# Patient Record
Sex: FEMALE | Race: WHITE | NOT HISPANIC OR LATINO | Employment: OTHER | URBAN - METROPOLITAN AREA
[De-identification: names, ages, dates, MRNs, and addresses within clinical notes are randomized per-mention and may not be internally consistent; named-entity substitution may affect disease eponyms.]

---

## 2017-01-06 ENCOUNTER — OFFICE VISIT (OUTPATIENT)
Dept: FAMILY MEDICINE | Facility: CLINIC | Age: 62
End: 2017-01-06
Payer: COMMERCIAL

## 2017-01-06 VITALS
SYSTOLIC BLOOD PRESSURE: 130 MMHG | WEIGHT: 153.56 LBS | HEIGHT: 64 IN | OXYGEN SATURATION: 95 % | DIASTOLIC BLOOD PRESSURE: 86 MMHG | BODY MASS INDEX: 26.21 KG/M2 | TEMPERATURE: 98 F | HEART RATE: 92 BPM

## 2017-01-06 DIAGNOSIS — J06.9 URI WITH COUGH AND CONGESTION: Primary | ICD-10-CM

## 2017-01-06 PROCEDURE — 99999 PR PBB SHADOW E&M-EST. PATIENT-LVL III: CPT | Mod: PBBFAC,,, | Performed by: NURSE PRACTITIONER

## 2017-01-06 PROCEDURE — 99213 OFFICE O/P EST LOW 20 MIN: CPT | Mod: S$GLB,,, | Performed by: NURSE PRACTITIONER

## 2017-01-06 PROCEDURE — 3075F SYST BP GE 130 - 139MM HG: CPT | Mod: S$GLB,,, | Performed by: NURSE PRACTITIONER

## 2017-01-06 PROCEDURE — 1159F MED LIST DOCD IN RCRD: CPT | Mod: S$GLB,,, | Performed by: NURSE PRACTITIONER

## 2017-01-06 PROCEDURE — 3079F DIAST BP 80-89 MM HG: CPT | Mod: S$GLB,,, | Performed by: NURSE PRACTITIONER

## 2017-01-06 RX ORDER — BENZONATATE 100 MG/1
100 CAPSULE ORAL 3 TIMES DAILY PRN
Qty: 30 CAPSULE | Refills: 0 | Status: SHIPPED | OUTPATIENT
Start: 2017-01-06 | End: 2017-01-16

## 2017-01-06 RX ORDER — FLUTICASONE PROPIONATE 50 MCG
2 SPRAY, SUSPENSION (ML) NASAL DAILY
Qty: 16 G | Refills: 11 | Status: SHIPPED | OUTPATIENT
Start: 2017-01-06 | End: 2017-12-29

## 2017-01-06 NOTE — PROGRESS NOTES
Subjective:       Patient ID: Tegan Alarcon is a 61 y.o. female.    Chief Complaint: Cough (x 2-3 days) and Nasal Congestion    HPI     Pt presents to clinic with complaints of a cough, nasal congestion for the past 2-3 days.   Cough is not productive. Denies fever, chills.   She has been taking otc Theraflu with some improvement in sx.   Denies hx of asthma, COPD. She is not a smoker.     Review of Systems   Constitutional: Negative for chills and fever.   HENT: Positive for congestion, postnasal drip, rhinorrhea, sinus pressure, sneezing and sore throat (improved). Negative for ear pain.    Respiratory: Positive for cough, shortness of breath (with persistent coughing. ) and wheezing.    Gastrointestinal: Negative for diarrhea, nausea and vomiting.   Musculoskeletal: Negative for neck pain and neck stiffness.   Skin: Negative for rash and wound.   Neurological: Negative for dizziness, light-headedness and headaches.       Objective:      Physical Exam   Constitutional: She is oriented to person, place, and time. She appears well-developed and well-nourished.   HENT:   Head: Normocephalic and atraumatic.   Right Ear: Tympanic membrane is not erythematous. A middle ear effusion is present.   Left Ear: Tympanic membrane is not erythematous. A middle ear effusion is present.   Nose: Mucosal edema and rhinorrhea present. Right sinus exhibits no maxillary sinus tenderness and no frontal sinus tenderness. Left sinus exhibits no maxillary sinus tenderness and no frontal sinus tenderness.   Mouth/Throat: Uvula is midline and mucous membranes are normal. No oropharyngeal exudate or posterior oropharyngeal erythema.   Eyes: Pupils are equal, round, and reactive to light. Right eye exhibits no discharge. Left eye exhibits no discharge.   Neck: Normal range of motion. Neck supple.   Cardiovascular: Normal rate, regular rhythm and normal heart sounds.    Pulmonary/Chest: Effort normal and breath sounds normal. No respiratory  distress. She has no wheezes. She has no rales.   Musculoskeletal: Normal range of motion. She exhibits no edema.   Neurological: She is alert and oriented to person, place, and time. No cranial nerve deficit. Coordination normal.   Skin: Skin is warm and dry. No rash noted. No erythema.   Psychiatric: She has a normal mood and affect. Her behavior is normal.   Nursing note and vitals reviewed.      Assessment:       1. URI with cough and congestion        Plan:   Tegan was seen today for cough and nasal congestion.    Diagnoses and all orders for this visit:    URI with cough and congestion  -     fluticasone (FLONASE) 50 mcg/actuation nasal spray; 2 sprays by Each Nare route once daily.  -     benzonatate (TESSALON) 100 MG capsule; Take 1 capsule (100 mg total) by mouth 3 (three) times daily as needed for Cough.  Expectant management reviewed: increase fluid intake, otc analgesics prn, mucinex and antihistamines for sx relief. Call if sx persist or worsen.

## 2017-02-20 RX ORDER — LISINOPRIL 10 MG/1
TABLET ORAL
Qty: 90 TABLET | Refills: 0 | Status: SHIPPED | OUTPATIENT
Start: 2017-02-20 | End: 2017-05-24 | Stop reason: SDUPTHER

## 2017-05-22 DIAGNOSIS — Z80.3 FAMILY HISTORY OF MALIGNANT NEOPLASM OF BREAST: Primary | ICD-10-CM

## 2017-05-22 DIAGNOSIS — R92.8 ABNORMAL MAMMOGRAM, UNSPECIFIED: ICD-10-CM

## 2017-05-24 RX ORDER — LISINOPRIL 10 MG/1
TABLET ORAL
Qty: 90 TABLET | Refills: 0 | Status: SHIPPED | OUTPATIENT
Start: 2017-05-24 | End: 2017-08-19 | Stop reason: SDUPTHER

## 2017-08-21 RX ORDER — LISINOPRIL 10 MG/1
TABLET ORAL
Qty: 90 TABLET | Refills: 0 | Status: SHIPPED | OUTPATIENT
Start: 2017-08-21 | End: 2017-11-18 | Stop reason: SDUPTHER

## 2017-11-20 RX ORDER — LISINOPRIL 10 MG/1
TABLET ORAL
Qty: 30 TABLET | Refills: 0 | Status: SHIPPED | OUTPATIENT
Start: 2017-11-20 | End: 2017-12-17 | Stop reason: SDUPTHER

## 2017-11-20 NOTE — TELEPHONE ENCOUNTER
----- Message from Esther Syed sent at 11/20/2017  2:54 PM CST -----  Returning your call.  Please call patient at [phone].

## 2017-12-06 ENCOUNTER — LAB VISIT (OUTPATIENT)
Dept: LAB | Facility: HOSPITAL | Age: 62
End: 2017-12-06
Attending: FAMILY MEDICINE
Payer: COMMERCIAL

## 2017-12-06 DIAGNOSIS — R74.01 TRANSAMINITIS: ICD-10-CM

## 2017-12-06 DIAGNOSIS — E78.5 HYPERLIPIDEMIA, UNSPECIFIED HYPERLIPIDEMIA TYPE: ICD-10-CM

## 2017-12-06 DIAGNOSIS — E87.0 SERUM SODIUM ELEVATED: ICD-10-CM

## 2017-12-06 LAB
ALBUMIN SERPL BCP-MCNC: 3.9 G/DL
ALP SERPL-CCNC: 102 U/L
ALT SERPL W/O P-5'-P-CCNC: 87 U/L
ANION GAP SERPL CALC-SCNC: 9 MMOL/L
AST SERPL-CCNC: 39 U/L
BILIRUB SERPL-MCNC: 0.4 MG/DL
BUN SERPL-MCNC: 20 MG/DL
CALCIUM SERPL-MCNC: 9.6 MG/DL
CHLORIDE SERPL-SCNC: 109 MMOL/L
CHOLEST SERPL-MCNC: 246 MG/DL
CHOLEST/HDLC SERPL: 3.4 {RATIO}
CO2 SERPL-SCNC: 25 MMOL/L
CREAT SERPL-MCNC: 0.8 MG/DL
EST. GFR  (AFRICAN AMERICAN): >60 ML/MIN/1.73 M^2
EST. GFR  (NON AFRICAN AMERICAN): >60 ML/MIN/1.73 M^2
GLUCOSE SERPL-MCNC: 92 MG/DL
HDLC SERPL-MCNC: 73 MG/DL
HDLC SERPL: 29.7 %
LDLC SERPL CALC-MCNC: 140.8 MG/DL
NONHDLC SERPL-MCNC: 173 MG/DL
POTASSIUM SERPL-SCNC: 4.2 MMOL/L
PROT SERPL-MCNC: 7.6 G/DL
SODIUM SERPL-SCNC: 143 MMOL/L
TRIGL SERPL-MCNC: 161 MG/DL

## 2017-12-06 PROCEDURE — 80061 LIPID PANEL: CPT

## 2017-12-06 PROCEDURE — 80053 COMPREHEN METABOLIC PANEL: CPT

## 2017-12-06 PROCEDURE — 36415 COLL VENOUS BLD VENIPUNCTURE: CPT | Mod: PN

## 2017-12-14 ENCOUNTER — PATIENT OUTREACH (OUTPATIENT)
Dept: ADMINISTRATIVE | Facility: HOSPITAL | Age: 62
End: 2017-12-14

## 2017-12-14 NOTE — LETTER
December 20, 2017    Tegan COLE Dorian  [address] [place] [address]             [facility]  [address]  [facility] [address]  Phone: [phone] Dear Mrs. Alarcon:    We have tried to reach you by mychart unsuccessfully.    [facility] is committed to your overall health.  To help you get the most out of each of your visits, we will review your information to make sure you are up to date on all of your recommended tests and/or procedures.       Dr. Kari Miguel has found that your chart shows you may be due for tetanus and shingles immunizations.     If you have had any of the above done at another facility, please bring the records or information with you so that your record at [facility] will be complete.  If you would like to schedule any of these, please contact me.     If you are currently taking medication, please bring it with you to your appointment for review.      If you have any questions or concerns, please don't hesitate to call.    Thank you for letting us care for you,  Francisca Lopez LPN Clinical Care Coordinator  [facility] [place]  [phone]

## 2017-12-18 RX ORDER — LISINOPRIL 10 MG/1
TABLET ORAL
Qty: 30 TABLET | Refills: 0 | Status: SHIPPED | OUTPATIENT
Start: 2017-12-18 | End: 2018-01-13 | Stop reason: SDUPTHER

## 2017-12-29 ENCOUNTER — OFFICE VISIT (OUTPATIENT)
Dept: FAMILY MEDICINE | Facility: CLINIC | Age: 62
End: 2017-12-29
Payer: COMMERCIAL

## 2017-12-29 VITALS
OXYGEN SATURATION: 99 % | TEMPERATURE: 98 F | WEIGHT: 154.88 LBS | HEART RATE: 70 BPM | DIASTOLIC BLOOD PRESSURE: 78 MMHG | HEIGHT: 64 IN | SYSTOLIC BLOOD PRESSURE: 118 MMHG | BODY MASS INDEX: 26.44 KG/M2 | RESPIRATION RATE: 16 BRPM

## 2017-12-29 DIAGNOSIS — M85.80 OSTEOPENIA, UNSPECIFIED LOCATION: ICD-10-CM

## 2017-12-29 DIAGNOSIS — Z78.0 POSTMENOPAUSAL STATE: ICD-10-CM

## 2017-12-29 DIAGNOSIS — I10 ESSENTIAL HYPERTENSION: ICD-10-CM

## 2017-12-29 DIAGNOSIS — Z00.00 ROUTINE GENERAL MEDICAL EXAMINATION AT A HEALTH CARE FACILITY: Primary | ICD-10-CM

## 2017-12-29 DIAGNOSIS — Z23 IMMUNIZATION DUE: ICD-10-CM

## 2017-12-29 DIAGNOSIS — E78.5 HYPERLIPIDEMIA, UNSPECIFIED HYPERLIPIDEMIA TYPE: ICD-10-CM

## 2017-12-29 PROCEDURE — 99999 PR PBB SHADOW E&M-EST. PATIENT-LVL IV: CPT | Mod: PBBFAC,,, | Performed by: FAMILY MEDICINE

## 2017-12-29 PROCEDURE — 99396 PREV VISIT EST AGE 40-64: CPT | Mod: 25,S$GLB,, | Performed by: FAMILY MEDICINE

## 2017-12-29 PROCEDURE — 90715 TDAP VACCINE 7 YRS/> IM: CPT | Mod: S$GLB,,, | Performed by: FAMILY MEDICINE

## 2017-12-29 PROCEDURE — 90471 IMMUNIZATION ADMIN: CPT | Mod: S$GLB,,, | Performed by: FAMILY MEDICINE

## 2017-12-29 NOTE — PROGRESS NOTES
Subjective:       Patient ID: Tegan Alarcon is a 62 y.o. female.    Chief Complaint: Annual Exam    HPI   Patient here today for her annual exam.  No updates to medical hx.  The 10-year ASCVD risk score (Izzy LINDA Jr., et al., 2013) is: 3.7%.  Labs 12/2017 rev.    Review of Systems   Constitutional: Negative for activity change, fatigue, fever and unexpected weight change.   HENT: Negative for congestion, postnasal drip, rhinorrhea, sinus pressure, sneezing and sore throat.    Eyes: Positive for itching. Negative for discharge and redness.   Respiratory: Negative for apnea (+baby snoring, no reported apnea), cough and shortness of breath.    Cardiovascular: Negative for chest pain, palpitations and leg swelling.   Gastrointestinal: Negative for abdominal pain, blood in stool, constipation, diarrhea and nausea.        No gerd c/o.   Genitourinary: Negative for difficulty urinating, dysuria, hematuria, vaginal bleeding and vaginal discharge.   Musculoskeletal: Negative for arthralgias, back pain and joint swelling.   Skin: Negative for color change and rash.   Neurological: Negative for dizziness, light-headedness and headaches.   Psychiatric/Behavioral: Negative for dysphoric mood and sleep disturbance. The patient is not nervous/anxious.        Objective:      Physical Exam   Constitutional: She is oriented to person, place, and time. She appears well-developed and well-nourished. No distress.   HENT:   Head: Normocephalic and atraumatic.   Right Ear: External ear normal.   Left Ear: External ear normal.   Nose: Nose normal.   Mouth/Throat: Oropharynx is clear and moist. No oropharyngeal exudate.   Eyes: Conjunctivae and EOM are normal. Pupils are equal, round, and reactive to light.   Neck: Normal range of motion. Neck supple. No thyromegaly present.   Cardiovascular: Normal rate and regular rhythm.    Pulmonary/Chest: Effort normal and breath sounds normal. No respiratory distress. She has no wheezes.   Abdominal:  Soft. Bowel sounds are normal. She exhibits no distension and no mass. There is no tenderness. There is no rebound and no guarding.   Musculoskeletal: Normal range of motion. She exhibits no edema.   Lymphadenopathy:     She has no cervical adenopathy.   Neurological: She is alert and oriented to person, place, and time. She has normal reflexes. No cranial nerve deficit.   Skin: Skin is warm and dry.   Psychiatric: She has a normal mood and affect. Her behavior is normal.   Nursing note and vitals reviewed.        Routine general medical examination at a health care facility  Anticipatory guidance reviewed.  Essential hypertension  Controlled, cont regimen.  Hyperlipidemia, unspecified hyperlipidemia type  Doing well, ascvd <7%.  Osteopenia, unspecified location  Cont ca+d, weight-bearing exercise.  Immunization due  tdap today.  Postmenopausal state  -     DXA Bone Density Spine And Hip; Future; Expected date: 12/29/2017  Other orders  -     (In Office Administered) Tdap Vaccine

## 2018-01-03 ENCOUNTER — HOSPITAL ENCOUNTER (OUTPATIENT)
Dept: RADIOLOGY | Facility: HOSPITAL | Age: 63
Discharge: HOME | End: 2018-01-03
Attending: FAMILY MEDICINE
Payer: COMMERCIAL

## 2018-01-03 DIAGNOSIS — Z78.0 POSTMENOPAUSAL STATE: ICD-10-CM

## 2018-01-03 PROCEDURE — 77080 DXA BONE DENSITY AXIAL: CPT | Mod: 26,,, | Performed by: RADIOLOGY

## 2018-01-03 PROCEDURE — 77080 DXA BONE DENSITY AXIAL: CPT | Mod: TC,PO

## 2018-01-15 RX ORDER — LISINOPRIL 10 MG/1
TABLET ORAL
Qty: 90 TABLET | Refills: 1 | Status: SHIPPED | OUTPATIENT
Start: 2018-01-15 | End: 2018-07-19 | Stop reason: SDUPTHER

## 2018-07-19 RX ORDER — LISINOPRIL 10 MG/1
TABLET ORAL
Qty: 90 TABLET | Refills: 0 | Status: SHIPPED | OUTPATIENT
Start: 2018-07-19 | End: 2018-10-15 | Stop reason: SDUPTHER

## 2018-10-15 RX ORDER — LISINOPRIL 10 MG/1
TABLET ORAL
Qty: 90 TABLET | Refills: 1 | Status: SHIPPED | OUTPATIENT
Start: 2018-10-15 | End: 2019-04-20 | Stop reason: SDUPTHER

## 2019-01-18 ENCOUNTER — TELEPHONE (OUTPATIENT)
Dept: FAMILY MEDICINE | Facility: CLINIC | Age: 64
End: 2019-01-18

## 2019-01-18 DIAGNOSIS — Z00.00 ROUTINE GENERAL MEDICAL EXAMINATION AT A HEALTH CARE FACILITY: Primary | ICD-10-CM

## 2019-01-18 DIAGNOSIS — I10 ESSENTIAL HYPERTENSION: ICD-10-CM

## 2019-01-18 DIAGNOSIS — E78.5 HYPERLIPIDEMIA, UNSPECIFIED HYPERLIPIDEMIA TYPE: ICD-10-CM

## 2019-01-18 NOTE — TELEPHONE ENCOUNTER
Last labs on 12-6-17.    Placed orders per wog. Please review and advise if more labs are needed.

## 2019-01-18 NOTE — TELEPHONE ENCOUNTER
----- Message from Karina Garcia sent at 1/18/2019 11:46 AM CST -----  Contact: self  Patient need lab order put in for appointment on 02/20 per patient       Please call to advice [phone]

## 2019-02-13 ENCOUNTER — LAB VISIT (OUTPATIENT)
Dept: LAB | Facility: HOSPITAL | Age: 64
End: 2019-02-13
Attending: FAMILY MEDICINE
Payer: COMMERCIAL

## 2019-02-13 DIAGNOSIS — Z00.00 ROUTINE GENERAL MEDICAL EXAMINATION AT A HEALTH CARE FACILITY: ICD-10-CM

## 2019-02-13 DIAGNOSIS — I10 ESSENTIAL HYPERTENSION: ICD-10-CM

## 2019-02-13 DIAGNOSIS — E78.5 HYPERLIPIDEMIA, UNSPECIFIED HYPERLIPIDEMIA TYPE: ICD-10-CM

## 2019-02-13 LAB
ALBUMIN SERPL BCP-MCNC: 3.9 G/DL
ALP SERPL-CCNC: 78 U/L
ALT SERPL W/O P-5'-P-CCNC: 39 U/L
ANION GAP SERPL CALC-SCNC: 10 MMOL/L
AST SERPL-CCNC: 24 U/L
BASOPHILS # BLD AUTO: 0.04 K/UL
BASOPHILS NFR BLD: 0.7 %
BILIRUB SERPL-MCNC: 0.4 MG/DL
BUN SERPL-MCNC: 20 MG/DL
CALCIUM SERPL-MCNC: 9.7 MG/DL
CHLORIDE SERPL-SCNC: 107 MMOL/L
CHOLEST SERPL-MCNC: 218 MG/DL
CHOLEST/HDLC SERPL: 3.7 {RATIO}
CO2 SERPL-SCNC: 26 MMOL/L
CREAT SERPL-MCNC: 0.8 MG/DL
DIFFERENTIAL METHOD: NORMAL
EOSINOPHIL # BLD AUTO: 0.1 K/UL
EOSINOPHIL NFR BLD: 1.3 %
ERYTHROCYTE [DISTWIDTH] IN BLOOD BY AUTOMATED COUNT: 12.7 %
EST. GFR  (AFRICAN AMERICAN): >60 ML/MIN/1.73 M^2
EST. GFR  (NON AFRICAN AMERICAN): >60 ML/MIN/1.73 M^2
GLUCOSE SERPL-MCNC: 81 MG/DL
HCT VFR BLD AUTO: 42.3 %
HDLC SERPL-MCNC: 59 MG/DL
HDLC SERPL: 27.1 %
HGB BLD-MCNC: 14.1 G/DL
IMM GRANULOCYTES # BLD AUTO: 0.01 K/UL
IMM GRANULOCYTES NFR BLD AUTO: 0.2 %
LDLC SERPL CALC-MCNC: 125.8 MG/DL
LYMPHOCYTES # BLD AUTO: 2.3 K/UL
LYMPHOCYTES NFR BLD: 41.6 %
MCH RBC QN AUTO: 30.8 PG
MCHC RBC AUTO-ENTMCNC: 33.3 G/DL
MCV RBC AUTO: 92 FL
MONOCYTES # BLD AUTO: 0.7 K/UL
MONOCYTES NFR BLD: 11.9 %
NEUTROPHILS # BLD AUTO: 2.4 K/UL
NEUTROPHILS NFR BLD: 44.3 %
NONHDLC SERPL-MCNC: 159 MG/DL
NRBC BLD-RTO: 0 /100 WBC
PLATELET # BLD AUTO: 256 K/UL
PMV BLD AUTO: 11 FL
POTASSIUM SERPL-SCNC: 4.2 MMOL/L
PROT SERPL-MCNC: 7.1 G/DL
RBC # BLD AUTO: 4.58 M/UL
SODIUM SERPL-SCNC: 143 MMOL/L
TRIGL SERPL-MCNC: 166 MG/DL
TSH SERPL DL<=0.005 MIU/L-ACNC: 1.75 UIU/ML
WBC # BLD AUTO: 5.48 K/UL

## 2019-02-13 PROCEDURE — 36415 COLL VENOUS BLD VENIPUNCTURE: CPT | Mod: PN

## 2019-02-13 PROCEDURE — 84443 ASSAY THYROID STIM HORMONE: CPT

## 2019-02-13 PROCEDURE — 85025 COMPLETE CBC W/AUTO DIFF WBC: CPT

## 2019-02-13 PROCEDURE — 80053 COMPREHEN METABOLIC PANEL: CPT

## 2019-02-13 PROCEDURE — 80061 LIPID PANEL: CPT

## 2019-02-20 ENCOUNTER — OFFICE VISIT (OUTPATIENT)
Dept: FAMILY MEDICINE | Facility: CLINIC | Age: 64
End: 2019-02-20
Payer: COMMERCIAL

## 2019-02-20 VITALS
TEMPERATURE: 98 F | HEIGHT: 64 IN | DIASTOLIC BLOOD PRESSURE: 72 MMHG | OXYGEN SATURATION: 96 % | RESPIRATION RATE: 18 BRPM | HEART RATE: 72 BPM | SYSTOLIC BLOOD PRESSURE: 118 MMHG | WEIGHT: 153.25 LBS | BODY MASS INDEX: 26.16 KG/M2

## 2019-02-20 DIAGNOSIS — E78.5 HYPERLIPIDEMIA, UNSPECIFIED HYPERLIPIDEMIA TYPE: ICD-10-CM

## 2019-02-20 DIAGNOSIS — Z00.00 ROUTINE GENERAL MEDICAL EXAMINATION AT A HEALTH CARE FACILITY: Primary | ICD-10-CM

## 2019-02-20 PROCEDURE — 3074F SYST BP LT 130 MM HG: CPT | Mod: CPTII,S$GLB,, | Performed by: FAMILY MEDICINE

## 2019-02-20 PROCEDURE — 90471 IMMUNIZATION ADMIN: CPT | Mod: S$GLB,,, | Performed by: FAMILY MEDICINE

## 2019-02-20 PROCEDURE — 99396 PREV VISIT EST AGE 40-64: CPT | Mod: 25,S$GLB,, | Performed by: FAMILY MEDICINE

## 2019-02-20 PROCEDURE — 90471 FLU VACCINE (QUAD) GREATER THAN OR EQUAL TO 3YO PRESERVATIVE FREE IM: ICD-10-PCS | Mod: S$GLB,,, | Performed by: FAMILY MEDICINE

## 2019-02-20 PROCEDURE — 3078F DIAST BP <80 MM HG: CPT | Mod: CPTII,S$GLB,, | Performed by: FAMILY MEDICINE

## 2019-02-20 PROCEDURE — 90686 IIV4 VACC NO PRSV 0.5 ML IM: CPT | Mod: S$GLB,,, | Performed by: FAMILY MEDICINE

## 2019-02-20 PROCEDURE — 3078F PR MOST RECENT DIASTOLIC BLOOD PRESSURE < 80 MM HG: ICD-10-PCS | Mod: CPTII,S$GLB,, | Performed by: FAMILY MEDICINE

## 2019-02-20 PROCEDURE — 99396 PR PREVENTIVE VISIT,EST,40-64: ICD-10-PCS | Mod: 25,S$GLB,, | Performed by: FAMILY MEDICINE

## 2019-02-20 PROCEDURE — 3074F PR MOST RECENT SYSTOLIC BLOOD PRESSURE < 130 MM HG: ICD-10-PCS | Mod: CPTII,S$GLB,, | Performed by: FAMILY MEDICINE

## 2019-02-20 PROCEDURE — 99999 PR PBB SHADOW E&M-EST. PATIENT-LVL III: CPT | Mod: PBBFAC,,, | Performed by: FAMILY MEDICINE

## 2019-02-20 PROCEDURE — 99999 PR PBB SHADOW E&M-EST. PATIENT-LVL III: ICD-10-PCS | Mod: PBBFAC,,, | Performed by: FAMILY MEDICINE

## 2019-02-20 PROCEDURE — 90686 FLU VACCINE (QUAD) GREATER THAN OR EQUAL TO 3YO PRESERVATIVE FREE IM: ICD-10-PCS | Mod: S$GLB,,, | Performed by: FAMILY MEDICINE

## 2019-02-20 RX ORDER — ESTRADIOL 0.1 MG/G
1 CREAM VAGINAL
Refills: 6 | COMMUNITY
Start: 2019-01-24 | End: 2021-03-18

## 2019-02-20 NOTE — PROGRESS NOTES
Subjective:       Patient ID: Tegan Alarcon is a 63 y.o. female.    Chief Complaint: Annual Exam      Tegan Alarcon is in the office for annual exam.    HPI  Medical hx reviewed.  Past Medical History:   Diagnosis Date    Bone disorder     Hypertension     diagnosed 6/2014         Current Outpatient Medications:     BEPREVE 1.5 % Drop, INSTILL 1 DROP IN BOTH EYES BID, Disp: , Rfl: 2    CALCIUM CARBONATE/VITAMIN D3 (CALCIUM 500 + D, D3, ORAL), Take by mouth 2 (two) times daily.  , Disp: , Rfl:     ESTRACE 0.01 % (0.1 mg/gram) vaginal cream, Place 1 g vaginally twice a week. , Disp: , Rfl: 6    lisinopril 10 MG tablet, TAKE 1 TABLET BY MOUTH EVERY DAY, Disp: 90 tablet, Rfl: 1    The 10-year ASCVD risk score (Izzy MCEKON Jr., et al., 2013) is: 4.3%    Values used to calculate the score:      Age: 63 years      Sex: Female      Is Non- : No      Diabetic: No      Tobacco smoker: No      Systolic Blood Pressure: 118 mmHg      Is BP treated: Yes      HDL Cholesterol: 59 mg/dL      Total Cholesterol: 218 mg/dL     Lab Results   Component Value Date    HGBA1C 5.1 10/18/2016     Lab Results   Component Value Date    LDLCALC 125.8 02/13/2019    CREATININE 0.8 02/13/2019   labs 2019 rev.    Review of Systems   Constitutional: Negative for activity change, fatigue and unexpected weight change.   HENT: Negative for congestion, hearing loss, postnasal drip, rhinorrhea, sneezing, sore throat and trouble swallowing.    Eyes: Negative for discharge, redness and itching (using eye drops prn).   Respiratory: Negative for apnea (+baby snoring, no reported apnea), cough and shortness of breath.    Cardiovascular: Negative for chest pain, palpitations and leg swelling (with humidity).   Gastrointestinal: Negative for abdominal pain, blood in stool, constipation, diarrhea and nausea.        No gerd c/o.   Genitourinary: Negative for difficulty urinating, dysuria, frequency (nighttime 0-1) and hematuria.    Musculoskeletal: Positive for myalgias (deltoids sore since restarting exercise, ellipitical/weights). Negative for arthralgias, back pain and joint swelling.        Exercise: ellipitical, weights.  No falls since LOV.   Skin: Negative for color change and rash.   Neurological: Negative for dizziness, light-headedness and headaches.   Psychiatric/Behavioral: Negative for dysphoric mood and sleep disturbance. The patient is not nervous/anxious.        Objective:      Physical Exam   Constitutional: She is oriented to person, place, and time. She appears well-developed and well-nourished. No distress.   HENT:   Head: Normocephalic and atraumatic.   Right Ear: External ear normal.   Left Ear: External ear normal.   Nose: Nose normal.   Mouth/Throat: Oropharynx is clear and moist. No oropharyngeal exudate.   Eyes: Conjunctivae and EOM are normal. Pupils are equal, round, and reactive to light.   Neck: Normal range of motion. Neck supple. No thyromegaly present.   Cardiovascular: Normal rate and regular rhythm.   Pulmonary/Chest: Effort normal and breath sounds normal. No respiratory distress. She has no wheezes.   Abdominal: Soft. Bowel sounds are normal. She exhibits no distension and no mass. There is no tenderness. There is no rebound and no guarding.   Musculoskeletal: Normal range of motion. She exhibits no edema.   Lymphadenopathy:     She has no cervical adenopathy.   Neurological: She is alert and oriented to person, place, and time.   Skin: Skin is warm and dry.   Psychiatric: She has a normal mood and affect. Her behavior is normal.   Nursing note and vitals reviewed.      Screening recommendations appropriate to age and health status were reviewed.    Assessment & Plan:    Routine general medical examination at a health care facility    Anticipatory guidance reviewed.  Continue exercise, incl weight-bearing.   Discussed BMJ study re: ACEi. Will change med if nec.

## 2019-04-22 RX ORDER — LISINOPRIL 10 MG/1
TABLET ORAL
Qty: 90 TABLET | Refills: 0 | Status: SHIPPED | OUTPATIENT
Start: 2019-04-22 | End: 2019-07-22 | Stop reason: SDUPTHER

## 2019-07-22 RX ORDER — LISINOPRIL 10 MG/1
10 TABLET ORAL DAILY
Qty: 90 TABLET | Refills: 1 | Status: SHIPPED | OUTPATIENT
Start: 2019-07-22 | End: 2020-01-27

## 2019-07-22 NOTE — TELEPHONE ENCOUNTER
Pt Of Kari Miguel MD    Last seen on: 02/20/2019    Next appt: n/a    Last refill: 04/22/2019    Allergies: Review of patient's allergies indicates:  No Known Allergies    Pharmacy:   [facility] [address] [place] [address] [address] [place] [address]  [address] [facility] [address]  Phone: [phone] Fax: [phone]      Labs: Please review.      Please review! Thank you!

## 2020-01-27 RX ORDER — LISINOPRIL 10 MG/1
TABLET ORAL
Qty: 90 TABLET | Refills: 1 | Status: SHIPPED | OUTPATIENT
Start: 2020-01-27 | End: 2020-07-24

## 2020-04-03 DIAGNOSIS — Z12.39 BREAST CANCER SCREENING: ICD-10-CM

## 2020-05-06 ENCOUNTER — PATIENT MESSAGE (OUTPATIENT)
Dept: ADMINISTRATIVE | Facility: HOSPITAL | Age: 65
End: 2020-05-06

## 2020-07-24 ENCOUNTER — TELEPHONE (OUTPATIENT)
Dept: FAMILY MEDICINE | Facility: CLINIC | Age: 65
End: 2020-07-24

## 2020-07-24 DIAGNOSIS — Z00.00 ROUTINE GENERAL MEDICAL EXAMINATION AT A HEALTH CARE FACILITY: ICD-10-CM

## 2020-07-24 DIAGNOSIS — E78.5 HYPERLIPIDEMIA, UNSPECIFIED HYPERLIPIDEMIA TYPE: Primary | ICD-10-CM

## 2020-07-24 RX ORDER — LISINOPRIL 10 MG/1
TABLET ORAL
Qty: 90 TABLET | Refills: 0 | Status: SHIPPED | OUTPATIENT
Start: 2020-07-24 | End: 2020-10-26

## 2020-07-24 NOTE — TELEPHONE ENCOUNTER
----- Message from Paulina Coyle sent at 7/24/2020  4:51 PM CDT -----  Contact: self  Patient is scheduled for her annual on 8/10 and needs her labs put in the system, call patient back so she can get them scheduled [phone]. Thank you!

## 2020-07-27 ENCOUNTER — PATIENT OUTREACH (OUTPATIENT)
Dept: ADMINISTRATIVE | Facility: HOSPITAL | Age: 65
End: 2020-07-27

## 2020-07-27 NOTE — PROGRESS NOTES
Chart review completed 2020.  Care Everywhere updates requested and reviewed.  Immunizations reconciled. Media reports reviewed.  Duplicate HM overrides and  orders removed.  Overdue HM topic chart audit and/or requested.  Overdue lab testing linked to upcoming lab appointments if applies.    Lab ruthie, and quest reviewed.    DIS reviewed for mammogram.  HM updated with external mammogram report.       Health Maintenance Due   Topic Date Due    HIV Screening  1972    Shingles Vaccine (1 of 2) 2007    Colorectal Cancer Screening  2019    Lipid Panel  2020

## 2020-08-03 ENCOUNTER — LAB VISIT (OUTPATIENT)
Dept: LAB | Facility: HOSPITAL | Age: 65
End: 2020-08-03
Attending: FAMILY MEDICINE
Payer: COMMERCIAL

## 2020-08-03 DIAGNOSIS — Z00.00 ROUTINE GENERAL MEDICAL EXAMINATION AT A HEALTH CARE FACILITY: ICD-10-CM

## 2020-08-03 DIAGNOSIS — E78.5 HYPERLIPIDEMIA, UNSPECIFIED HYPERLIPIDEMIA TYPE: ICD-10-CM

## 2020-08-03 PROCEDURE — 80053 COMPREHEN METABOLIC PANEL: CPT

## 2020-08-03 PROCEDURE — 36415 COLL VENOUS BLD VENIPUNCTURE: CPT | Mod: PN

## 2020-08-03 PROCEDURE — 82306 VITAMIN D 25 HYDROXY: CPT

## 2020-08-03 PROCEDURE — 80061 LIPID PANEL: CPT

## 2020-08-04 LAB
25(OH)D3+25(OH)D2 SERPL-MCNC: 76 NG/ML (ref 30–96)
ALBUMIN SERPL BCP-MCNC: 4.3 G/DL (ref 3.5–5.2)
ALP SERPL-CCNC: 76 U/L (ref 55–135)
ALT SERPL W/O P-5'-P-CCNC: 41 U/L (ref 10–44)
ANION GAP SERPL CALC-SCNC: 11 MMOL/L (ref 8–16)
AST SERPL-CCNC: 30 U/L (ref 10–40)
BILIRUB SERPL-MCNC: 0.5 MG/DL (ref 0.1–1)
BUN SERPL-MCNC: 17 MG/DL (ref 8–23)
CALCIUM SERPL-MCNC: 9.5 MG/DL (ref 8.7–10.5)
CHLORIDE SERPL-SCNC: 107 MMOL/L (ref 95–110)
CHOLEST SERPL-MCNC: 234 MG/DL (ref 120–199)
CHOLEST/HDLC SERPL: 4 {RATIO} (ref 2–5)
CO2 SERPL-SCNC: 24 MMOL/L (ref 23–29)
CREAT SERPL-MCNC: 1 MG/DL (ref 0.5–1.4)
EST. GFR  (AFRICAN AMERICAN): >60 ML/MIN/1.73 M^2
EST. GFR  (NON AFRICAN AMERICAN): >60 ML/MIN/1.73 M^2
GLUCOSE SERPL-MCNC: 84 MG/DL (ref 70–110)
HDLC SERPL-MCNC: 58 MG/DL (ref 40–75)
HDLC SERPL: 24.8 % (ref 20–50)
LDLC SERPL CALC-MCNC: 131.6 MG/DL (ref 63–159)
NONHDLC SERPL-MCNC: 176 MG/DL
POTASSIUM SERPL-SCNC: 4.3 MMOL/L (ref 3.5–5.1)
PROT SERPL-MCNC: 7.6 G/DL (ref 6–8.4)
SODIUM SERPL-SCNC: 142 MMOL/L (ref 136–145)
TRIGL SERPL-MCNC: 222 MG/DL (ref 30–150)

## 2020-08-10 ENCOUNTER — OFFICE VISIT (OUTPATIENT)
Dept: FAMILY MEDICINE | Facility: CLINIC | Age: 65
End: 2020-08-10
Payer: COMMERCIAL

## 2020-08-10 VITALS
HEART RATE: 68 BPM | WEIGHT: 152.25 LBS | DIASTOLIC BLOOD PRESSURE: 74 MMHG | SYSTOLIC BLOOD PRESSURE: 134 MMHG | TEMPERATURE: 98 F | HEIGHT: 64 IN | BODY MASS INDEX: 25.99 KG/M2

## 2020-08-10 DIAGNOSIS — E78.5 HYPERLIPIDEMIA, UNSPECIFIED HYPERLIPIDEMIA TYPE: ICD-10-CM

## 2020-08-10 DIAGNOSIS — Z00.00 ROUTINE GENERAL MEDICAL EXAMINATION AT A HEALTH CARE FACILITY: Primary | ICD-10-CM

## 2020-08-10 DIAGNOSIS — Z12.11 SPECIAL SCREENING FOR MALIGNANT NEOPLASMS, COLON: ICD-10-CM

## 2020-08-10 PROCEDURE — 99396 PR PREVENTIVE VISIT,EST,40-64: ICD-10-PCS | Mod: S$GLB,,, | Performed by: FAMILY MEDICINE

## 2020-08-10 PROCEDURE — 99396 PREV VISIT EST AGE 40-64: CPT | Mod: S$GLB,,, | Performed by: FAMILY MEDICINE

## 2020-08-10 PROCEDURE — 99999 PR PBB SHADOW E&M-EST. PATIENT-LVL IV: ICD-10-PCS | Mod: PBBFAC,,, | Performed by: FAMILY MEDICINE

## 2020-08-10 PROCEDURE — 3078F PR MOST RECENT DIASTOLIC BLOOD PRESSURE < 80 MM HG: ICD-10-PCS | Mod: CPTII,S$GLB,, | Performed by: FAMILY MEDICINE

## 2020-08-10 PROCEDURE — 3008F BODY MASS INDEX DOCD: CPT | Mod: CPTII,S$GLB,, | Performed by: FAMILY MEDICINE

## 2020-08-10 PROCEDURE — 3008F PR BODY MASS INDEX (BMI) DOCUMENTED: ICD-10-PCS | Mod: CPTII,S$GLB,, | Performed by: FAMILY MEDICINE

## 2020-08-10 PROCEDURE — 99999 PR PBB SHADOW E&M-EST. PATIENT-LVL IV: CPT | Mod: PBBFAC,,, | Performed by: FAMILY MEDICINE

## 2020-08-10 PROCEDURE — 3075F PR MOST RECENT SYSTOLIC BLOOD PRESS GE 130-139MM HG: ICD-10-PCS | Mod: CPTII,S$GLB,, | Performed by: FAMILY MEDICINE

## 2020-08-10 PROCEDURE — 3078F DIAST BP <80 MM HG: CPT | Mod: CPTII,S$GLB,, | Performed by: FAMILY MEDICINE

## 2020-08-10 PROCEDURE — 3075F SYST BP GE 130 - 139MM HG: CPT | Mod: CPTII,S$GLB,, | Performed by: FAMILY MEDICINE

## 2020-08-10 NOTE — PROGRESS NOTES
Subjective:       Patient ID: Tegan Alarcon is a 65 y.o. female.    Chief Complaint: Annual Exam (soreness to RUE )      Tegan Alarcon is in the office for annual exam.    HPI  No updates to medical hx.  Past Medical History:   Diagnosis Date    Bone disorder     Hypertension     diagnosed 6/2014     Had screening eval with gallstones, asymp.    Current Outpatient Medications:     CALCIUM CARBONATE/VITAMIN D3 (CALCIUM 500 + D, D3, ORAL), Take by mouth 2 (two) times daily.  , Disp: , Rfl:     ESTRACE 0.01 % (0.1 mg/gram) vaginal cream, Place 1 g vaginally twice a week. , Disp: , Rfl: 6    lisinopriL 10 MG tablet, TAKE 1 TABLET(10 MG) BY MOUTH EVERY DAY, Disp: 90 tablet, Rfl: 0    BEPREVE 1.5 % Drop, INSTILL 1 DROP IN BOTH EYES BID, Disp: , Rfl: 2    The 10-year ASCVD risk score (Izzy MCKEON Jr., et al., 2013) is: 7.1%    Values used to calculate the score:      Age: 65 years      Sex: Female      Is Non- : No      Diabetic: No      Tobacco smoker: No      Systolic Blood Pressure: 134 mmHg      Is BP treated: Yes      HDL Cholesterol: 58 mg/dL      Total Cholesterol: 234 mg/dL   Reviewed lipid panel, recommended lowering.     Lab Results   Component Value Date    HGBA1C 5.1 10/18/2016     Lab Results   Component Value Date    LDLCALC 131.6 08/03/2020    CREATININE 1.0 08/03/2020   labs 2020 rev. Discussed lipid recheck in 6mos.     Review of Systems   Constitutional: Negative for activity change, fatigue and unexpected weight change.   HENT: Negative for congestion, hearing loss, postnasal drip, rhinorrhea, sneezing and trouble swallowing.    Eyes: Negative for redness and itching.   Respiratory: Negative for apnea (+baby snoring, no reported apnea), cough (slight, related to pnd) and shortness of breath.    Cardiovascular: Negative for chest pain, palpitations and leg swelling.   Gastrointestinal: Positive for constipation (irregular BMs). Negative for abdominal pain, blood in stool,  diarrhea and nausea.        No gerd c/o.   Genitourinary: Negative for difficulty urinating, dysuria and frequency (nighttime 0-1).   Musculoskeletal: Positive for myalgias (R shoulder, R hip). Negative for arthralgias, back pain and joint swelling.        Not exercising as regularly due to covid.  R shoulder with ache noted on some rotations. ROM not severely limited, but its noticeable, several weeks.  R hip with ache from sit to stand.   No falls since LOV.   Skin: Negative for color change and rash.   Neurological: Negative for dizziness, light-headedness and headaches.   Psychiatric/Behavioral: Negative for dysphoric mood and sleep disturbance. The patient is not nervous/anxious.            Objective:      Physical Exam  Vitals signs and nursing note reviewed.   Constitutional:       General: She is not in acute distress.     Appearance: She is well-developed.   HENT:      Head: Normocephalic and atraumatic.      Right Ear: Tympanic membrane and external ear normal.      Left Ear: Tympanic membrane and external ear normal.      Nose: Nose normal.      Mouth/Throat:      Pharynx: No oropharyngeal exudate.   Eyes:      Conjunctiva/sclera: Conjunctivae normal.      Pupils: Pupils are equal, round, and reactive to light.   Neck:      Musculoskeletal: Normal range of motion and neck supple.      Thyroid: No thyromegaly.   Cardiovascular:      Rate and Rhythm: Normal rate and regular rhythm.   Pulmonary:      Effort: Pulmonary effort is normal. No respiratory distress.      Breath sounds: Normal breath sounds. No wheezing.   Abdominal:      General: Bowel sounds are normal. There is no distension.      Palpations: Abdomen is soft. There is no mass.      Tenderness: There is no abdominal tenderness. There is no guarding or rebound.   Musculoskeletal: Normal range of motion.         General: No swelling.      Right shoulder: She exhibits pain (along head of bicep, cac; particularly noticed with rotation). She exhibits  normal range of motion, no crepitus and normal strength.      Right lower leg: No edema.      Left lower leg: No edema.      Comments: R hip pain reproduced on IT band stretch. Reviewed regular stretching for sx relief.   Lymphadenopathy:      Cervical: No cervical adenopathy.   Skin:     General: Skin is warm and dry.   Neurological:      General: No focal deficit present.      Mental Status: She is alert and oriented to person, place, and time.      Cranial Nerves: No cranial nerve deficit.   Psychiatric:         Mood and Affect: Mood normal.         Behavior: Behavior normal.             Screening recommendations appropriate to age and health status were reviewed.    Assessment & Plan:    Routine general medical examination at a health care facility  Comments:  anticipatory guidance reviewed  reviewed stretching for hip, R shoulder  occ cough noted, to let me know if persistent for change from acei    Special screening for malignant neoplasms, colon  -     Fecal Immunochemical Test (iFOBT); Future; Expected date: 08/10/2020  Options in screening for colon cancer were briefly reviewed to include hemoccult cards annually, cologuard every 3 years, and colonoscopy. Pros and cons of each procedure were briefly reviewed as well.      Hyperlipidemia, unspecified hyperlipidemia type  Comments:  discussed dietary changes to improve lipid panel, recheck 6mos   Orders:  -     Lipid Panel; Future; Expected date: 08/10/2020  -     CBC Without Differential; Future; Expected date: 08/10/2020  -     TSH; Future; Expected date: 08/10/2020  -     Comprehensive metabolic panel; Future; Expected date: 08/10/2020

## 2020-08-10 NOTE — PATIENT INSTRUCTIONS
For constipation:  1. Water  2. Miralax: 1/2-1 capful EVERY DAY with goal of soft, formed stool daily.   3. Stool softeners - 1-3 tablets of docusate as needed.   Increase fruits and vegetables  4. Walking  5. Fiber      For sleep:  Benadryl 1-2 tablets at bedtime.  Melatonin 5-10mg at bedtime.  Magnesium 200-400mg at bedtime.       Let me know if your cough continues. Try flonase at bedtime in the interim.

## 2020-08-31 ENCOUNTER — LAB VISIT (OUTPATIENT)
Dept: LAB | Facility: HOSPITAL | Age: 65
End: 2020-08-31
Attending: FAMILY MEDICINE
Payer: COMMERCIAL

## 2020-08-31 DIAGNOSIS — Z12.11 SPECIAL SCREENING FOR MALIGNANT NEOPLASMS, COLON: ICD-10-CM

## 2020-08-31 PROCEDURE — 82274 ASSAY TEST FOR BLOOD FECAL: CPT

## 2020-09-09 LAB — HEMOCCULT STL QL IA: POSITIVE

## 2020-09-10 ENCOUNTER — TELEPHONE (OUTPATIENT)
Dept: FAMILY MEDICINE | Facility: CLINIC | Age: 65
End: 2020-09-10

## 2020-09-10 DIAGNOSIS — R19.5 POSITIVE FIT (FECAL IMMUNOCHEMICAL TEST): Primary | ICD-10-CM

## 2020-09-10 DIAGNOSIS — Z12.11 SPECIAL SCREENING FOR MALIGNANT NEOPLASMS, COLON: ICD-10-CM

## 2020-09-23 ENCOUNTER — TELEPHONE (OUTPATIENT)
Dept: GASTROENTEROLOGY | Facility: CLINIC | Age: 65
End: 2020-09-23

## 2020-09-23 ENCOUNTER — TELEPHONE (OUTPATIENT)
Dept: FAMILY MEDICINE | Facility: CLINIC | Age: 65
End: 2020-09-23

## 2020-09-23 DIAGNOSIS — Z01.818 PREOP EXAMINATION: ICD-10-CM

## 2020-09-23 NOTE — TELEPHONE ENCOUNTER
Spoke w/ pt. She had pos Fitkit and order was placed for cscope on 9/10/20. Pt has not rec'd call to sched cscope and is concerned. Please contact pt to schedule. Any provider OK. Thank you

## 2020-09-23 NOTE — TELEPHONE ENCOUNTER
----- Message from Andrea Yoon sent at 9/23/2020 11:27 AM CDT -----  Type: Needs Medical Advice    Who Called:  Patient  Best Call Back Number: [phone]  Additional Information: Patient would like to discuss recent colonoscopy. Please call to advise. Thanks!

## 2020-09-30 ENCOUNTER — TELEPHONE (OUTPATIENT)
Dept: GASTROENTEROLOGY | Facility: CLINIC | Age: 65
End: 2020-09-30

## 2020-09-30 NOTE — TELEPHONE ENCOUNTER
----- Message from Anaya Farris sent at 9/30/2020  2:06 PM CDT -----  Patient is calling because she received a call about rescheduling the colonoscopy.  She said she would like to have it done on 10/9, she thinks it would be Dr Conley.  Please call her at [phone].  Thank you!

## 2020-10-06 ENCOUNTER — LAB VISIT (OUTPATIENT)
Dept: FAMILY MEDICINE | Facility: CLINIC | Age: 65
End: 2020-10-06
Payer: COMMERCIAL

## 2020-10-06 DIAGNOSIS — Z01.818 PREOP EXAMINATION: ICD-10-CM

## 2020-10-06 PROCEDURE — U0003 INFECTIOUS AGENT DETECTION BY NUCLEIC ACID (DNA OR RNA); SEVERE ACUTE RESPIRATORY SYNDROME CORONAVIRUS 2 (SARS-COV-2) (CORONAVIRUS DISEASE [COVID-19]), AMPLIFIED PROBE TECHNIQUE, MAKING USE OF HIGH THROUGHPUT TECHNOLOGIES AS DESCRIBED BY CMS-2020-01-R: HCPCS

## 2020-10-07 ENCOUNTER — TELEPHONE (OUTPATIENT)
Dept: GASTROENTEROLOGY | Facility: CLINIC | Age: 65
End: 2020-10-07

## 2020-10-07 LAB — SARS-COV-2 RNA RESP QL NAA+PROBE: NOT DETECTED
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
## 2020-10-08 ENCOUNTER — ANESTHESIA EVENT (OUTPATIENT)
Dept: ENDOSCOPY | Facility: HOSPITAL | Age: 65
End: 2020-10-08
Payer: COMMERCIAL

## 2020-10-09 ENCOUNTER — ANESTHESIA (OUTPATIENT)
Dept: ENDOSCOPY | Facility: HOSPITAL | Age: 65
End: 2020-10-09
Payer: COMMERCIAL

## 2020-10-09 ENCOUNTER — HOSPITAL ENCOUNTER (OUTPATIENT)
Facility: HOSPITAL | Age: 65
Discharge: HOME | End: 2020-10-09
Attending: INTERNAL MEDICINE | Admitting: INTERNAL MEDICINE
Payer: COMMERCIAL

## 2020-10-09 DIAGNOSIS — R19.5 POSITIVE FIT (FECAL IMMUNOCHEMICAL TEST): Primary | ICD-10-CM

## 2020-10-09 PROCEDURE — 63600175 PHARM REV CODE 636 W HCPCS: Mod: PO | Performed by: NURSE ANESTHETIST, CERTIFIED REGISTERED

## 2020-10-09 PROCEDURE — 37000008 HC ANESTHESIA 1ST 15 MINUTES: Mod: PO | Performed by: INTERNAL MEDICINE

## 2020-10-09 PROCEDURE — 27201089 HC SNARE, DISP (ANY): Mod: PO | Performed by: INTERNAL MEDICINE

## 2020-10-09 PROCEDURE — D9220A PRA ANESTHESIA: ICD-10-PCS | Mod: ANES,,, | Performed by: ANESTHESIOLOGY

## 2020-10-09 PROCEDURE — D9220A PRA ANESTHESIA: ICD-10-PCS | Mod: CRNA,,, | Performed by: NURSE ANESTHETIST, CERTIFIED REGISTERED

## 2020-10-09 PROCEDURE — D9220A PRA ANESTHESIA: Mod: CRNA,,, | Performed by: NURSE ANESTHETIST, CERTIFIED REGISTERED

## 2020-10-09 PROCEDURE — 88305 TISSUE EXAM BY PATHOLOGIST: CPT | Mod: 26,,, | Performed by: PATHOLOGY

## 2020-10-09 PROCEDURE — 88305 TISSUE EXAM BY PATHOLOGIST: CPT | Performed by: PATHOLOGY

## 2020-10-09 PROCEDURE — 45385 PR COLONOSCOPY,REMV LESN,SNARE: ICD-10-PCS | Mod: ,,, | Performed by: INTERNAL MEDICINE

## 2020-10-09 PROCEDURE — 45385 COLONOSCOPY W/LESION REMOVAL: CPT | Mod: ,,, | Performed by: INTERNAL MEDICINE

## 2020-10-09 PROCEDURE — D9220A PRA ANESTHESIA: Mod: ANES,,, | Performed by: ANESTHESIOLOGY

## 2020-10-09 PROCEDURE — 37000009 HC ANESTHESIA EA ADD 15 MINS: Mod: PO | Performed by: INTERNAL MEDICINE

## 2020-10-09 PROCEDURE — 25000003 PHARM REV CODE 250: Mod: PO | Performed by: NURSE ANESTHETIST, CERTIFIED REGISTERED

## 2020-10-09 PROCEDURE — 88305 TISSUE EXAM BY PATHOLOGIST: ICD-10-PCS | Mod: 26,,, | Performed by: PATHOLOGY

## 2020-10-09 PROCEDURE — 63600175 PHARM REV CODE 636 W HCPCS: Mod: PO | Performed by: INTERNAL MEDICINE

## 2020-10-09 PROCEDURE — 45385 COLONOSCOPY W/LESION REMOVAL: CPT | Mod: PO | Performed by: INTERNAL MEDICINE

## 2020-10-09 RX ORDER — PROPOFOL 10 MG/ML
VIAL (ML) INTRAVENOUS CONTINUOUS PRN
Status: DISCONTINUED | OUTPATIENT
Start: 2020-10-09 | End: 2020-10-09

## 2020-10-09 RX ORDER — SODIUM CHLORIDE, SODIUM LACTATE, POTASSIUM CHLORIDE, CALCIUM CHLORIDE 600; 310; 30; 20 MG/100ML; MG/100ML; MG/100ML; MG/100ML
INJECTION, SOLUTION INTRAVENOUS CONTINUOUS
Status: DISCONTINUED | OUTPATIENT
Start: 2020-10-09 | End: 2020-10-09 | Stop reason: HOSPADM

## 2020-10-09 RX ORDER — LIDOCAINE HCL/PF 100 MG/5ML
SYRINGE (ML) INTRAVENOUS
Status: DISCONTINUED | OUTPATIENT
Start: 2020-10-09 | End: 2020-10-09

## 2020-10-09 RX ORDER — PROPOFOL 10 MG/ML
VIAL (ML) INTRAVENOUS
Status: DISCONTINUED | OUTPATIENT
Start: 2020-10-09 | End: 2020-10-09

## 2020-10-09 RX ORDER — ONDANSETRON 2 MG/ML
INJECTION INTRAMUSCULAR; INTRAVENOUS
Status: DISCONTINUED | OUTPATIENT
Start: 2020-10-09 | End: 2020-10-09

## 2020-10-09 RX ORDER — SODIUM CHLORIDE 0.9 % (FLUSH) 0.9 %
10 SYRINGE (ML) INJECTION EVERY 6 HOURS PRN
Status: DISCONTINUED | OUTPATIENT
Start: 2020-10-09 | End: 2020-10-09 | Stop reason: HOSPADM

## 2020-10-09 RX ADMIN — ONDANSETRON 4 MG: 2 INJECTION, SOLUTION INTRAMUSCULAR; INTRAVENOUS at 08:10

## 2020-10-09 RX ADMIN — PROPOFOL 150 MCG/KG/MIN: 10 INJECTION, EMULSION INTRAVENOUS at 08:10

## 2020-10-09 RX ADMIN — PROPOFOL 40 MG: 10 INJECTION, EMULSION INTRAVENOUS at 08:10

## 2020-10-09 RX ADMIN — LIDOCAINE HYDROCHLORIDE 100 MG: 20 INJECTION, SOLUTION INTRAVENOUS at 08:10

## 2020-10-09 RX ADMIN — SODIUM CHLORIDE, SODIUM LACTATE, POTASSIUM CHLORIDE, AND CALCIUM CHLORIDE: .6; .31; .03; .02 INJECTION, SOLUTION INTRAVENOUS at 07:10

## 2020-10-09 RX ADMIN — PROPOFOL 100 MG: 10 INJECTION, EMULSION INTRAVENOUS at 08:10

## 2020-10-09 NOTE — PLAN OF CARE
Patient awake, alert and oriented. declines po at this time.. Denies complaints of pain. Discharge instructions reviewed with patient. Patient verbalized understanding. Patient discharged home with family.

## 2020-10-09 NOTE — [employer] PATIENT INSTRUCTIONS
Discharge Summary/Instructions after an Endoscopic Procedure  Patient Name: Tegan Alarcon  Patient MRN: [id number]  Patient YOB: 1955 Friday, October 9, 2020  Surinder Conley MD  RESTRICTIONS:  During your procedure today, you received medications for sedation.  These   medications may affect your judgment, balance and coordination.  Therefore,   for 24 hours, you have the following restrictions:   - DO NOT drive a car, operate machinery, make legal/financial decisions,   sign important papers or drink alcohol.    ACTIVITY:  Today: no heavy lifting, straining or running due to procedural   sedation/anesthesia.  The following day: return to full activity including work.  DIET:  Eat and drink normally unless instructed otherwise.     TREATMENT FOR COMMON SIDE EFFECTS:  - Mild abdominal pain, nausea, belching, bloating or excessive gas:  rest,   eat lightly and use a heating pad.  - Sore Throat: treat with throat lozenges and/or gargle with warm salt   water.  - Because air was used during the procedure, expelling large amounts of air   from your rectum or belching is normal.  - If a bowel prep was taken, you may not have a bowel movement for 1-3 days.    This is normal.  SYMPTOMS TO WATCH FOR AND REPORT TO YOUR PHYSICIAN:  1. Abdominal pain or bloating, other than gas cramps.  2. Chest pain.  3. Back pain.  4. Signs of infection such as: chills or fever occurring within 24 hours   after the procedure.  5. Rectal bleeding, which would show as bright red, maroon, or black stools.   (A tablespoon of blood from the rectum is not serious, especially if   hemorrhoids are present.)  6. Vomiting.  7. Weakness or dizziness.  GO DIRECTLY TO THE NEAREST EMERGENCY ROOM IF YOU HAVE ANY OF THE FOLLOWING:      Difficulty breathing              Chills and/or fever over 101 F   Persistent vomiting and/or vomiting blood   Severe abdominal pain   Severe chest pain   Black, tarry stools   Bleeding- more than one  tablespoon   Any other symptom or condition that you feel may need urgent attention  Your doctor recommends these additional instructions:  If any biopsies were taken, your doctors clinic will contact you in 1 to 2   weeks with any results.  Your physician has recommended a repeat colonoscopy in five years for   surveillance.   You are being discharged to home.   Advance your diet as tolerated.   Continue your present medications.   We are waiting for your pathology results.  For questions, problems or results please call your physician - Surinder Conley MD at Work:  [phone].  EMERGENCY PHONE NUMBER: [phone], LAB RESULTS: [phone]  IF A COMPLICATION OR EMERGENCY SITUATION ARISES AND YOU ARE UNABLE TO REACH   YOUR PHYSICIAN - GO DIRECTLY TO THE EMERGENCY ROOM.  ___________________________________________  Nurse Signature  ___________________________________________  Patient/Designated Responsible Party Signature  Surinder Conley MD  10/9/2020 9:05:49 AM  This report has been verified and signed electronically.  [employer]

## 2020-10-09 NOTE — H&P
History & Physical - Short Stay  Gastroenterology    SUBJECTIVE:     Procedure: Colonoscopy    Chief Complaint/Indication for Procedure: Positive FIT    PTA Medications   Medication Sig    CALCIUM CARBONATE/VITAMIN D3 (CALCIUM 500 + D, D3, ORAL) Take by mouth 2 (two) times daily.      ESTRACE 0.01 % (0.1 mg/gram) vaginal cream Place 1 g vaginally twice a week.     lisinopriL 10 MG tablet TAKE 1 TABLET(10 MG) BY MOUTH EVERY DAY    multivitamin capsule Take 1 capsule by mouth once daily.    BEPREVE 1.5 % Drop INSTILL 1 DROP IN BOTH EYES BID     Review of patient's allergies indicates:  No Known Allergies     Past Medical History:   Diagnosis Date    Bone disorder     pre osteoporosis    Hypertension     diagnosed 6/2014    PONV (postoperative nausea and vomiting)      Past Surgical History:   Procedure Laterality Date    breast biopsies      BREAST SURGERY  2000    EYE SURGERY  2018    HYSTERECTOMY  1999    TONSILLECTOMY  1977    TOTAL ABDOMINAL HYSTERECTOMY W/ BILATERAL SALPINGOOPHORECTOMY       Family History   Problem Relation Age of Onset    Breast cancer Mother 45    Breast cancer Unknown         cousins/several aunts    Heart disease Father     Hypertension Father     Hypertension Paternal Grandmother     Stroke Paternal Grandmother      Social History     Tobacco Use    Smoking status: Never Smoker    Smokeless tobacco: Never Used   Substance Use Topics    Alcohol use: No    Drug use: Never     OBJECTIVE:     Vital Signs (Most Recent)  Temp: 97.7 °F (36.5 °C) (10/09/20 0745)  Pulse: 95 (10/09/20 0745)  Resp: 17 (10/09/20 0745)  BP: 119/72 (10/09/20 0745)  SpO2: 97 % (10/09/20 0745)    Physical Exam:                                                       GENERAL:  Comfortable, in no acute distress.                                 HEENT EXAM:  Nonicteric.  No adenopathy.  Oropharynx is clear.               NECK:  Supple.                                                               LUNGS:   Clear.                                                               CARDIAC:  Regular rate and rhythm.  S1, S2.  No murmur.                      ABDOMEN:  Soft, positive bowel sounds, nontender.  No hepatosplenomegaly or masses.  No rebound or guarding.                                             EXTREMITIES:  No edema.     MENTAL STATUS:  Normal, alert and oriented.      ASSESSMENT/PLAN:     Assessment: Positive FIT    Plan: Colonoscopy    Anesthesia Plan: General    ASA Grade: ASA 2 - Patient with mild systemic disease with no functional limitations    MALLAMPATI SCORE:  II (hard and soft palate, upper portion of tonsils anduvula visible)

## 2020-10-09 NOTE — ANESTHESIA POSTPROCEDURE EVALUATION
Anesthesia Post Evaluation    Patient: Tegan Alarcon    Procedure(s) Performed: Procedure(s) (LRB):  COLONOSCOPY (N/A)    Final Anesthesia Type: general    Patient location during evaluation: PACU  Patient participation: Yes- Able to Participate  Level of consciousness: awake and alert and oriented  Post-procedure vital signs: reviewed and stable  Pain management: adequate  Airway patency: patent    PONV status at discharge: No PONV  Anesthetic complications: no      Cardiovascular status: blood pressure returned to baseline and stable  Respiratory status: unassisted and spontaneous ventilation  Hydration status: euvolemic  Follow-up not needed.          Vitals Value Taken Time   /79 10/09/20 0930   Temp 36.1 °C (97 °F) 10/09/20 0930   Pulse 76 10/09/20 0930   Resp 14 10/09/20 0930   SpO2 100 % 10/09/20 0930         Event Time   Out of Recovery 09:48:00         Pain/Rudolph Score: Rudolph Score: 10 (10/9/2020  9:30 AM)

## 2020-10-09 NOTE — TRANSFER OF CARE
"Anesthesia Transfer of Care Note    Patient: Tegan Alarcon    Procedure(s) Performed: Procedure(s) (LRB):  COLONOSCOPY (N/A)    Patient location: PACU    Anesthesia Type: general    Transport from OR: Transported from OR on room air with adequate spontaneous ventilation    Post pain: adequate analgesia    Post assessment: no apparent anesthetic complications and tolerated procedure well    Post vital signs: stable    Level of consciousness: awake and sedated    Nausea/Vomiting: no nausea/vomiting    Complications: none    Transfer of care protocol was followed      Last vitals:   Visit Vitals  /72 (BP Location: Right arm, Patient Position: Lying)   Pulse 95   Temp 36.5 °C (97.7 °F) (Skin)   Resp 17   Ht 5' 4" (1.626 m)   Wt 68 kg (150 lb)   SpO2 97%   Breastfeeding No   BMI 25.75 kg/m²     "

## 2020-10-09 NOTE — ANESTHESIA PREPROCEDURE EVALUATION
10/09/2020  Tegan Alarcon is a 65 y.o., female.    Anesthesia Evaluation    I have reviewed the Patient Summary Reports.    I have reviewed the Nursing Notes.    I have reviewed the Medications.     Review of Systems  Anesthesia Hx:  Hx of Anesthetic complications (PONV)    Social:  Non-Smoker    Cardiovascular:   Hypertension, well controlled hyperlipidemia    Pulmonary:  Pulmonary Normal    Renal/:  Renal/ Normal     Neurological:  Neurology Normal    Endocrine:  Endocrine Normal        Physical Exam  General:  Well nourished    Airway/Jaw/Neck:  Airway Findings: Mouth Opening: Normal Tongue: Normal  General Airway Assessment: Adult  Oropharynx Findings:  Mallampati: II  Jaw/Neck Findings:  Neck ROM: Normal ROM     Eyes/Ears/Nose:  Eyes/Ears/Nose Findings:    Dental:  Dental Findings:   Chest/Lungs:  Chest/Lungs Findings: Normal Respiratory Rate     Heart/Vascular:  Heart Findings: Rate: Normal  Rhythm: Regular Rhythm        Mental Status:  Mental Status Findings:  Cooperative, Alert and Oriented         Anesthesia Plan  Type of Anesthesia, risks & benefits discussed:  Anesthesia Type:  general  Patient's Preference:   Intra-op Monitoring Plan: standard ASA monitors  Intra-op Monitoring Plan Comments:   Post Op Pain Control Plan: multimodal analgesia  Post Op Pain Control Plan Comments:   Induction:   IV  Beta Blocker:  Patient is not currently on a Beta-Blocker (No further documentation required).       Informed Consent: Patient understands risks and agrees with Anesthesia plan.  Questions answered. Anesthesia consent signed with patient.  ASA Score: 2     Day of Surgery Review of History & Physical:            Ready For Surgery From Anesthesia Perspective.

## 2020-10-12 ENCOUNTER — NURSE TRIAGE (OUTPATIENT)
Dept: ADMINISTRATIVE | Facility: CLINIC | Age: 65
End: 2020-10-12

## 2020-10-12 VITALS
HEIGHT: 64 IN | WEIGHT: 150 LBS | RESPIRATION RATE: 14 BRPM | HEART RATE: 76 BPM | DIASTOLIC BLOOD PRESSURE: 79 MMHG | SYSTOLIC BLOOD PRESSURE: 158 MMHG | TEMPERATURE: 97 F | OXYGEN SATURATION: 100 % | BODY MASS INDEX: 25.61 KG/M2

## 2020-10-12 NOTE — TELEPHONE ENCOUNTER
Reason for Disposition   [1] MILD-MODERATE abdomen pain (e.g., does not interfere with normal activities) AND [2] pain comes and goes (cramps) [3] lasting > 48 hours    Additional Information   Negative: Shock suspected (e.g., cold/pale/clammy skin, too weak to stand, low BP, rapid pulse)   Negative: Difficult to awaken or acting confused (e.g., disoriented, slurred speech)   Negative: Passed out (i.e., lost consciousness, collapsed and was not responding)   Negative: Sounds like a life-threatening emergency to the triager   Negative: Breathing difficulty   Negative: Chest pain   Negative: [1] Abdomen pain is main concern AND [2] started > 3 days (72 hours) after colonoscopy AND [3] Female   Negative: [1] Abdomen pain is main concern AND [2] started > 3 days (72 hours) after colonoscopy AND [3] male   Negative: [1] Vomiting is main concern AND [2] started > 3 days after colonoscopy   Negative: SEVERE abdomen pain (e.g., excruciating)   Negative: SEVERE rectal bleeding (large blood clots; on and off, or constant bleeding)   Negative: SEVERE dizziness (e.g., unable to stand, requires support to walk, feels like passing out now)   Negative: SEVERE vomiting (e.g., 6 or more times/day)   Negative: [1] MODERATE rectal bleeding (small blood clots, passing blood without stool, or toilet water turns red) AND [2] more than once   Negative: [1] MILD-MODERATE abdomen pain AND [2] constant AND [3] present > 2 hours   Negative: [1] Drinking very little AND [2] dehydration suspected (e.g., no urine > 12 hours, very dry mouth, very lightheaded)   Negative: Patient sounds very sick or weak to the triager   Negative: Fever > 100.4 F (38.0 C)   Negative: [1] Abdominal bloating, cramping, nausea, or vomiting while drinking bowel prep AND [2] CANNOT finish bowel prep AND [3] has tried recommended Care Advice   Negative: [1] Caller has URGENT question or concern AND [2] triager unable to answer  question    Protocols used: COLONOSCOPY SYMPTOMS AND [id number]    Pt stated she has been having pain in her left side since Friday after her colonoscopy. Stated the pain is not there when she is sitting but as soon as she moves it comes back. Rates pain as 2/10 and wants to know if this is normal.     Advised a message will be sent to the Provider's office to contact her today. Advised to see a MD in 24 hrs and to call back for worsening symptoms. Pt verbalized understanding.

## 2020-10-14 ENCOUNTER — PATIENT MESSAGE (OUTPATIENT)
Dept: GASTROENTEROLOGY | Facility: HOSPITAL | Age: 65
End: 2020-10-14

## 2020-10-14 ENCOUNTER — TELEPHONE (OUTPATIENT)
Dept: GASTROENTEROLOGY | Facility: CLINIC | Age: 65
End: 2020-10-14

## 2020-10-14 NOTE — TELEPHONE ENCOUNTER
Patient states that she was told to call with an update, she is still having the abd pain, it is still about the same as when she spoke with you the other day. Maybe slightly better.

## 2020-10-14 NOTE — TELEPHONE ENCOUNTER
----- Message from Best Corcoran sent at 10/14/2020 12:23 PM CDT -----  Regarding: Patient advice  Contact: pt  Pt called in regards to having pain in left leg     Pt stated she would like to come in and have it looked at       Pt can be reached at [phone]

## 2020-10-16 ENCOUNTER — HOSPITAL ENCOUNTER (OUTPATIENT)
Dept: RADIOLOGY | Facility: HOSPITAL | Age: 65
Discharge: HOME | End: 2020-10-16
Attending: INTERNAL MEDICINE
Payer: COMMERCIAL

## 2020-10-16 DIAGNOSIS — R10.32 LEFT LOWER QUADRANT ABDOMINAL PAIN: Primary | ICD-10-CM

## 2020-10-16 DIAGNOSIS — R10.32 LEFT LOWER QUADRANT ABDOMINAL PAIN: ICD-10-CM

## 2020-10-16 PROCEDURE — 74177 CT ABD & PELVIS W/CONTRAST: CPT | Mod: TC,PO

## 2020-10-16 PROCEDURE — 74177 CT ABD & PELVIS W/CONTRAST: CPT | Mod: 26,,, | Performed by: RADIOLOGY

## 2020-10-16 PROCEDURE — 74177 CT ABDOMEN PELVIS WITH CONTRAST: ICD-10-PCS | Mod: 26,,, | Performed by: RADIOLOGY

## 2020-10-16 PROCEDURE — 25500020 PHARM REV CODE 255: Mod: PO | Performed by: INTERNAL MEDICINE

## 2020-10-16 RX ADMIN — IOHEXOL 75 ML: 350 INJECTION, SOLUTION INTRAVENOUS at 03:10

## 2020-10-19 LAB
FINAL PATHOLOGIC DIAGNOSIS: NORMAL
GROSS: NORMAL
Lab: NORMAL

## 2020-10-20 ENCOUNTER — TELEPHONE (OUTPATIENT)
Dept: FAMILY MEDICINE | Facility: CLINIC | Age: 65
End: 2020-10-20

## 2020-10-20 DIAGNOSIS — R91.1 SOLITARY PULMONARY NODULE: ICD-10-CM

## 2020-10-26 RX ORDER — LISINOPRIL 10 MG/1
TABLET ORAL
Qty: 90 TABLET | Refills: 1 | Status: SHIPPED | OUTPATIENT
Start: 2020-10-26 | End: 2021-04-23

## 2020-11-12 ENCOUNTER — TELEPHONE (OUTPATIENT)
Dept: GASTROENTEROLOGY | Facility: CLINIC | Age: 65
End: 2020-11-12

## 2020-11-12 NOTE — TELEPHONE ENCOUNTER
----- Message from Rosa Rios sent at 11/12/2020 10:04 AM CST -----  Regarding: pt  Type: Patient Call Back    Who called:pt    What is the request in detail: pt is responding to doctor. Still having pain in thigh. Call pt    Can the clinic reply by [facility]?    Would the patient rather a call back or a response via [facility]? call    Best call back number:[phone] (home)       Additional Information:

## 2020-11-18 ENCOUNTER — PATIENT MESSAGE (OUTPATIENT)
Dept: GASTROENTEROLOGY | Facility: HOSPITAL | Age: 65
End: 2020-11-18

## 2021-01-04 ENCOUNTER — PATIENT MESSAGE (OUTPATIENT)
Dept: ADMINISTRATIVE | Facility: HOSPITAL | Age: 66
End: 2021-01-04

## 2021-03-18 ENCOUNTER — TELEPHONE (OUTPATIENT)
Dept: PHYSICAL MEDICINE AND REHAB | Facility: CLINIC | Age: 66
End: 2021-03-18

## 2021-03-18 ENCOUNTER — OFFICE VISIT (OUTPATIENT)
Dept: FAMILY MEDICINE | Facility: CLINIC | Age: 66
End: 2021-03-18
Payer: COMMERCIAL

## 2021-03-18 VITALS
RESPIRATION RATE: 17 BRPM | HEART RATE: 78 BPM | HEIGHT: 64 IN | WEIGHT: 149.94 LBS | DIASTOLIC BLOOD PRESSURE: 82 MMHG | BODY MASS INDEX: 25.6 KG/M2 | TEMPERATURE: 98 F | SYSTOLIC BLOOD PRESSURE: 126 MMHG

## 2021-03-18 DIAGNOSIS — Z78.0 POSTMENOPAUSAL STATE: ICD-10-CM

## 2021-03-18 DIAGNOSIS — M25.521 ELBOW PAIN, RIGHT: Primary | ICD-10-CM

## 2021-03-18 PROCEDURE — 3079F PR MOST RECENT DIASTOLIC BLOOD PRESSURE 80-89 MM HG: ICD-10-PCS | Mod: CPTII,S$GLB,, | Performed by: FAMILY MEDICINE

## 2021-03-18 PROCEDURE — 99999 PR PBB SHADOW E&M-EST. PATIENT-LVL IV: CPT | Mod: PBBFAC,,, | Performed by: FAMILY MEDICINE

## 2021-03-18 PROCEDURE — 99213 PR OFFICE/OUTPT VISIT, EST, LEVL III, 20-29 MIN: ICD-10-PCS | Mod: S$GLB,,, | Performed by: FAMILY MEDICINE

## 2021-03-18 PROCEDURE — 1125F AMNT PAIN NOTED PAIN PRSNT: CPT | Mod: S$GLB,,, | Performed by: FAMILY MEDICINE

## 2021-03-18 PROCEDURE — 3074F PR MOST RECENT SYSTOLIC BLOOD PRESSURE < 130 MM HG: ICD-10-PCS | Mod: CPTII,S$GLB,, | Performed by: FAMILY MEDICINE

## 2021-03-18 PROCEDURE — 3074F SYST BP LT 130 MM HG: CPT | Mod: CPTII,S$GLB,, | Performed by: FAMILY MEDICINE

## 2021-03-18 PROCEDURE — 3079F DIAST BP 80-89 MM HG: CPT | Mod: CPTII,S$GLB,, | Performed by: FAMILY MEDICINE

## 2021-03-18 PROCEDURE — 3008F BODY MASS INDEX DOCD: CPT | Mod: CPTII,S$GLB,, | Performed by: FAMILY MEDICINE

## 2021-03-18 PROCEDURE — 1125F PR PAIN SEVERITY QUANTIFIED, PAIN PRESENT: ICD-10-PCS | Mod: S$GLB,,, | Performed by: FAMILY MEDICINE

## 2021-03-18 PROCEDURE — 3008F PR BODY MASS INDEX (BMI) DOCUMENTED: ICD-10-PCS | Mod: CPTII,S$GLB,, | Performed by: FAMILY MEDICINE

## 2021-03-18 PROCEDURE — 99213 OFFICE O/P EST LOW 20 MIN: CPT | Mod: S$GLB,,, | Performed by: FAMILY MEDICINE

## 2021-03-18 PROCEDURE — 99999 PR PBB SHADOW E&M-EST. PATIENT-LVL IV: ICD-10-PCS | Mod: PBBFAC,,, | Performed by: FAMILY MEDICINE

## 2021-03-18 RX ORDER — CYCLOBENZAPRINE HCL 5 MG
5 TABLET ORAL 3 TIMES DAILY PRN
Qty: 10 TABLET | Refills: 0 | Status: SHIPPED | OUTPATIENT
Start: 2021-03-18 | End: 2021-03-28

## 2021-03-18 RX ORDER — MAGNESIUM 250 MG
1 TABLET ORAL NIGHTLY PRN
COMMUNITY
Start: 2020-09-06

## 2021-03-18 RX ORDER — ACETAMINOPHEN 500 MG
500 TABLET ORAL EVERY 6 HOURS PRN
COMMUNITY

## 2021-03-18 RX ORDER — METHYLPREDNISOLONE 4 MG/1
TABLET ORAL
Qty: 1 PACKAGE | Refills: 0 | Status: SHIPPED | OUTPATIENT
Start: 2021-03-18 | End: 2021-04-08

## 2021-03-22 ENCOUNTER — PATIENT OUTREACH (OUTPATIENT)
Dept: ADMINISTRATIVE | Facility: OTHER | Age: 66
End: 2021-03-22

## 2021-03-22 ENCOUNTER — OFFICE VISIT (OUTPATIENT)
Dept: PHYSICAL MEDICINE AND REHAB | Facility: CLINIC | Age: 66
End: 2021-03-22
Payer: COMMERCIAL

## 2021-03-22 VITALS — BODY MASS INDEX: 25.44 KG/M2 | HEIGHT: 64 IN | WEIGHT: 149 LBS

## 2021-03-22 DIAGNOSIS — M25.521 ELBOW PAIN, RIGHT: ICD-10-CM

## 2021-03-22 DIAGNOSIS — M67.911 TENDINOPATHY OF RIGHT ROTATOR CUFF: Primary | ICD-10-CM

## 2021-03-22 PROCEDURE — 99203 OFFICE O/P NEW LOW 30 MIN: CPT | Mod: S$GLB,,, | Performed by: PHYSICAL MEDICINE & REHABILITATION

## 2021-03-22 PROCEDURE — 3008F BODY MASS INDEX DOCD: CPT | Mod: CPTII,S$GLB,, | Performed by: PHYSICAL MEDICINE & REHABILITATION

## 2021-03-22 PROCEDURE — 99203 PR OFFICE/OUTPT VISIT, NEW, LEVL III, 30-44 MIN: ICD-10-PCS | Mod: S$GLB,,, | Performed by: PHYSICAL MEDICINE & REHABILITATION

## 2021-03-22 PROCEDURE — 1125F AMNT PAIN NOTED PAIN PRSNT: CPT | Mod: S$GLB,,, | Performed by: PHYSICAL MEDICINE & REHABILITATION

## 2021-03-22 PROCEDURE — 99999 PR PBB SHADOW E&M-EST. PATIENT-LVL IV: CPT | Mod: PBBFAC,,, | Performed by: PHYSICAL MEDICINE & REHABILITATION

## 2021-03-22 PROCEDURE — 1125F PR PAIN SEVERITY QUANTIFIED, PAIN PRESENT: ICD-10-PCS | Mod: S$GLB,,, | Performed by: PHYSICAL MEDICINE & REHABILITATION

## 2021-03-22 PROCEDURE — 99999 PR PBB SHADOW E&M-EST. PATIENT-LVL IV: ICD-10-PCS | Mod: PBBFAC,,, | Performed by: PHYSICAL MEDICINE & REHABILITATION

## 2021-03-22 PROCEDURE — 3008F PR BODY MASS INDEX (BMI) DOCUMENTED: ICD-10-PCS | Mod: CPTII,S$GLB,, | Performed by: PHYSICAL MEDICINE & REHABILITATION

## 2021-05-06 ENCOUNTER — PATIENT MESSAGE (OUTPATIENT)
Dept: RESEARCH | Facility: HOSPITAL | Age: 66
End: 2021-05-06

## 2021-05-18 ENCOUNTER — HOSPITAL ENCOUNTER (OUTPATIENT)
Dept: RADIOLOGY | Facility: HOSPITAL | Age: 66
Discharge: HOME | End: 2021-05-18
Attending: FAMILY MEDICINE
Payer: COMMERCIAL

## 2021-05-18 DIAGNOSIS — Z78.0 POSTMENOPAUSAL STATE: ICD-10-CM

## 2021-05-18 PROCEDURE — 77080 DXA BONE DENSITY AXIAL: CPT | Mod: 26,,, | Performed by: RADIOLOGY

## 2021-05-18 PROCEDURE — 77080 DXA BONE DENSITY AXIAL: CPT | Mod: TC,PO

## 2021-05-18 PROCEDURE — 77080 DEXA BONE DENSITY SPINE HIP: ICD-10-PCS | Mod: 26,,, | Performed by: RADIOLOGY

## 2021-07-13 ENCOUNTER — LAB VISIT (OUTPATIENT)
Dept: LAB | Facility: HOSPITAL | Age: 66
End: 2021-07-13
Attending: FAMILY MEDICINE
Payer: COMMERCIAL

## 2021-07-13 DIAGNOSIS — E78.5 HYPERLIPIDEMIA, UNSPECIFIED HYPERLIPIDEMIA TYPE: ICD-10-CM

## 2021-07-13 LAB
ALBUMIN SERPL BCP-MCNC: 4 G/DL (ref 3.5–5.2)
ALP SERPL-CCNC: 73 U/L (ref 55–135)
ALT SERPL W/O P-5'-P-CCNC: 38 U/L (ref 10–44)
ANION GAP SERPL CALC-SCNC: 12 MMOL/L (ref 8–16)
AST SERPL-CCNC: 29 U/L (ref 10–40)
BILIRUB SERPL-MCNC: 0.6 MG/DL (ref 0.1–1)
BUN SERPL-MCNC: 20 MG/DL (ref 8–23)
CALCIUM SERPL-MCNC: 10 MG/DL (ref 8.7–10.5)
CHLORIDE SERPL-SCNC: 109 MMOL/L (ref 95–110)
CHOLEST SERPL-MCNC: 210 MG/DL (ref 120–199)
CHOLEST/HDLC SERPL: 3.9 {RATIO} (ref 2–5)
CO2 SERPL-SCNC: 22 MMOL/L (ref 23–29)
CREAT SERPL-MCNC: 0.9 MG/DL (ref 0.5–1.4)
ERYTHROCYTE [DISTWIDTH] IN BLOOD BY AUTOMATED COUNT: 12.6 % (ref 11.5–14.5)
EST. GFR  (AFRICAN AMERICAN): >60 ML/MIN/1.73 M^2
EST. GFR  (NON AFRICAN AMERICAN): >60 ML/MIN/1.73 M^2
GLUCOSE SERPL-MCNC: 86 MG/DL (ref 70–110)
HCT VFR BLD AUTO: 43.1 % (ref 37–48.5)
HDLC SERPL-MCNC: 54 MG/DL (ref 40–75)
HDLC SERPL: 25.7 % (ref 20–50)
HGB BLD-MCNC: 14.4 G/DL (ref 12–16)
LDLC SERPL CALC-MCNC: 121.8 MG/DL (ref 63–159)
MCH RBC QN AUTO: 31.1 PG (ref 27–31)
MCHC RBC AUTO-ENTMCNC: 33.4 G/DL (ref 32–36)
MCV RBC AUTO: 93 FL (ref 82–98)
NONHDLC SERPL-MCNC: 156 MG/DL
PLATELET # BLD AUTO: 198 K/UL (ref 150–450)
PMV BLD AUTO: 11.6 FL (ref 9.2–12.9)
POTASSIUM SERPL-SCNC: 4.3 MMOL/L (ref 3.5–5.1)
PROT SERPL-MCNC: 7 G/DL (ref 6–8.4)
RBC # BLD AUTO: 4.63 M/UL (ref 4–5.4)
SODIUM SERPL-SCNC: 143 MMOL/L (ref 136–145)
TRIGL SERPL-MCNC: 171 MG/DL (ref 30–150)
TSH SERPL DL<=0.005 MIU/L-ACNC: 1.5 UIU/ML (ref 0.4–4)
WBC # BLD AUTO: 5.5 K/UL (ref 3.9–12.7)

## 2021-07-13 PROCEDURE — 80053 COMPREHEN METABOLIC PANEL: CPT | Performed by: FAMILY MEDICINE

## 2021-07-13 PROCEDURE — 36415 COLL VENOUS BLD VENIPUNCTURE: CPT | Mod: PN | Performed by: FAMILY MEDICINE

## 2021-07-13 PROCEDURE — 85027 COMPLETE CBC AUTOMATED: CPT | Performed by: FAMILY MEDICINE

## 2021-07-13 PROCEDURE — 80061 LIPID PANEL: CPT | Performed by: FAMILY MEDICINE

## 2021-07-13 PROCEDURE — 84443 ASSAY THYROID STIM HORMONE: CPT | Performed by: FAMILY MEDICINE

## 2021-07-20 ENCOUNTER — OFFICE VISIT (OUTPATIENT)
Dept: FAMILY MEDICINE | Facility: CLINIC | Age: 66
End: 2021-07-20
Payer: COMMERCIAL

## 2021-07-20 VITALS
HEIGHT: 64 IN | RESPIRATION RATE: 16 BRPM | HEART RATE: 78 BPM | WEIGHT: 155.63 LBS | SYSTOLIC BLOOD PRESSURE: 134 MMHG | DIASTOLIC BLOOD PRESSURE: 82 MMHG | BODY MASS INDEX: 26.57 KG/M2

## 2021-07-20 DIAGNOSIS — M71.321 OTHER BURSAL CYST, RIGHT ELBOW: ICD-10-CM

## 2021-07-20 DIAGNOSIS — I10 ESSENTIAL HYPERTENSION: ICD-10-CM

## 2021-07-20 DIAGNOSIS — E78.5 HYPERLIPIDEMIA, UNSPECIFIED HYPERLIPIDEMIA TYPE: ICD-10-CM

## 2021-07-20 DIAGNOSIS — Z00.00 ROUTINE GENERAL MEDICAL EXAMINATION AT A HEALTH CARE FACILITY: Primary | ICD-10-CM

## 2021-07-20 PROCEDURE — 99396 PREV VISIT EST AGE 40-64: CPT | Mod: S$GLB,,, | Performed by: FAMILY MEDICINE

## 2021-07-20 PROCEDURE — 3079F DIAST BP 80-89 MM HG: CPT | Mod: CPTII,S$GLB,, | Performed by: FAMILY MEDICINE

## 2021-07-20 PROCEDURE — 3079F PR MOST RECENT DIASTOLIC BLOOD PRESSURE 80-89 MM HG: ICD-10-PCS | Mod: CPTII,S$GLB,, | Performed by: FAMILY MEDICINE

## 2021-07-20 PROCEDURE — 1126F AMNT PAIN NOTED NONE PRSNT: CPT | Mod: CPTII,S$GLB,, | Performed by: FAMILY MEDICINE

## 2021-07-20 PROCEDURE — 3075F SYST BP GE 130 - 139MM HG: CPT | Mod: CPTII,S$GLB,, | Performed by: FAMILY MEDICINE

## 2021-07-20 PROCEDURE — 1126F PR PAIN SEVERITY QUANTIFIED, NO PAIN PRESENT: ICD-10-PCS | Mod: CPTII,S$GLB,, | Performed by: FAMILY MEDICINE

## 2021-07-20 PROCEDURE — 3008F PR BODY MASS INDEX (BMI) DOCUMENTED: ICD-10-PCS | Mod: CPTII,S$GLB,, | Performed by: FAMILY MEDICINE

## 2021-07-20 PROCEDURE — 3075F PR MOST RECENT SYSTOLIC BLOOD PRESS GE 130-139MM HG: ICD-10-PCS | Mod: CPTII,S$GLB,, | Performed by: FAMILY MEDICINE

## 2021-07-20 PROCEDURE — 99999 PR PBB SHADOW E&M-EST. PATIENT-LVL IV: ICD-10-PCS | Mod: PBBFAC,,, | Performed by: FAMILY MEDICINE

## 2021-07-20 PROCEDURE — 99999 PR PBB SHADOW E&M-EST. PATIENT-LVL IV: CPT | Mod: PBBFAC,,, | Performed by: FAMILY MEDICINE

## 2021-07-20 PROCEDURE — 99396 PR PREVENTIVE VISIT,EST,40-64: ICD-10-PCS | Mod: S$GLB,,, | Performed by: FAMILY MEDICINE

## 2021-07-20 PROCEDURE — 3008F BODY MASS INDEX DOCD: CPT | Mod: CPTII,S$GLB,, | Performed by: FAMILY MEDICINE

## 2021-08-04 ENCOUNTER — HOSPITAL ENCOUNTER (OUTPATIENT)
Dept: RADIOLOGY | Facility: HOSPITAL | Age: 66
Discharge: HOME | End: 2021-08-04
Attending: FAMILY MEDICINE
Payer: COMMERCIAL

## 2021-08-04 DIAGNOSIS — M71.321 OTHER BURSAL CYST, RIGHT ELBOW: ICD-10-CM

## 2021-08-04 PROCEDURE — 76882 US LMTD JT/FCL EVL NVASC XTR: CPT | Mod: TC,PO,RT

## 2021-08-04 PROCEDURE — 76882 US LMTD JT/FCL EVL NVASC XTR: CPT | Mod: 26,RT,, | Performed by: RADIOLOGY

## 2021-08-04 PROCEDURE — 76882 US EXTREMITY NON VASCULAR LIMITED RIGHT: ICD-10-PCS | Mod: 26,RT,, | Performed by: RADIOLOGY

## 2021-09-15 DIAGNOSIS — Z12.31 OTHER SCREENING MAMMOGRAM: ICD-10-CM

## 2021-10-18 ENCOUNTER — HOSPITAL ENCOUNTER (OUTPATIENT)
Dept: RADIOLOGY | Facility: HOSPITAL | Age: 66
Discharge: HOME | End: 2021-10-18
Attending: FAMILY MEDICINE
Payer: COMMERCIAL

## 2021-10-18 DIAGNOSIS — R91.1 SOLITARY PULMONARY NODULE: ICD-10-CM

## 2021-10-18 PROCEDURE — 71250 CT THORAX DX C-: CPT | Mod: TC,PO

## 2021-10-18 PROCEDURE — 71250 CT THORAX DX C-: CPT | Mod: 26,,, | Performed by: RADIOLOGY

## 2021-10-18 PROCEDURE — 71250 CT CHEST WITHOUT CONTRAST: ICD-10-PCS | Mod: 26,,, | Performed by: RADIOLOGY

## 2021-11-02 ENCOUNTER — HOSPITAL ENCOUNTER (OUTPATIENT)
Dept: RADIOLOGY | Facility: HOSPITAL | Age: 66
Discharge: HOME | End: 2021-11-02
Attending: FAMILY MEDICINE
Payer: COMMERCIAL

## 2021-11-02 ENCOUNTER — TELEPHONE (OUTPATIENT)
Dept: FAMILY MEDICINE | Facility: CLINIC | Age: 66
End: 2021-11-02
Payer: COMMERCIAL

## 2021-11-02 DIAGNOSIS — Z12.31 OTHER SCREENING MAMMOGRAM: ICD-10-CM

## 2021-11-02 PROCEDURE — 77063 BREAST TOMOSYNTHESIS BI: CPT | Mod: 26,,, | Performed by: RADIOLOGY

## 2021-11-02 PROCEDURE — 77067 SCR MAMMO BI INCL CAD: CPT | Mod: TC,PO

## 2021-11-02 PROCEDURE — 77063 MAMMO DIGITAL SCREENING BILAT WITH TOMO: ICD-10-PCS | Mod: 26,,, | Performed by: RADIOLOGY

## 2021-11-02 PROCEDURE — 77067 MAMMO DIGITAL SCREENING BILAT WITH TOMO: ICD-10-PCS | Mod: 26,,, | Performed by: RADIOLOGY

## 2021-11-02 PROCEDURE — 77067 SCR MAMMO BI INCL CAD: CPT | Mod: 26,,, | Performed by: RADIOLOGY

## 2021-11-10 ENCOUNTER — TELEPHONE (OUTPATIENT)
Dept: FAMILY MEDICINE | Facility: CLINIC | Age: 66
End: 2021-11-10
Payer: COMMERCIAL

## 2022-05-01 NOTE — TELEPHONE ENCOUNTER
Care Due:                  Date            Visit Type   Department     Provider  --------------------------------------------------------------------------------                                EP -                              PRIMARY      [facility]  Last Visit: 07-      CARE ([facility])   MEDICINE       Kari Miguel  Next Visit: None Scheduled  None         None Found                                                            Last  Test          Frequency    Reason                     Performed    Due Date  --------------------------------------------------------------------------------    Office Visit  12 months..  lisinopriL...............  07-   07-    CMP.........  12 months..  lisinopriL...............  07- 07-    Powered by [employer]. Reference number: [id number].   5/01/2022 3:24:29 AM CDT

## 2022-05-02 RX ORDER — LISINOPRIL 10 MG/1
TABLET ORAL
Qty: 90 TABLET | Refills: 0 | Status: SHIPPED | OUTPATIENT
Start: 2022-05-02 | End: 2022-08-01

## 2022-05-02 NOTE — TELEPHONE ENCOUNTER
Refill Authorization Note   Tegan Alarcon  is requesting a refill authorization.  Brief Assessment and Rationale for Refill:  Approve    -Medication-Related Problems Identified:   Requires labs  Requires appointment  Medication Therapy Plan:       Medication Reconciliation Completed: No   Comments:     Provider Staff:     Action is required for this patient.   Please see care gap opportunities below in Care Due Message.     Thanks!  [facility]     Appointments      Date Provider   Last Visit   7/20/2021 Kari Miguel MD   Next Visit   Visit date not found Kari Miguel MD     Note composed:1:19 PM 05/02/2022           Note composed:1:19 PM 05/02/2022

## 2022-07-29 ENCOUNTER — PATIENT MESSAGE (OUTPATIENT)
Dept: ADMINISTRATIVE | Facility: HOSPITAL | Age: 67
End: 2022-07-29
Payer: COMMERCIAL

## 2022-07-30 NOTE — TELEPHONE ENCOUNTER
Care Due:                  Date            Visit Type   Department     Provider  --------------------------------------------------------------------------------                                EP -                              PRIMARY      [facility] FAMILY  Last Visit: 07-      CARE ([facility])   MEDICINE       Kari Miguel  Next Visit: None Scheduled  None         None Found                                                            Last  Test          Frequency    Reason                     Performed    Due Date  --------------------------------------------------------------------------------    Office Visit  12 months..  lisinopriL...............  07-   07-    CMP.........  12 months..  lisinopriL...............  07- 07-    [employer] Embedded Care Gaps. Reference number: [id number]. 7/30/2022   3:23:54 AM CDT

## 2022-07-30 NOTE — TELEPHONE ENCOUNTER
Refill Routing Note   Medication(s) are not appropriate for processing by [facility] for the following reason(s):      - Required laboratory values are outdated  - Required vitals are outdated    [facility] action(s):  Defer Medication-related problems identified: Requires labs        Medication reconciliation completed: No     Appointments  past 12m or future 3m with PCP    Date Provider   Last Visit   7/20/2021 Kari Miguel MD   Next Visit   Visit date not found Kari Miguel MD   ED visits in past 90 days: 0        Note composed:2:14 PM 07/30/2022

## 2022-08-01 RX ORDER — LISINOPRIL 10 MG/1
TABLET ORAL
Qty: 90 TABLET | Refills: 1 | Status: SHIPPED | OUTPATIENT
Start: 2022-08-01 | End: 2023-02-11

## 2022-08-24 DIAGNOSIS — I10 HYPERTENSION: ICD-10-CM

## 2022-09-12 ENCOUNTER — OFFICE VISIT (OUTPATIENT)
Dept: FAMILY MEDICINE | Facility: CLINIC | Age: 67
End: 2022-09-12
Payer: MEDICARE

## 2022-09-12 VITALS
WEIGHT: 153.75 LBS | RESPIRATION RATE: 18 BRPM | DIASTOLIC BLOOD PRESSURE: 62 MMHG | BODY MASS INDEX: 26.25 KG/M2 | OXYGEN SATURATION: 99 % | SYSTOLIC BLOOD PRESSURE: 130 MMHG | HEART RATE: 74 BPM | HEIGHT: 64 IN

## 2022-09-12 DIAGNOSIS — E78.5 HYPERLIPIDEMIA, UNSPECIFIED HYPERLIPIDEMIA TYPE: ICD-10-CM

## 2022-09-12 DIAGNOSIS — Z23 IMMUNIZATION DUE: ICD-10-CM

## 2022-09-12 DIAGNOSIS — Z00.00 ROUTINE GENERAL MEDICAL EXAMINATION AT A HEALTH CARE FACILITY: Primary | ICD-10-CM

## 2022-09-12 PROCEDURE — 3075F SYST BP GE 130 - 139MM HG: CPT | Mod: CPTII,S$GLB,, | Performed by: FAMILY MEDICINE

## 2022-09-12 PROCEDURE — 1160F RVW MEDS BY RX/DR IN RCRD: CPT | Mod: CPTII,S$GLB,, | Performed by: FAMILY MEDICINE

## 2022-09-12 PROCEDURE — 3044F HG A1C LEVEL LT 7.0%: CPT | Mod: CPTII,S$GLB,, | Performed by: FAMILY MEDICINE

## 2022-09-12 PROCEDURE — 1101F PR PT FALLS ASSESS DOC 0-1 FALLS W/OUT INJ PAST YR: ICD-10-PCS | Mod: CPTII,S$GLB,, | Performed by: FAMILY MEDICINE

## 2022-09-12 PROCEDURE — 3288F FALL RISK ASSESSMENT DOCD: CPT | Mod: CPTII,S$GLB,, | Performed by: FAMILY MEDICINE

## 2022-09-12 PROCEDURE — 90670 PCV13 VACCINE IM: CPT | Mod: S$GLB,,, | Performed by: FAMILY MEDICINE

## 2022-09-12 PROCEDURE — G0009 PNEUMOCOCCAL CONJUGATE VACCINE 13-VALENT LESS THAN 5YO & GREATER THAN: ICD-10-PCS | Mod: S$GLB,,, | Performed by: FAMILY MEDICINE

## 2022-09-12 PROCEDURE — 3008F PR BODY MASS INDEX (BMI) DOCUMENTED: ICD-10-PCS | Mod: CPTII,S$GLB,, | Performed by: FAMILY MEDICINE

## 2022-09-12 PROCEDURE — 3078F DIAST BP <80 MM HG: CPT | Mod: CPTII,S$GLB,, | Performed by: FAMILY MEDICINE

## 2022-09-12 PROCEDURE — 99397 PR PREVENTIVE VISIT,EST,65 & OVER: ICD-10-PCS | Mod: 25,S$GLB,, | Performed by: FAMILY MEDICINE

## 2022-09-12 PROCEDURE — 99397 PER PM REEVAL EST PAT 65+ YR: CPT | Mod: 25,S$GLB,, | Performed by: FAMILY MEDICINE

## 2022-09-12 PROCEDURE — 4010F ACE/ARB THERAPY RXD/TAKEN: CPT | Mod: CPTII,S$GLB,, | Performed by: FAMILY MEDICINE

## 2022-09-12 PROCEDURE — 3078F PR MOST RECENT DIASTOLIC BLOOD PRESSURE < 80 MM HG: ICD-10-PCS | Mod: CPTII,S$GLB,, | Performed by: FAMILY MEDICINE

## 2022-09-12 PROCEDURE — 3075F PR MOST RECENT SYSTOLIC BLOOD PRESS GE 130-139MM HG: ICD-10-PCS | Mod: CPTII,S$GLB,, | Performed by: FAMILY MEDICINE

## 2022-09-12 PROCEDURE — 99999 PR PBB SHADOW E&M-EST. PATIENT-LVL IV: CPT | Mod: PBBFAC,,, | Performed by: FAMILY MEDICINE

## 2022-09-12 PROCEDURE — 1101F PT FALLS ASSESS-DOCD LE1/YR: CPT | Mod: CPTII,S$GLB,, | Performed by: FAMILY MEDICINE

## 2022-09-12 PROCEDURE — 1159F MED LIST DOCD IN RCRD: CPT | Mod: CPTII,S$GLB,, | Performed by: FAMILY MEDICINE

## 2022-09-12 PROCEDURE — 3008F BODY MASS INDEX DOCD: CPT | Mod: CPTII,S$GLB,, | Performed by: FAMILY MEDICINE

## 2022-09-12 PROCEDURE — 90670 PNEUMOCOCCAL CONJUGATE VACCINE 13-VALENT LESS THAN 5YO & GREATER THAN: ICD-10-PCS | Mod: S$GLB,,, | Performed by: FAMILY MEDICINE

## 2022-09-12 PROCEDURE — 1160F PR REVIEW ALL MEDS BY PRESCRIBER/CLIN PHARMACIST DOCUMENTED: ICD-10-PCS | Mod: CPTII,S$GLB,, | Performed by: FAMILY MEDICINE

## 2022-09-12 PROCEDURE — 99999 PR PBB SHADOW E&M-EST. PATIENT-LVL IV: ICD-10-PCS | Mod: PBBFAC,,, | Performed by: FAMILY MEDICINE

## 2022-09-12 PROCEDURE — 3044F PR MOST RECENT HEMOGLOBIN A1C LEVEL <7.0%: ICD-10-PCS | Mod: CPTII,S$GLB,, | Performed by: FAMILY MEDICINE

## 2022-09-12 PROCEDURE — 4010F PR ACE/ARB THEARPY RXD/TAKEN: ICD-10-PCS | Mod: CPTII,S$GLB,, | Performed by: FAMILY MEDICINE

## 2022-09-12 PROCEDURE — 3288F PR FALLS RISK ASSESSMENT DOCUMENTED: ICD-10-PCS | Mod: CPTII,S$GLB,, | Performed by: FAMILY MEDICINE

## 2022-09-12 PROCEDURE — 1159F PR MEDICATION LIST DOCUMENTED IN MEDICAL RECORD: ICD-10-PCS | Mod: CPTII,S$GLB,, | Performed by: FAMILY MEDICINE

## 2022-09-12 PROCEDURE — G0009 ADMIN PNEUMOCOCCAL VACCINE: HCPCS | Mod: S$GLB,,, | Performed by: FAMILY MEDICINE

## 2022-09-12 NOTE — PROGRESS NOTES
Subjective:       Patient ID: Tegan Alarcon is a 67 y.o. female.    Chief Complaint: Follow-up (Annual, wants labs)      Tegan Alarcon is in the office for annual exam.    HPI  Medical hx reviewed, no updates.   Past Medical History:   Diagnosis Date    Bone disorder     pre osteoporosis    Hypertension     diagnosed 6/2014    PONV (postoperative nausea and vomiting)          Current Outpatient Medications:     acetaminophen (TYLENOL) 500 MG tablet, Take 500 mg by mouth every 6 (six) hours as needed for Pain. Pt states she takes 500mg BID for arm pain. One in AM and one at HS, Disp: , Rfl:     CALCIUM CARBONATE/VITAMIN D3 (CALCIUM 500 + D, D3, ORAL), Take by mouth 2 (two) times daily.  , Disp: , Rfl:     lisinopriL 10 MG tablet, TAKE 1 TABLET(10 MG) BY MOUTH EVERY DAY, Disp: 90 tablet, Rfl: 1    magnesium 250 mg Tab, , Disp: , Rfl:     multivitamin capsule, Take 1 capsule by mouth once daily., Disp: , Rfl:     The 10-year ASCVD risk score (Nba DOWNING, et al., 2019) is: 7.9%    Values used to calculate the score:      Age: 67 years      Sex: Female      Is Non- : No      Diabetic: No      Tobacco smoker: No      Systolic Blood Pressure: 130 mmHg      Is BP treated: Yes      HDL Cholesterol: 54 mg/dL      Total Cholesterol: 210 mg/dL     Lab Results   Component Value Date    HGBA1C 5.1 10/18/2016     Lab Results   Component Value Date    LDLCALC 121.8 07/13/2021    CREATININE 0.9 07/13/2021   Labs 2021 rev.    Review of Systems   Constitutional:  Negative for fatigue and unexpected weight change.   HENT:  Negative for congestion, hearing loss, postnasal drip, rhinorrhea, sneezing and sore throat.    Eyes:  Negative for redness and visual disturbance.        Exam up to date   Respiratory:  Negative for apnea (+baby snoring, no reported apnea), cough (occ) and shortness of breath.    Cardiovascular:  Negative for chest pain, palpitations and leg swelling.   Gastrointestinal:  Negative for  abdominal pain, blood in stool and constipation (improved with miralax 1-2x/week).   Genitourinary:  Negative for difficulty urinating, dysuria and frequency (nighttime x 0-1).   Musculoskeletal:  Negative for arthralgias, back pain and myalgias.        Walking 2mi, 3x/week.   R shoulder - went to PT for this, better - and hip doing ok of late.   Skin:  Negative for color change and rash.        Saw derm/grieshaber for f/u.   Neurological:  Negative for dizziness, light-headedness and headaches.   Psychiatric/Behavioral:  Negative for dysphoric mood and sleep disturbance (magnesium helps). The patient is not nervous/anxious.        Objective:      Physical Exam  Vitals and nursing note reviewed.   Constitutional:       General: She is not in acute distress.     Appearance: Normal appearance. She is well-developed.   HENT:      Head: Normocephalic and atraumatic.      Right Ear: Tympanic membrane and external ear normal.      Left Ear: Tympanic membrane and external ear normal.      Nose: Nose normal.      Mouth/Throat:      Pharynx: No oropharyngeal exudate.   Eyes:      Conjunctiva/sclera: Conjunctivae normal.      Pupils: Pupils are equal, round, and reactive to light.   Neck:      Thyroid: No thyromegaly.   Cardiovascular:      Rate and Rhythm: Normal rate and regular rhythm.   Pulmonary:      Effort: Pulmonary effort is normal. No respiratory distress.      Breath sounds: Normal breath sounds. No wheezing.   Abdominal:      General: Bowel sounds are normal. There is no distension.      Palpations: Abdomen is soft. There is no mass.      Tenderness: There is no abdominal tenderness. There is no guarding or rebound.   Musculoskeletal:         General: Normal range of motion.      Cervical back: Normal range of motion and neck supple.      Right lower leg: No edema.      Left lower leg: No edema.   Lymphadenopathy:      Cervical: No cervical adenopathy.   Skin:     General: Skin is warm and dry.   Neurological:       General: No focal deficit present.      Mental Status: She is alert and oriented to person, place, and time.      Cranial Nerves: No cranial nerve deficit.   Psychiatric:         Mood and Affect: Mood normal.         Behavior: Behavior normal.           Screening recommendations appropriate to age and health status were reviewed.    Assessment & Plan:    Routine general medical examination at a health care facility  Comments:  anticipatory guidance reviewed  Orders:  -     CBC Without Differential; Future; Expected date: 09/12/2022  -     Comprehensive Metabolic Panel; Future; Expected date: 09/12/2022  -     TSH; Future; Expected date: 09/12/2022  -     Urinalysis, Reflex to Urine Culture Urine, Clean Catch; Future  -     Lipid Panel; Future; Expected date: 09/12/2022  -     Hemoglobin A1C; Future; Expected date: 09/12/2022  -     HIV 1/2 Ag/Ab (4th Gen); Future; Expected date: 09/12/2022    Immunization due    Other orders  -     (In Office Administered) Pneumococcal Conjugate Vaccine (13 Valent) (IM)

## 2022-09-15 ENCOUNTER — LAB VISIT (OUTPATIENT)
Dept: LAB | Facility: HOSPITAL | Age: 67
End: 2022-09-15
Attending: FAMILY MEDICINE
Payer: MEDICARE

## 2022-09-15 DIAGNOSIS — Z00.00 ROUTINE GENERAL MEDICAL EXAMINATION AT A HEALTH CARE FACILITY: ICD-10-CM

## 2022-09-15 LAB
ALBUMIN SERPL BCP-MCNC: 4.5 G/DL (ref 3.5–5.2)
ALP SERPL-CCNC: 85 U/L (ref 55–135)
ALT SERPL W/O P-5'-P-CCNC: 47 U/L (ref 10–44)
ANION GAP SERPL CALC-SCNC: 7 MMOL/L (ref 8–16)
AST SERPL-CCNC: 37 U/L (ref 10–40)
BILIRUB SERPL-MCNC: 0.6 MG/DL (ref 0.1–1)
BUN SERPL-MCNC: 23 MG/DL (ref 8–23)
CALCIUM SERPL-MCNC: 10 MG/DL (ref 8.7–10.5)
CHLORIDE SERPL-SCNC: 105 MMOL/L (ref 95–110)
CHOLEST SERPL-MCNC: 264 MG/DL (ref 120–199)
CHOLEST/HDLC SERPL: 4 {RATIO} (ref 2–5)
CO2 SERPL-SCNC: 23 MMOL/L (ref 23–29)
CREAT SERPL-MCNC: 1.1 MG/DL (ref 0.5–1.4)
ERYTHROCYTE [DISTWIDTH] IN BLOOD BY AUTOMATED COUNT: 12.8 % (ref 11.5–14.5)
EST. GFR  (NO RACE VARIABLE): 55.8 ML/MIN/1.73 M^2
ESTIMATED AVG GLUCOSE: 105 MG/DL (ref 68–131)
GLUCOSE SERPL-MCNC: 87 MG/DL (ref 70–110)
HBA1C MFR BLD: 5.3 % (ref 4–5.6)
HCT VFR BLD AUTO: 44.8 % (ref 37–48.5)
HDLC SERPL-MCNC: 66 MG/DL (ref 40–75)
HDLC SERPL: 25 % (ref 20–50)
HGB BLD-MCNC: 15.2 G/DL (ref 12–16)
HIV 1+2 AB+HIV1 P24 AG SERPL QL IA: NORMAL
LDLC SERPL CALC-MCNC: 164 MG/DL (ref 63–159)
MCH RBC QN AUTO: 31.9 PG (ref 27–31)
MCHC RBC AUTO-ENTMCNC: 33.9 G/DL (ref 32–36)
MCV RBC AUTO: 94 FL (ref 82–98)
NONHDLC SERPL-MCNC: 198 MG/DL
PLATELET # BLD AUTO: 289 K/UL (ref 150–450)
PMV BLD AUTO: 11 FL (ref 9.2–12.9)
POTASSIUM SERPL-SCNC: 4.6 MMOL/L (ref 3.5–5.1)
PROT SERPL-MCNC: 7.9 G/DL (ref 6–8.4)
RBC # BLD AUTO: 4.77 M/UL (ref 4–5.4)
SODIUM SERPL-SCNC: 135 MMOL/L (ref 136–145)
TRIGL SERPL-MCNC: 170 MG/DL (ref 30–150)
TSH SERPL DL<=0.005 MIU/L-ACNC: 1.66 UIU/ML (ref 0.4–4)
WBC # BLD AUTO: 5.02 K/UL (ref 3.9–12.7)

## 2022-09-15 PROCEDURE — 36415 COLL VENOUS BLD VENIPUNCTURE: CPT | Mod: PN | Performed by: FAMILY MEDICINE

## 2022-09-15 PROCEDURE — 83036 HEMOGLOBIN GLYCOSYLATED A1C: CPT | Performed by: FAMILY MEDICINE

## 2022-09-15 PROCEDURE — 87389 HIV-1 AG W/HIV-1&-2 AB AG IA: CPT | Performed by: FAMILY MEDICINE

## 2022-09-15 PROCEDURE — 84443 ASSAY THYROID STIM HORMONE: CPT | Performed by: FAMILY MEDICINE

## 2022-09-15 PROCEDURE — 80061 LIPID PANEL: CPT | Performed by: FAMILY MEDICINE

## 2022-09-15 PROCEDURE — 80053 COMPREHEN METABOLIC PANEL: CPT | Performed by: FAMILY MEDICINE

## 2022-09-15 PROCEDURE — 85027 COMPLETE CBC AUTOMATED: CPT | Performed by: FAMILY MEDICINE

## 2022-11-05 ENCOUNTER — HOSPITAL ENCOUNTER (OUTPATIENT)
Dept: RADIOLOGY | Facility: HOSPITAL | Age: 67
Discharge: HOME | End: 2022-11-05
Attending: FAMILY MEDICINE
Payer: MEDICARE

## 2022-11-05 DIAGNOSIS — Z12.31 ENCOUNTER FOR SCREENING MAMMOGRAM FOR MALIGNANT NEOPLASM OF BREAST: ICD-10-CM

## 2022-11-05 PROCEDURE — 77067 SCR MAMMO BI INCL CAD: CPT | Mod: 26,,, | Performed by: RADIOLOGY

## 2022-11-05 PROCEDURE — 77063 MAMMO DIGITAL SCREENING BILAT WITH TOMO: ICD-10-PCS | Mod: 26,,, | Performed by: RADIOLOGY

## 2022-11-05 PROCEDURE — 77063 BREAST TOMOSYNTHESIS BI: CPT | Mod: 26,,, | Performed by: RADIOLOGY

## 2022-11-05 PROCEDURE — 77063 BREAST TOMOSYNTHESIS BI: CPT | Mod: TC,PO

## 2022-11-05 PROCEDURE — 77067 MAMMO DIGITAL SCREENING BILAT WITH TOMO: ICD-10-PCS | Mod: 26,,, | Performed by: RADIOLOGY

## 2022-11-05 PROCEDURE — 77067 SCR MAMMO BI INCL CAD: CPT | Mod: TC,PO

## 2022-11-07 ENCOUNTER — TELEPHONE (OUTPATIENT)
Dept: RADIOLOGY | Facility: HOSPITAL | Age: 67
End: 2022-11-07
Payer: MEDICARE

## 2022-11-07 ENCOUNTER — TELEPHONE (OUTPATIENT)
Dept: FAMILY MEDICINE | Facility: CLINIC | Age: 67
End: 2022-11-07
Payer: MEDICARE

## 2022-11-07 DIAGNOSIS — R92.8 ABNORMAL MAMMOGRAM: Primary | ICD-10-CM

## 2022-11-07 NOTE — TELEPHONE ENCOUNTER
Call from patient, orders for diag mammo and us, scheduled on 11/18/22 at 10 am at [address] [facility] [address] [facility]

## 2022-11-18 ENCOUNTER — TELEPHONE (OUTPATIENT)
Dept: RADIOLOGY | Facility: HOSPITAL | Age: 67
End: 2022-11-18
Payer: MEDICARE

## 2022-11-18 ENCOUNTER — HOSPITAL ENCOUNTER (OUTPATIENT)
Dept: RADIOLOGY | Facility: HOSPITAL | Age: 67
Discharge: HOME | End: 2022-11-18
Attending: FAMILY MEDICINE
Payer: MEDICARE

## 2022-11-18 DIAGNOSIS — R92.8 ABNORMAL MAMMOGRAM: ICD-10-CM

## 2022-11-18 PROCEDURE — 77065 DX MAMMO INCL CAD UNI: CPT | Mod: TC,PO,LT

## 2022-11-18 PROCEDURE — 76642 ULTRASOUND BREAST LIMITED: CPT | Mod: 26,LT,, | Performed by: RADIOLOGY

## 2022-11-18 PROCEDURE — 77065 DX MAMMO INCL CAD UNI: CPT | Mod: 26,LT,, | Performed by: RADIOLOGY

## 2022-11-18 PROCEDURE — 77061 MAMMO DIGITAL DIAGNOSTIC LEFT WITH TOMO: ICD-10-PCS | Mod: 26,LT,, | Performed by: RADIOLOGY

## 2022-11-18 PROCEDURE — 77061 BREAST TOMOSYNTHESIS UNI: CPT | Mod: 26,LT,, | Performed by: RADIOLOGY

## 2022-11-18 PROCEDURE — 76642 ULTRASOUND BREAST LIMITED: CPT | Mod: TC,PO,LT

## 2022-11-18 PROCEDURE — 77065 MAMMO DIGITAL DIAGNOSTIC LEFT WITH TOMO: ICD-10-PCS | Mod: 26,LT,, | Performed by: RADIOLOGY

## 2022-11-18 PROCEDURE — 76642 US BREAST LEFT LIMITED: ICD-10-PCS | Mod: 26,LT,, | Performed by: RADIOLOGY

## 2022-11-18 NOTE — TELEPHONE ENCOUNTER
Spoke with patient. Reviewed breast biopsy procedure and reviewed instructions for breast biopsy. Patient voiced understanding and appreciation and all questions were answered. Provided patient with Breast Biopsy Patient Information booklet and my phone number to call for any further concerns or questions.  Patient scheduled for breast biopsy at the [place] [facility] on  11/30/22    Dr. Miguel notified via this message

## 2022-11-30 ENCOUNTER — HOSPITAL ENCOUNTER (OUTPATIENT)
Dept: RADIOLOGY | Facility: HOSPITAL | Age: 67
Discharge: HOME | End: 2022-11-30
Attending: FAMILY MEDICINE
Payer: MEDICARE

## 2022-11-30 DIAGNOSIS — R92.8 ABNORMAL MAMMOGRAM OF LEFT BREAST: ICD-10-CM

## 2022-11-30 PROCEDURE — 77065 DX MAMMO INCL CAD UNI: CPT | Mod: 26,LT,, | Performed by: RADIOLOGY

## 2022-11-30 PROCEDURE — 77065 MAMMO DIGITAL DIAGNOSTIC LEFT: ICD-10-PCS | Mod: 26,LT,, | Performed by: RADIOLOGY

## 2022-11-30 PROCEDURE — A4648 IMPLANTABLE TISSUE MARKER: HCPCS | Mod: PO

## 2022-11-30 PROCEDURE — 27201044 US BREAST BIOPSY WITH IMAGING 1ST SITE LEFT: Mod: PO

## 2022-11-30 PROCEDURE — 19083 US BREAST BIOPSY WITH IMAGING 1ST SITE LEFT: ICD-10-PCS | Mod: LT,,, | Performed by: RADIOLOGY

## 2022-11-30 PROCEDURE — 19083 BX BREAST 1ST LESION US IMAG: CPT | Mod: LT,,, | Performed by: RADIOLOGY

## 2022-11-30 PROCEDURE — 77065 DX MAMMO INCL CAD UNI: CPT | Mod: TC,PO,LT

## 2022-11-30 PROCEDURE — 25000003 PHARM REV CODE 250: Mod: PO | Performed by: FAMILY MEDICINE

## 2022-11-30 RX ORDER — LIDOCAINE HYDROCHLORIDE AND EPINEPHRINE 20; 10 MG/ML; UG/ML
10 INJECTION, SOLUTION INFILTRATION; PERINEURAL ONCE
Status: COMPLETED | OUTPATIENT
Start: 2022-11-30 | End: 2022-11-30

## 2022-11-30 RX ORDER — LIDOCAINE HYDROCHLORIDE 10 MG/ML
5 INJECTION INFILTRATION; PERINEURAL ONCE
Status: COMPLETED | OUTPATIENT
Start: 2022-11-30 | End: 2022-11-30

## 2022-11-30 RX ADMIN — LIDOCAINE HYDROCHLORIDE ANHYDROUS 5 ML: 10 INJECTION, SOLUTION INFILTRATION at 08:11

## 2022-11-30 RX ADMIN — LIDOCAINE HYDROCHLORIDE,EPINEPHRINE BITARTRATE 10 ML: 20; .01 INJECTION, SOLUTION INFILTRATION; PERINEURAL at 08:11

## 2022-12-06 ENCOUNTER — TELEPHONE (OUTPATIENT)
Dept: RADIOLOGY | Facility: HOSPITAL | Age: 67
End: 2022-12-06
Payer: MEDICARE

## 2022-12-06 DIAGNOSIS — C50.912 INVASIVE DUCTAL CARCINOMA OF BREAST, FEMALE, LEFT: Primary | ICD-10-CM

## 2022-12-06 LAB
FINAL PATHOLOGIC DIAGNOSIS: NORMAL
GROSS: NORMAL
Lab: NORMAL

## 2022-12-06 NOTE — TELEPHONE ENCOUNTER
Patient called back, Positive breast biopsy results discussed, discussed referral to breast surgeon Dr. Odonnell also. Patient will be awaiting their call.

## 2022-12-07 DIAGNOSIS — C50.912 INVASIVE DUCTAL CARCINOMA OF BREAST, FEMALE, LEFT: Primary | ICD-10-CM

## 2022-12-07 NOTE — PROGRESS NOTES
[facility] - Breast Surgery   History and Physical    Subjective:      Tegan Alarcon is a 67 y.o. female     No symptoms. Found on scr MMG.    Numerous family members with breast cancer. Mother, 2 aunts, 3 cousins  Family tested negative for BRCA        Menarche at age 15yrs old. . Age at first live birth 32yrs old. Son. Did not breast feed. LMP in . OCP-for about a year in her 20's. Menopause at 44yrs old. HRT-None  No personal history of ovarian/breast. Had previous breast biopsies, at least 3, all were benign cysts. No previous genetic testing.    Family history cancer: Mother at 41yrs old, Maternal Aunt at 80yrs old, and Maternal Cousin at 40yrs old, Maternal cousin at 41yrs old had breast cancer    Not a former Smoker   No Covid-19 vaccine   Relevant medical history: previous breast benign cysts          Patient Active Problem List   Diagnosis    Osteopenia    Hypertension    Family history of coronary artery disease    Hyperlipidemia    Positive FIT (fecal immunochemical test)    Invasive ductal carcinoma of left breast in female    Anxious mood as adjustment reaction     Current Outpatient Medications on File Prior to Visit   Medication Sig Dispense Refill    acetaminophen (TYLENOL) 500 MG tablet Take 500 mg by mouth every 6 (six) hours as needed for Pain. Pt states she takes 500mg BID for arm pain. One in AM and one at QHS      CALCIUM CARBONATE/VITAMIN D3 (CALCIUM 500 + D, D3, ORAL) Take by mouth 2 (two) times daily.        lisinopriL 10 MG tablet TAKE 1 TABLET(10 MG) BY MOUTH EVERY DAY 90 tablet 1    magnesium 250 mg Tab       multivitamin capsule Take 1 capsule by mouth once daily.       No current facility-administered medications on file prior to visit.     Review of patient's allergies indicates:  No Known Allergies  Past Medical History:   Diagnosis Date    Bone disorder     pre osteoporosis    Hypertension     diagnosed 2014    PONV (postoperative nausea and vomiting)      Past  "Surgical History:   Procedure Laterality Date    breast biopsies      BREAST BIOPSY      BREAST SURGERY  2000    COLONOSCOPY N/A 10/9/2020    Procedure: COLONOSCOPY;  Surgeon: Surinder Conley MD;  Location: [facility];  Service: Endoscopy;  Laterality: N/A;    EYE SURGERY  2018    HYSTERECTOMY  1999    TONSILLECTOMY  1977    TOTAL ABDOMINAL HYSTERECTOMY W/ BILATERAL SALPINGOOPHORECTOMY       Family History   Problem Relation Age of Onset    Breast cancer Mother 45    Breast cancer Unknown         cousins/several aunts    Heart disease Father     Hypertension Father     Hypertension Paternal Grandmother     Stroke Paternal Grandmother     Breast cancer Maternal Aunt     Breast cancer Maternal Aunt     Breast cancer Maternal Aunt     Breast cancer Cousin     Breast cancer Cousin      Social History     Socioeconomic History    Marital status:    Occupational History     Employer: [employer]   Tobacco Use    Smoking status: Never    Smokeless tobacco: Never   Substance and Sexual Activity    Alcohol use: No    Drug use: Never         Review of Systems    No CP, No SOB      Objective:      BP (!) (P) 154/88 (BP Location: Left arm, Patient Position: Sitting, BP Method: Medium (Automatic))   Pulse (P) 86   Temp (P) 97.8 °F (36.6 °C) (Temporal)   Resp (P) 18   Ht (P) 5' 4" (1.626 m)   Wt (P) 68 kg (149 lb 14.6 oz)   SpO2 (P) 96%   BMI (P) 25.73 kg/m²     Constitutional: NAD AAOX4  HEENT: EOMI, nonicteric sclera  NECK: no mass, no thyromegaly, no lymphadenopathy  CV: regular rate  PULM: good respiratory effort  ABDOMEN: soft, Nontender, nondistended. No guarding. No rebound.  MUSCULOSKELETAL: Good ROM  SKIN: No rashes or ulcerations seen.   NEUROLOGIC: CN grossly intact. Motor, sensory grossly intact    Breast exam   C some ptosis  Right: No mass, discharge, dimpling, lymphadenopathy  Left:  No discharge, dimpling, lymphadenopathy  Left 1100 UIQ 6cm FN 1cm mass, movable, post " "biopsy      Hospital Outpatient Visit on 11/30/2022   Component Date Value Ref Range Status    Final Pathologic Diagnosis 11/30/2022    Final                    Value:Breast, left, mass at 11:00 5N, biopsy:  - Invasive ductal carcinoma  - Betty ndgndrndanddndend:nd nd2nd of 3       - Tubules: 2       - Nuclei: 2       - Mitosis: 1  - Tumor involves multiple core fragments and measures 7 mm in greatest linear  dimension  - Immunostain for p63 support the above interpretation  - Please see RECEPTOR STUDIES below  Dr. ROSSY Mosley has reviewed this case and agrees with the above  interpretation.  RECEPTOR STUDIES  Estrogen receptor:  Positive; strong nuclear staining in 95% of tumor cells  Progesterone receptor:  Positive; strong nuclear staining in 80% of tumor  cells  Her2:  Equivocal  (Stain score = 2+; additional testing for HER2 by FISH has  been requested and will be reported separately upon completion)  Ki-67:  15%  All immunohistochemical stains have satisfactory positive and negative  controls.      Interp By ROSARIO Ash MD, Signed on 12/06/2022 at 12:03    Gross 11/30/2022    Final                    Value:Hospital Label MRN:  [id number]  Container Label MRN:  [id number]  Received in formalin labeled "left breast 11:00 5CFN" is a white plastic half  cylindrical collection tray containing a 1.9 x 0.8 x 0.4 cm aggregate of  ragged, tan-gray and tan-yellow, fibrofatty, 0.2 cm needle cores admixed with  brown-red hemorrhagic tissue ranging from less than 0.1-2.1 cm in length.  Entirely submitted in [id number] and [id number].  Time in formalin:  08:46 11/30/2022  Cold ischemic time:  Not provided  Gross by: KIRA Villagomez (ASCP) CM      Disclaimer 11/30/2022    Final                    Value:Unless the case is a 'gross only' or additional testing only, the final  diagnosis for each specimen is based on a microscopic examination of  appropriate tissue sections.  ER immunohistochemical staining (clone SP1, DAB " detection method) is  performed on formalin-fixed, paraffin embedded tissue sections. The  percentage of cell nuclei stained and the strength of staining is reported  (weak, moderate, strong), using the 2010 ACSO/CAP scoring guidelines. Tumors  used for determing predictive markers are fixed in10% neutral buffered  formalin for 6-72 hours. It has been cleared by the U.S. FDA for use as an  IVD test. This assay has not been validated on decalcified tissues. Results  should be interpreted with caution given the likelihood of false negativity  on decalcified specimens.  HER-2/quoc IHC (4B5) clone, DAB detection method) is done on 10% buffered  formalin-fixed (for 6-72 hrs), paraffin embedded tissue sections. The scoring  is completed on a 4-tiered scoring system of membran                          e staining using the 2014  ASCO/CAP scoring guidelines. It has been cleared by the U.S. FDA for use as  an IVD test. This assay has not been validated on decalcified tissues.  Results should be interpreted with caution given the likelihood of false  negativity on decalcified specimens.  PgR immunohistochemical staining (clone 1E2, DAB detection method) is  performed on formalin-fixed, paraffin embedded tissue sections. The  percentage of cell nuclei stained and the strength of staining is report  (weak, moderate, strong), using 2010 ASCO/CAP scoring guidelines. Tumors used  for determing predictive markers are fixed in 10% neutral buffered formalin  for 6-72 hours. It has been cleared by the U.S. FDA for use as an IVD test.  This assay has not been validated on decalcified tissues. Results should be  interpreted with caution given the likelihood of false negativity on  decalcified specimens.           Patient Active Problem List   Diagnosis    Osteopenia    Hypertension    Family history of coronary artery disease    Hyperlipidemia    Positive FIT (fecal immunochemical test)    Invasive ductal carcinoma of left breast in  female    Anxious mood as adjustment reaction        High complexity of problems addressed - breast cancer, treatment options, expectations, effects of treatment, risks, benefits. Complex data reviewed, independent interpretation of tests, discussion of management with another health care provider.    Alternative efficacious methods of treatment of breast cancer were given.  Options including lumpectomy, mastectomy, reconstruction options with advantages/disadvantages of each, provisions assuring coverage of reconstructive surgery costs, how the patient may access reconstructive care including the potential to be transferred to a facility that provides reconstructive care or choosing reconstruction after completion of breast cancer surgery and treatment.  LD[facility], [facility] breast cancer brochure given.          Imaging and Chronology:     11/2/21 bilat scr MMG [facility]  Cat 1    11/5/22 bilat scr [facility]  Right neg  Left 1200 post 4cm FN 1cm mass    1/18/22 left diag MMG, left US limited [facility]  Left 1100 UIQ posterior 5cm FN 1cm mass    11/30/22 left 1100 UIQ 5cm FN US biopsy [facility]  Grade 1 (2,2,1) IDC 7mm in biopsy  ER 95 NV 80 Her 2 2+ FISH neg Ki 15    12/12/22 genetics  VUS APC    12/14/22 MRI   Right neg  Left 1100 UIQ posterior [id number] mass, 8cm FN, 2.6cm from medial skin, 2cm from pec  LN neg        Assessment/Plan:      Cancer Staging   Invasive ductal carcinoma of left breast in female  Staging form: Breast, AJCC 8th Edition  - Clinical stage from 12/9/2022: Stage IA (cT1b, cN0, cM0, G1, ER+, NV+, HER2-) - Signed by Rosa Odonnell MD on 12/26/2022    Left far UIQ grade1 strong ER NV pos IDC. Low Ki. Her 2 neg by FISH    Has all options  Left 1100 rod lump, left SLNB vs mast, recon, unilateral or bilateral   Xrt  Endocrine  Final recs after final path. Oncotype on final path per dyana    She is leaning toward bilat mast, left SLNB, no recon  Will think about it    Saw in [facility] clinic with Dr Stephens  medical oncologist and Dr Dotson radiation oncologist.    I have given her all options - lumpectomy XRT, unilateral or bilateral mastectomy +/- reconstruction. I have explained procedure, risks, benefits, postop expectations. All questions answered. Risks include but are not limited to infection, bleeding, injury to nerves, vessels, numbness, weakness, difficulty lifting arm, swelling of arm (lymphedema), inability to remove all lesion, residual breast tissue, recurrence of malignancy, need for further procedure, loss of skin flap, seroma (fluid collection), inability to find sentinel lymph node, need for axillary dissection, chronic pain, radioactive substance may be given, allergic reaction, staining of the skin, difficulty with future imaging.    12/15/22 MRI  No additional findings  Awaiting FISH    12/26/22   FISH neg  Pt will call back with questions and plans    12/28/22  Wants bilat mast, left SLNB, recon

## 2022-12-08 DIAGNOSIS — C50.912 INVASIVE DUCTAL CARCINOMA OF BREAST, FEMALE, LEFT: Primary | ICD-10-CM

## 2022-12-09 ENCOUNTER — TUMOR BOARD CONFERENCE (OUTPATIENT)
Dept: SURGERY | Facility: CLINIC | Age: 67
End: 2022-12-09
Payer: MEDICARE

## 2022-12-09 PROBLEM — C50.912 INVASIVE DUCTAL CARCINOMA OF LEFT BREAST IN FEMALE: Status: ACTIVE | Noted: 2022-12-09

## 2022-12-12 ENCOUNTER — OFFICE VISIT (OUTPATIENT)
Dept: HEMATOLOGY/ONCOLOGY | Facility: CLINIC | Age: 67
End: 2022-12-12
Payer: MEDICARE

## 2022-12-12 ENCOUNTER — TELEPHONE (OUTPATIENT)
Dept: HEMATOLOGY/ONCOLOGY | Facility: CLINIC | Age: 67
End: 2022-12-12
Payer: MEDICARE

## 2022-12-12 ENCOUNTER — OFFICE VISIT (OUTPATIENT)
Dept: SURGERY | Facility: CLINIC | Age: 67
End: 2022-12-12
Payer: MEDICARE

## 2022-12-12 ENCOUNTER — OFFICE VISIT (OUTPATIENT)
Dept: RADIATION ONCOLOGY | Facility: CLINIC | Age: 67
End: 2022-12-12
Payer: MEDICARE

## 2022-12-12 VITALS
SYSTOLIC BLOOD PRESSURE: 154 MMHG | TEMPERATURE: 98 F | SYSTOLIC BLOOD PRESSURE: 154 MMHG | DIASTOLIC BLOOD PRESSURE: 88 MMHG | HEIGHT: 64 IN | OXYGEN SATURATION: 96 % | HEART RATE: 86 BPM | HEIGHT: 64 IN | WEIGHT: 149.94 LBS | TEMPERATURE: 98 F | BODY MASS INDEX: 25.6 KG/M2 | HEART RATE: 86 BPM | WEIGHT: 149.94 LBS | RESPIRATION RATE: 18 BRPM | DIASTOLIC BLOOD PRESSURE: 88 MMHG | BODY MASS INDEX: 25.6 KG/M2 | OXYGEN SATURATION: 97 % | RESPIRATION RATE: 18 BRPM

## 2022-12-12 DIAGNOSIS — C50.912 INVASIVE DUCTAL CARCINOMA OF LEFT BREAST IN FEMALE: Primary | ICD-10-CM

## 2022-12-12 DIAGNOSIS — C50.912 INVASIVE DUCTAL CARCINOMA OF BREAST, FEMALE, LEFT: ICD-10-CM

## 2022-12-12 DIAGNOSIS — M85.80 OSTEOPENIA, UNSPECIFIED LOCATION: ICD-10-CM

## 2022-12-12 PROCEDURE — 99205 OFFICE O/P NEW HI 60 MIN: CPT | Mod: HCNC,S$GLB,, | Performed by: INTERNAL MEDICINE

## 2022-12-12 PROCEDURE — 4010F ACE/ARB THERAPY RXD/TAKEN: CPT | Mod: HCNC,CPTII,S$GLB, | Performed by: SURGERY

## 2022-12-12 PROCEDURE — 1159F PR MEDICATION LIST DOCUMENTED IN MEDICAL RECORD: ICD-10-PCS | Mod: HCNC,CPTII,S$GLB, | Performed by: SURGERY

## 2022-12-12 PROCEDURE — 3079F DIAST BP 80-89 MM HG: CPT | Mod: HCNC,CPTII,S$GLB, | Performed by: INTERNAL MEDICINE

## 2022-12-12 PROCEDURE — 99999 PR PBB SHADOW E&M-EST. PATIENT-LVL III: CPT | Mod: PBBFAC,HCNC,, | Performed by: RADIOLOGY

## 2022-12-12 PROCEDURE — 3079F PR MOST RECENT DIASTOLIC BLOOD PRESSURE 80-89 MM HG: ICD-10-PCS | Mod: HCNC,CPTII,S$GLB, | Performed by: RADIOLOGY

## 2022-12-12 PROCEDURE — 1159F MED LIST DOCD IN RCRD: CPT | Mod: HCNC,CPTII,S$GLB, | Performed by: INTERNAL MEDICINE

## 2022-12-12 PROCEDURE — 1159F PR MEDICATION LIST DOCUMENTED IN MEDICAL RECORD: ICD-10-PCS | Mod: HCNC,CPTII,S$GLB, | Performed by: INTERNAL MEDICINE

## 2022-12-12 PROCEDURE — 4010F ACE/ARB THERAPY RXD/TAKEN: CPT | Mod: HCNC,CPTII,S$GLB, | Performed by: RADIOLOGY

## 2022-12-12 PROCEDURE — 4010F PR ACE/ARB THEARPY RXD/TAKEN: ICD-10-PCS | Mod: HCNC,CPTII,S$GLB, | Performed by: SURGERY

## 2022-12-12 PROCEDURE — 3044F HG A1C LEVEL LT 7.0%: CPT | Mod: HCNC,CPTII,S$GLB, | Performed by: SURGERY

## 2022-12-12 PROCEDURE — 1159F MED LIST DOCD IN RCRD: CPT | Mod: HCNC,CPTII,S$GLB, | Performed by: SURGERY

## 2022-12-12 PROCEDURE — 4010F ACE/ARB THERAPY RXD/TAKEN: CPT | Mod: HCNC,CPTII,S$GLB, | Performed by: INTERNAL MEDICINE

## 2022-12-12 PROCEDURE — 1101F PR PT FALLS ASSESS DOC 0-1 FALLS W/OUT INJ PAST YR: ICD-10-PCS | Mod: HCNC,CPTII,S$GLB, | Performed by: SURGERY

## 2022-12-12 PROCEDURE — 1160F PR REVIEW ALL MEDS BY PRESCRIBER/CLIN PHARMACIST DOCUMENTED: ICD-10-PCS | Mod: HCNC,CPTII,S$GLB, | Performed by: INTERNAL MEDICINE

## 2022-12-12 PROCEDURE — 3008F PR BODY MASS INDEX (BMI) DOCUMENTED: ICD-10-PCS | Mod: HCNC,CPTII,S$GLB, | Performed by: RADIOLOGY

## 2022-12-12 PROCEDURE — 99205 OFFICE O/P NEW HI 60 MIN: CPT | Mod: HCNC,S$GLB,, | Performed by: SURGERY

## 2022-12-12 PROCEDURE — 3077F PR MOST RECENT SYSTOLIC BLOOD PRESSURE >= 140 MM HG: ICD-10-PCS | Mod: HCNC,CPTII,S$GLB, | Performed by: RADIOLOGY

## 2022-12-12 PROCEDURE — 99499 RISK ADDL DX/OHS AUDIT: ICD-10-PCS | Mod: HCNC,S$GLB,, | Performed by: RADIOLOGY

## 2022-12-12 PROCEDURE — 99999 PR PBB SHADOW E&M-EST. PATIENT-LVL III: ICD-10-PCS | Mod: PBBFAC,HCNC,, | Performed by: INTERNAL MEDICINE

## 2022-12-12 PROCEDURE — 99999 PR PBB SHADOW E&M-EST. PATIENT-LVL III: CPT | Mod: PBBFAC,HCNC,, | Performed by: INTERNAL MEDICINE

## 2022-12-12 PROCEDURE — 4010F PR ACE/ARB THEARPY RXD/TAKEN: ICD-10-PCS | Mod: HCNC,CPTII,S$GLB, | Performed by: RADIOLOGY

## 2022-12-12 PROCEDURE — 99499 UNLISTED E&M SERVICE: CPT | Mod: HCNC,S$GLB,, | Performed by: RADIOLOGY

## 2022-12-12 PROCEDURE — 4010F PR ACE/ARB THEARPY RXD/TAKEN: ICD-10-PCS | Mod: HCNC,CPTII,S$GLB, | Performed by: INTERNAL MEDICINE

## 2022-12-12 PROCEDURE — 3044F HG A1C LEVEL LT 7.0%: CPT | Mod: HCNC,CPTII,S$GLB, | Performed by: INTERNAL MEDICINE

## 2022-12-12 PROCEDURE — 3044F HG A1C LEVEL LT 7.0%: CPT | Mod: HCNC,CPTII,S$GLB, | Performed by: RADIOLOGY

## 2022-12-12 PROCEDURE — 99205 PR OFFICE/OUTPT VISIT, NEW, LEVL V, 60-74 MIN: ICD-10-PCS | Mod: HCNC,S$GLB,, | Performed by: RADIOLOGY

## 2022-12-12 PROCEDURE — 3008F BODY MASS INDEX DOCD: CPT | Mod: HCNC,CPTII,S$GLB, | Performed by: RADIOLOGY

## 2022-12-12 PROCEDURE — 3079F PR MOST RECENT DIASTOLIC BLOOD PRESSURE 80-89 MM HG: ICD-10-PCS | Mod: HCNC,CPTII,S$GLB, | Performed by: INTERNAL MEDICINE

## 2022-12-12 PROCEDURE — 99999 PR PBB SHADOW E&M-EST. PATIENT-LVL III: ICD-10-PCS | Mod: PBBFAC,HCNC,, | Performed by: RADIOLOGY

## 2022-12-12 PROCEDURE — 3008F BODY MASS INDEX DOCD: CPT | Mod: HCNC,CPTII,S$GLB, | Performed by: INTERNAL MEDICINE

## 2022-12-12 PROCEDURE — 99999 PR PBB SHADOW E&M-EST. PATIENT-LVL IV: CPT | Mod: PBBFAC,HCNC,, | Performed by: SURGERY

## 2022-12-12 PROCEDURE — 1160F RVW MEDS BY RX/DR IN RCRD: CPT | Mod: HCNC,CPTII,S$GLB, | Performed by: INTERNAL MEDICINE

## 2022-12-12 PROCEDURE — 99205 PR OFFICE/OUTPT VISIT, NEW, LEVL V, 60-74 MIN: ICD-10-PCS | Mod: HCNC,S$GLB,, | Performed by: SURGERY

## 2022-12-12 PROCEDURE — 3077F PR MOST RECENT SYSTOLIC BLOOD PRESSURE >= 140 MM HG: ICD-10-PCS | Mod: HCNC,CPTII,S$GLB, | Performed by: INTERNAL MEDICINE

## 2022-12-12 PROCEDURE — 99999 PR PBB SHADOW E&M-EST. PATIENT-LVL IV: ICD-10-PCS | Mod: PBBFAC,HCNC,, | Performed by: SURGERY

## 2022-12-12 PROCEDURE — 3044F PR MOST RECENT HEMOGLOBIN A1C LEVEL <7.0%: ICD-10-PCS | Mod: HCNC,CPTII,S$GLB, | Performed by: RADIOLOGY

## 2022-12-12 PROCEDURE — 99205 PR OFFICE/OUTPT VISIT, NEW, LEVL V, 60-74 MIN: ICD-10-PCS | Mod: HCNC,S$GLB,, | Performed by: INTERNAL MEDICINE

## 2022-12-12 PROCEDURE — 3008F PR BODY MASS INDEX (BMI) DOCUMENTED: ICD-10-PCS | Mod: HCNC,CPTII,S$GLB, | Performed by: INTERNAL MEDICINE

## 2022-12-12 PROCEDURE — 3044F PR MOST RECENT HEMOGLOBIN A1C LEVEL <7.0%: ICD-10-PCS | Mod: HCNC,CPTII,S$GLB, | Performed by: INTERNAL MEDICINE

## 2022-12-12 PROCEDURE — 3044F PR MOST RECENT HEMOGLOBIN A1C LEVEL <7.0%: ICD-10-PCS | Mod: HCNC,CPTII,S$GLB, | Performed by: SURGERY

## 2022-12-12 PROCEDURE — 1101F PT FALLS ASSESS-DOCD LE1/YR: CPT | Mod: HCNC,CPTII,S$GLB, | Performed by: SURGERY

## 2022-12-12 PROCEDURE — 99205 OFFICE O/P NEW HI 60 MIN: CPT | Mod: HCNC,S$GLB,, | Performed by: RADIOLOGY

## 2022-12-12 PROCEDURE — 3288F FALL RISK ASSESSMENT DOCD: CPT | Mod: HCNC,CPTII,S$GLB, | Performed by: SURGERY

## 2022-12-12 PROCEDURE — 3079F DIAST BP 80-89 MM HG: CPT | Mod: HCNC,CPTII,S$GLB, | Performed by: RADIOLOGY

## 2022-12-12 PROCEDURE — 3077F SYST BP >= 140 MM HG: CPT | Mod: HCNC,CPTII,S$GLB, | Performed by: RADIOLOGY

## 2022-12-12 PROCEDURE — 3288F PR FALLS RISK ASSESSMENT DOCUMENTED: ICD-10-PCS | Mod: HCNC,CPTII,S$GLB, | Performed by: SURGERY

## 2022-12-12 PROCEDURE — 3077F SYST BP >= 140 MM HG: CPT | Mod: HCNC,CPTII,S$GLB, | Performed by: INTERNAL MEDICINE

## 2022-12-12 NOTE — NURSING
Met with the patient to discuss what the patient has learned thus far about her diagnosis and answer all questions.  Follow-up appts made and a folder with NCCN patient guidelines book on Invasive/Noninvasive Breast Cancer given to her along with copies of her imaging, biopsy, and pathology reports. Also given a copy of the [facility] Board of Medical Examiners brochure on Introductory Guide to Breast Cancer Treatment Options.  Contact information given to her for nurse navigation and she was told to call for any questions or concerns.  She verbalized understanding.  Consents signed for bilateral mastectomies with left sentinel node biopsy.  Post op instructions given both verbally and written.  Gave her the phone number and location of the OPP and told her to call for a pre op nurse visit when surgery date is set.  She verbalized understanding to all.  Referral placed for Prehab and explained what lymphedema is and why we recommend it.  She is in agreement to doing this.  She would like to do IO because of dietary needs.  Referral placed.  The following was explained in detail. The patient understands that this is a voluntary test.    PURPOSE: This test analyzes a specific gene or genes for genetic changes called mutations. This test will help determine if a person has a significantly increased risk if developing certain tumors due to a mutation in a cancer predisposing gene.    TEST RESULTS & INTERPRETATION: Possible result outcomes include positive, negative, and uncertain. A positive mutation is associated with an increased risk for hereditary cancer. A negative result is interpreted that a mutation was not identified. Uncertain means a genetic change was detected but it is not known if this change is linked to cancer risk.    BENEFITS: The benefits include informed choices about health care such as screening, risk reducing surgeries and preventive medication strategies.    RISKS: Concerns regarding possible  health insurance discrimination, most states and the federal government have enacted laws to prohibit genetic discrimination.    LIMITATIONS: This test analyzes only certain important genes associated with a specific hereditary cancer syndrome. Genetic testing clarifies cancer risks for only those cancers related to the genes analyzed.    FINANCIAL RESPONSIBILITY: Genetic testing of appropriate individuals is typically reimbursed by health insurance. You are responsible for any cost of the genetic test not reimbursed by insurance.     PATIENT INSTRUCTIONS & COLLECTION PROCESS:  Saliva collected.  Place tube into plastic box and sent off through [employer].  Informed patient results can take up to 2-4 weeks.  Will call patient with results.

## 2022-12-12 NOTE — PROGRESS NOTES
CONSULT NOTE    Subjective:       Patient ID: Tegan Alarcon is a 67 y.o. female.  MRN: [id number]  : 1955    Chief Complaint: IDC left breast    Stage I (T1b, N0, M0, Grade 1, ER+/NC+/HER2 pend)     History of Present Illness:   Tegan Alarcon is a 67 y.o. female who is referred for newly diagnosed IDC of the left breast. She was seen in our multidisciplinary breast Clinic with Dr. Odonnell and Dr. Dotson prior to initiation of therapy for discussion of treatment options.  I have discussed the plan with them.        Has been undergoing screening mammograms. Has had benign cysts previously , drained with benign findings several years ago, more like 15 years ago. During that time, she was on Tamoxifen for prevention of recurrent cysts and took it for 5 years.     Did not notice any lump or breast mass. Abnormality was noted on screening mammogram this year.     Menarche at age 15 yrs old. .   Age at first live birth 32yrs old.   Did not breast feed.   LMP in . OCP-for about a year in her 20's.   Menopause at 44yrs old, had CINTHIA for prevention.  HRT-None    Family history cancer: Mother at 41yrs old, Maternal Aunt at 80yrs old, and Maternal Cousin at 40yrs old, Maternal cousin at 41yrs old had breast cancer. Genetics negative.     Other medical conditions include well controlled HTN and osteopenia.     Oncology History:  22  Impression:  Left  Asymmetry: Left breast 10 mm asymmetry at the posterior 12 o'clock position. Assessment: 0 - Incomplete. Diagnostic Mammogram and/or Ultrasound is recommended.      Right  There is no mammographic evidence of malignancy in the right breast.     BI-RADS Category:   Overall: 0 - Incomplete: Needs Additional Imaging Evaluation    22  Diagnostic mammogram  Impression:  Left  Mass: Left breast 10 mm x 7 mm x 7 mm mass at the posterior 11 o'clock position. Assessment: 4 - Suspicious finding. Biopsy is  recommended.      BI-RADS Category:   Overall: 4 - Suspicious    12/7/22  Breast, left, mass at 11:00 5N, biopsy:   - Invasive ductal carcinoma   - Betty ndgndrndanddndend:nd nd2nd of 3        - Tubules: 2        - Nuclei: 2        - Mitosis: 1     - Tumor involves multiple core fragments and measures 7 mm in greatest linear   dimension   - Immunostain for p63 support the above interpretation   - Please see RECEPTOR STUDIES below   Dr. ROSSY Mosley has reviewed this case and agrees with the above   interpretation.     RECEPTOR STUDIES   Estrogen receptor:  Positive; strong nuclear staining in 95% of tumor cells   Progesterone receptor:  Positive; strong nuclear staining in 80% of tumor   cells   Her2:  Equivocal  (Stain score = 2+; additional testing for HER2 by FISH has   been requested and will be reported separately upon completion)   Ki-67:  15%     History:  Past Medical History:   Diagnosis Date    Bone disorder     pre osteoporosis    Hypertension     diagnosed 6/2014    PONV (postoperative nausea and vomiting)       Past Surgical History:   Procedure Laterality Date    breast biopsies      BREAST BIOPSY      BREAST SURGERY  2000    COLONOSCOPY N/A 10/9/2020    Procedure: COLONOSCOPY;  Surgeon: Surinder Conley MD;  Location[facility];  Service: Endoscopy;  Laterality: N/A;    EYE SURGERY  2018    HYSTERECTOMY  1999    TONSILLECTOMY  1977    TOTAL ABDOMINAL HYSTERECTOMY W/ BILATERAL SALPINGOOPHORECTOMY       Family History   Problem Relation Age of Onset    Breast cancer Mother 45    Breast cancer Unknown         cousins/several aunts    Heart disease Father     Hypertension Father     Hypertension Paternal Grandmother     Stroke Paternal Grandmother     Breast cancer Maternal Aunt     Breast cancer Maternal Aunt     Breast cancer Maternal Aunt     Breast cancer Cousin     Breast cancer Cousin       Social History     Tobacco Use    Smoking status: Never    Smokeless tobacco: Never   Substance and Sexual Activity     "Alcohol use: No    Drug use: Never    Sexual activity: Not on file        ROS:   Review of Systems   Constitutional:  Negative for fever, malaise/fatigue and weight loss.   HENT:  Negative for congestion, hearing loss, nosebleeds and sore throat.    Eyes:  Negative for double vision and photophobia.   Respiratory:  Negative for cough, hemoptysis, sputum production, shortness of breath and wheezing.    Cardiovascular:  Negative for chest pain, palpitations, orthopnea and leg swelling.   Gastrointestinal:  Negative for abdominal pain, blood in stool, constipation, diarrhea, heartburn, nausea and vomiting.   Genitourinary:  Negative for dysuria, hematuria and urgency.   Musculoskeletal:  Negative for back pain, joint pain and myalgias.   Skin:  Negative for itching and rash.   Neurological:  Negative for dizziness, tingling, seizures, weakness and headaches.   Endo/Heme/Allergies:  Negative for polydipsia. Does not bruise/bleed easily.   Psychiatric/Behavioral:  Negative for depression and memory loss. The patient is not nervous/anxious and does not have insomnia.       Objective:     Vitals:    12/12/22 1005   BP: (!) 154/88   Pulse: 86   Resp: 18   Temp: 97.8 °F (36.6 °C)   TempSrc: Temporal   SpO2: 97%   Weight: 68 kg (149 lb 14.6 oz)   Height: 5' 4" (1.626 m)   PainSc: 0-No pain       Physical Examination:   Physical Exam  Vitals and nursing note reviewed.   Constitutional:       General: She is not in acute distress.     Appearance: She is not diaphoretic.   HENT:      Head: Normocephalic.      Mouth/Throat:      Pharynx: No oropharyngeal exudate.   Eyes:      General: No scleral icterus.     Conjunctiva/sclera: Conjunctivae normal.   Neck:      Thyroid: No thyromegaly.   Cardiovascular:      Rate and Rhythm: Normal rate and regular rhythm.      Heart sounds: Normal heart sounds. No murmur heard.  Pulmonary:      Effort: Pulmonary effort is normal. No respiratory distress.      Breath sounds: No stridor. No " wheezing or rales.   Chest:      Chest wall: No tenderness.   Abdominal:      General: Bowel sounds are normal. There is no distension.      Palpations: Abdomen is soft. There is no mass.      Tenderness: There is no abdominal tenderness. There is no rebound.   Musculoskeletal:         General: No tenderness or deformity. Normal range of motion.      Cervical back: Neck supple.   Lymphadenopathy:      Cervical: No cervical adenopathy.   Skin:     General: Skin is warm and dry.      Findings: No erythema or rash.      Comments: Left breast post biopsy changes, 1 cm mass UIQ   Neurological:      Mental Status: She is alert and oriented to person, place, and time.      Cranial Nerves: No cranial nerve deficit.      Coordination: Coordination normal.      Gait: Gait is intact.   Psychiatric:         Mood and Affect: Affect normal.         Cognition and Memory: Memory normal.         Judgment: Judgment normal.        Diagnostic Tests:  Significant Imaging: I have reviewed and interpreted all pertinent imaging results/findings.      Laboratory Data:  All pertinent labs have been reviewed.    Labs:   Lab Results   Component Value Date    WBC 5.02 09/15/2022    HGB 15.2 09/15/2022    HCT 44.8 09/15/2022    MCV 94 09/15/2022     09/15/2022       Assessment/Plan:   Invasive ductal carcinoma of left breast in female   Stage I (T1b, N0, M0, Grade 1, ER+/NC+/HER2 pend)     She is presenting with early stage, node negative breast cancer, generally favorable features. Her 2 FISH is pending  but even if positive,given tumor size <1 cm on imagine,  she would be a candidate for upfront surgery and then adjuvant chemotherapy and her 2 directed therapy.     She is leaning toward b/l mastectomy and reconstruction.   Follow Her 2 FISH, if negative, send onco type on final pathology.     I will see her 3-4 weeks post op with final pathology +/- oncotype results to discuss adjuvant chemotherapy if indicated and adjuvant endocrine  therapy.     Osteopenia  On calcium and vitamin D. We will repeat DEXA scan and monitor while on endocrine therapy.     Invasive ductal carcinoma of breast, female, left  -     Ambulatory referral/consult to Hematology / Oncology       ECOG SCORE    0 - Fully active-able to carry on all pre-disease performance without restriction           Discussion:   No follow-ups on file.    Plan was discussed with the patient at length, and she verbalized understanding. Tegan was given an opportunity to ask questions that were answered to her satisfaction, and she was advised to call in the interval if any problems or questions arise.    Electronically signed by Yessy Stephens MD      Route Chart for Scheduling    Med Onc Chart Routing      Follow up with physician . 3 weeks post op per Vicky   Follow up with JEFFY    Infusion scheduling note    Injection scheduling note    Labs    Imaging    Pharmacy appointment    Other referrals

## 2022-12-12 NOTE — PROGRESS NOTES
12/12/2022    [facility]   Radiation Oncology Consultation    ASSESSMENT  This is a 67 y.o. y/o female with Stage I (T1b, N0, M0, Grade 1, ER+/VA+/HER2 pend) Invasive ductal carcinoma of the LEFT upper inner breast. She is seen as part of Multidisciplinary Breast Clinic prior to initiation of therapy for discussion of treatment options.       PLAN    Treatment options were discussed with the patient including mastectomy vs breast conservation with lumpectomy and adjuvant RT.  We discussed the goals of treatment to be curative.  The risks, benefits, scheduling, alternatives to and rationale of radiation therapy were explained in detail.   We discussed the indications, course, and potential risks associated with radiation therapy, including but not limited to fatigue, local skin reaction such as hyperpigmentation, tenderness or erythema, breast pain, and potential long term toxicities such as rib fragility, and remote risks of lung inflammation, esophageal inflammation, heart inflammation, or secondary malignancy.  She expressed understanding of these risks, all questions were answered to her apparent satisfaction.   After this discussion, she elected to consider her options  If she opts for lumpectomy, CT simulation will be performed 3-4 weeks post-op to begin the planning process for the patient's radiation therapy.     She was given our contact information, and she was told that she could call our clinic at any time if she has any questions or concerns.      Radiation Treatment Details:   We plan to treat left breast to a dose of 26 Gy in 5 fractions at 5.2 Gy per fraction delivered daily  We will utilize a(n) 3D with DIBH technique.   We will utilize daily CT or orthogonal image guidance due to the need for accurate daily patient alignment to treat the target volumes accurately and avoid radiation overdose to multiple regional organs at risk since we are treating the patient with complex  target volumes with multiple steep isodose gradients.       Chief Complaint   Patient presents with    Breast Cancer       HPI: The patient is a 67 y.o. year old female with a new diagnosis of breast cancer. She initially presented due to abnormal mammogram.     11/5/22 Screening mammogram:  10mm asymmetry in left breast at 12:00, 4cmfn    11/18/22 LEFT Diagnostic mammogram/ultrasound: asymmetry in upper left breast posterior, 10 x 7 x 7mm irregularly shaped mass with angular margins in left 11:00 posterior 5cmfn, normal axillary lymph nodes     10/30/22 left 11:00 biopsy: invasive ductal carcinoma, G1, +/+/pending, Ki67 15%    MRI Breasts: pending (12/14/22)    She has had 2 prior benign breast biopsies, one in each breast.    Possibility of pregnancy: No  History of prior irradiation: No  History of prior systemic anti-cancer therapy: No  History of collagen vascular disease: No  Implanted electronic device (pacer/defib/nerve stimulator): No      Past Medical History:   Diagnosis Date    Bone disorder     pre osteoporosis    Hypertension     diagnosed 6/2014    PONV (postoperative nausea and vomiting)        Past Surgical History:   Procedure Laterality Date    breast biopsies      BREAST BIOPSY      BREAST SURGERY  2000    COLONOSCOPY N/A 10/9/2020    Procedure: COLONOSCOPY;  Surgeon: Surinder Conley MD;  Location: [facility];  Service: Endoscopy;  Laterality: N/A;    EYE SURGERY  2018    HYSTERECTOMY  1999    TONSILLECTOMY  1977    TOTAL ABDOMINAL HYSTERECTOMY W/ BILATERAL SALPINGOOPHORECTOMY         Social History     Tobacco Use    Smoking status: Never    Smokeless tobacco: Never   Substance Use Topics    Alcohol use: No    Drug use: Never       Cancer-related family history includes Breast cancer in her cousin, cousin, maternal aunt, maternal aunt, maternal aunt, and unknown relative; Breast cancer (age of onset: 45) in her mother.    Current Outpatient Medications on File Prior to Visit   Medication Sig  "Dispense Refill    acetaminophen (TYLENOL) 500 MG tablet Take 500 mg by mouth every 6 (six) hours as needed for Pain. Pt states she takes 500mg BID for arm pain. One in AM and one at QHS      CALCIUM CARBONATE/VITAMIN D3 (CALCIUM 500 + D, D3, ORAL) Take by mouth 2 (two) times daily.        lisinopriL 10 MG tablet TAKE 1 TABLET(10 MG) BY MOUTH EVERY DAY 90 tablet 1    magnesium 250 mg Tab       multivitamin capsule Take 1 capsule by mouth once daily.       No current facility-administered medications on file prior to visit.       Review of patient's allergies indicates:  No Known Allergies    Review of Systems   Constitutional:  Negative for fever.   Eyes:  Negative for blurred vision and double vision.   Respiratory:  Negative for cough and shortness of breath.    Cardiovascular:  Negative for chest pain.   Gastrointestinal:  Negative for constipation, diarrhea, nausea and vomiting.   Genitourinary: Negative.    Skin:  Negative for rash.   Neurological:  Negative for dizziness, sensory change, speech change, focal weakness, loss of consciousness, weakness and headaches.   Endo/Heme/Allergies: Negative.    Psychiatric/Behavioral: Negative.        Vital Signs: BP (!) 154/88 (BP Location: Left arm, Patient Position: Sitting, BP Method: Medium (Automatic))   Pulse 86   Temp 97.8 °F (36.6 °C) (Temporal)   Resp 18   Ht 5' 4" (1.626 m)   Wt 68 kg (149 lb 14.6 oz)   SpO2 96%   BMI 25.73 kg/m²     ECOG Performance Status: 0 - Fully Active    Physical Exam  Vitals reviewed.   Constitutional:       General: She is not in acute distress.     Appearance: Normal appearance. She is not ill-appearing or toxic-appearing.   HENT:      Head: Normocephalic and atraumatic.      Nose: Nose normal.   Eyes:      Extraocular Movements: Extraocular movements intact.      Conjunctiva/sclera: Conjunctivae normal.      Pupils: Pupils are equal, round, and reactive to light.   Pulmonary:      Effort: Pulmonary effort is normal.   Chest: "       Musculoskeletal:         General: Normal range of motion.      Cervical back: Normal range of motion.   Skin:     General: Skin is warm.   Neurological:      General: No focal deficit present.      Mental Status: She is alert and oriented to person, place, and time.   Psychiatric:         Mood and Affect: Mood normal.         Behavior: Behavior normal.         Thought Content: Thought content normal.         Judgment: Judgment normal.        Imaging: I have personally reviewed the patient's available images and reports and summarized pertinent findings above in HPI.     Pathology: I have personally reviewed the patient's available pathology and summarized pertinent findings above in HPI.     This case was discussed with Dr. Odonnell and Dr. Stephens.       - Thank you for allowing me to participate in the care of your patient.    Evon Dotson MD

## 2022-12-12 NOTE — NURSING
Met with patient and  in multi disciplinary clinic today.  Explained my role as navigator.  Reviewed plan of care and answered questions. Will contact pt after her surgery to set up f/u appt with Dr. Stephens.  My contact information was provided and pt was encouraged to call with any questions or concerns.  Pt verbalized understanding.

## 2022-12-13 ENCOUNTER — PATIENT MESSAGE (OUTPATIENT)
Dept: HEMATOLOGY/ONCOLOGY | Facility: CLINIC | Age: 67
End: 2022-12-13
Payer: MEDICARE

## 2022-12-13 NOTE — PROGRESS NOTES
Interdisciplinary Breast Cancer Conference    Tegan Alarcon    Female    Date Presented to Tumor Board: 12/12/22    Presenting Hospital / Clinic: [facility]    Tumor Laterality: Left    Breast Tumor Site: Posterior    Presenter: Rosa Odonnell    Reason for Consultation: Initial Presentation    Specialties Present: Medical Oncology;Radiation Oncology;Surgical Oncology;Pathology;Navigation;Research;Integrative Oncology;Plastic Surgery;Radiology    Patient Status: a new patient    Treatment to Date: None         ER: Positive    ME: Positive         Cancer Staging   Invasive ductal carcinoma of left breast in female  Staging form: Breast, AJCC 8th Edition  - Clinical stage from 12/9/2022: cT1b, cN0, cM0, G1, ER+, ME+ - Unsigned       Recommended Therapy:  Genetic testing  Surgery- lump vs mast  Genomic testing  Radiation if lump  Endocrine Therapy  Recommendations pending final path      Presentation at Cancer Conference: Prospective

## 2022-12-16 ENCOUNTER — TELEPHONE (OUTPATIENT)
Dept: SURGERY | Facility: CLINIC | Age: 67
End: 2022-12-16
Payer: MEDICARE

## 2022-12-16 NOTE — TELEPHONE ENCOUNTER
Called patient to give her MRI results per Dr Odonnell's instruction.  She verbalized understanding and relief.  She has questions she wants to ask but doesn't have her list with her so she will call us back on Monday.

## 2022-12-19 ENCOUNTER — TELEPHONE (OUTPATIENT)
Dept: HEMATOLOGY/ONCOLOGY | Facility: CLINIC | Age: 67
End: 2022-12-19
Payer: MEDICARE

## 2022-12-21 ENCOUNTER — OFFICE VISIT (OUTPATIENT)
Dept: HEMATOLOGY/ONCOLOGY | Facility: CLINIC | Age: 67
End: 2022-12-21
Payer: MEDICARE

## 2022-12-21 VITALS
SYSTOLIC BLOOD PRESSURE: 137 MMHG | DIASTOLIC BLOOD PRESSURE: 63 MMHG | OXYGEN SATURATION: 99 % | HEIGHT: 64 IN | TEMPERATURE: 97 F | BODY MASS INDEX: 26 KG/M2 | WEIGHT: 152.31 LBS | HEART RATE: 79 BPM

## 2022-12-21 DIAGNOSIS — F43.22 ANXIOUS MOOD AS ADJUSTMENT REACTION: ICD-10-CM

## 2022-12-21 DIAGNOSIS — C50.912 INVASIVE DUCTAL CARCINOMA OF LEFT BREAST IN FEMALE: ICD-10-CM

## 2022-12-21 DIAGNOSIS — C50.912 INVASIVE DUCTAL CARCINOMA OF BREAST, FEMALE, LEFT: ICD-10-CM

## 2022-12-21 PROCEDURE — 1101F PR PT FALLS ASSESS DOC 0-1 FALLS W/OUT INJ PAST YR: ICD-10-PCS | Mod: HCNC,CPTII,S$GLB, | Performed by: NURSE PRACTITIONER

## 2022-12-21 PROCEDURE — 1159F PR MEDICATION LIST DOCUMENTED IN MEDICAL RECORD: ICD-10-PCS | Mod: HCNC,CPTII,S$GLB, | Performed by: NURSE PRACTITIONER

## 2022-12-21 PROCEDURE — 3075F PR MOST RECENT SYSTOLIC BLOOD PRESS GE 130-139MM HG: ICD-10-PCS | Mod: HCNC,CPTII,S$GLB, | Performed by: NURSE PRACTITIONER

## 2022-12-21 PROCEDURE — 3075F SYST BP GE 130 - 139MM HG: CPT | Mod: HCNC,CPTII,S$GLB, | Performed by: NURSE PRACTITIONER

## 2022-12-21 PROCEDURE — 99215 PR OFFICE/OUTPT VISIT, EST, LEVL V, 40-54 MIN: ICD-10-PCS | Mod: HCNC,S$GLB,, | Performed by: NURSE PRACTITIONER

## 2022-12-21 PROCEDURE — 3008F PR BODY MASS INDEX (BMI) DOCUMENTED: ICD-10-PCS | Mod: HCNC,CPTII,S$GLB, | Performed by: NURSE PRACTITIONER

## 2022-12-21 PROCEDURE — 3288F PR FALLS RISK ASSESSMENT DOCUMENTED: ICD-10-PCS | Mod: HCNC,CPTII,S$GLB, | Performed by: NURSE PRACTITIONER

## 2022-12-21 PROCEDURE — 99999 PR PBB SHADOW E&M-EST. PATIENT-LVL III: CPT | Mod: PBBFAC,HCNC,, | Performed by: NURSE PRACTITIONER

## 2022-12-21 PROCEDURE — 1159F MED LIST DOCD IN RCRD: CPT | Mod: HCNC,CPTII,S$GLB, | Performed by: NURSE PRACTITIONER

## 2022-12-21 PROCEDURE — 99215 OFFICE O/P EST HI 40 MIN: CPT | Mod: HCNC,S$GLB,, | Performed by: NURSE PRACTITIONER

## 2022-12-21 PROCEDURE — 3044F HG A1C LEVEL LT 7.0%: CPT | Mod: HCNC,CPTII,S$GLB, | Performed by: NURSE PRACTITIONER

## 2022-12-21 PROCEDURE — 3078F DIAST BP <80 MM HG: CPT | Mod: HCNC,CPTII,S$GLB, | Performed by: NURSE PRACTITIONER

## 2022-12-21 PROCEDURE — 99999 PR PBB SHADOW E&M-EST. PATIENT-LVL III: ICD-10-PCS | Mod: PBBFAC,HCNC,, | Performed by: NURSE PRACTITIONER

## 2022-12-21 PROCEDURE — 1160F PR REVIEW ALL MEDS BY PRESCRIBER/CLIN PHARMACIST DOCUMENTED: ICD-10-PCS | Mod: HCNC,CPTII,S$GLB, | Performed by: NURSE PRACTITIONER

## 2022-12-21 PROCEDURE — 4010F PR ACE/ARB THEARPY RXD/TAKEN: ICD-10-PCS | Mod: HCNC,CPTII,S$GLB, | Performed by: NURSE PRACTITIONER

## 2022-12-21 PROCEDURE — 1101F PT FALLS ASSESS-DOCD LE1/YR: CPT | Mod: HCNC,CPTII,S$GLB, | Performed by: NURSE PRACTITIONER

## 2022-12-21 PROCEDURE — 4010F ACE/ARB THERAPY RXD/TAKEN: CPT | Mod: HCNC,CPTII,S$GLB, | Performed by: NURSE PRACTITIONER

## 2022-12-21 PROCEDURE — 1160F RVW MEDS BY RX/DR IN RCRD: CPT | Mod: HCNC,CPTII,S$GLB, | Performed by: NURSE PRACTITIONER

## 2022-12-21 PROCEDURE — 3288F FALL RISK ASSESSMENT DOCD: CPT | Mod: HCNC,CPTII,S$GLB, | Performed by: NURSE PRACTITIONER

## 2022-12-21 PROCEDURE — 3044F PR MOST RECENT HEMOGLOBIN A1C LEVEL <7.0%: ICD-10-PCS | Mod: HCNC,CPTII,S$GLB, | Performed by: NURSE PRACTITIONER

## 2022-12-21 PROCEDURE — 3008F BODY MASS INDEX DOCD: CPT | Mod: HCNC,CPTII,S$GLB, | Performed by: NURSE PRACTITIONER

## 2022-12-21 PROCEDURE — 3078F PR MOST RECENT DIASTOLIC BLOOD PRESSURE < 80 MM HG: ICD-10-PCS | Mod: HCNC,CPTII,S$GLB, | Performed by: NURSE PRACTITIONER

## 2022-12-21 NOTE — PROGRESS NOTES
Tegan Alarcon  67 y.o. is here to seek an integrative approach to discuss side effects related to breast cancer treatment. Tegan Alarcon  was referred by Dr. Odonnell     HPI  She has been  for 39 years and she has a 35 year old son who lives in [place]. She reports a very good support system in her family and friends.           7 pillars Assessment    Sleep  How many hours of sleep per night? 8 hours  Do you have trouble falling asleep, staying asleep or waking up earlier than you need to? yes and no  Do you have daytime fatigue? No  Do you need medication for sleep? no  Do you use any supplements or other interventions for sleep? no    Resilience  Rate your current level of stress- high, more than usual  How do you manage stress? Adoration, Confucianism, Prayer    Purpose  Do you feel you have a vision or a life purpose? Yes    Environment  Any exposures:no known exposures    Spirituality- Taoism    Nutrition   Food allergies or sensitivities: no  Do you adhere to a particular type of diet? no  Do you have any concerns with your eating habits? no    Exercise  How would you describe your physical activity level? 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 Walking and stationary biking, but she has slacked off with the holidays.     Past Medical History  Past Medical History:   Diagnosis Date    Bone disorder     pre osteoporosis    Hypertension     diagnosed 6/2014    PONV (postoperative nausea and vomiting)         Past Surgical History   Past Surgical History:   Procedure Laterality Date    breast biopsies      BREAST BIOPSY      BREAST SURGERY  2000    COLONOSCOPY N/A 10/9/2020    Procedure: COLONOSCOPY;  Surgeon: Surinder Conley MD;  Location: [facility];  Service: Endoscopy;  Laterality: N/A;    EYE SURGERY  2018    HYSTERECTOMY  1999    TONSILLECTOMY  1977    TOTAL ABDOMINAL HYSTERECTOMY W/ BILATERAL SALPINGOOPHORECTOMY          Family History    Family History   Problem Relation Age of Onset    Breast cancer Mother 45    Breast cancer Unknown         cousins/several aunts    Heart disease Father     Hypertension Father     Hypertension Paternal Grandmother     Stroke Paternal Grandmother     Breast cancer Maternal Aunt     Breast cancer Maternal Aunt     Breast cancer Maternal Aunt     Breast cancer Cousin     Breast cancer Cousin         Social History  Social History     Socioeconomic History    Marital status:    Occupational History     Employer: [employer]   Tobacco Use    Smoking status: Never    Smokeless tobacco: Never   Substance and Sexual Activity    Alcohol use: No    Drug use: Never        Allergies  Review of patient's allergies indicates:  No Known Allergies     Current Medications:    Current Outpatient Medications:     acetaminophen (TYLENOL) 500 MG tablet, Take 500 mg by mouth every 6 (six) hours as needed for Pain. Pt states she takes 500mg BID for arm pain. One in AM and one at QHS, Disp: , Rfl:     CALCIUM CARBONATE/VITAMIN D3 (CALCIUM 500 + D, D3, ORAL), Take by mouth 2 (two) times daily.  , Disp: , Rfl:     lisinopriL 10 MG tablet, TAKE 1 TABLET(10 MG) BY MOUTH EVERY DAY, Disp: 90 tablet, Rfl: 1    magnesium 250 mg Tab, , Disp: , Rfl:     multivitamin capsule, Take 1 capsule by mouth once daily., Disp: , Rfl:      Review of Systems  Review of Systems   Constitutional: Negative.    HENT: Negative.     Eyes: Negative.    Respiratory: Negative.     Cardiovascular: Negative.    Gastrointestinal: Negative.    Genitourinary: Negative.    Musculoskeletal: Negative.    Skin: Negative.    Neurological: Negative.    Endo/Heme/Allergies: Negative.    Psychiatric/Behavioral:  The patient is nervous/anxious.       Physical Exam      Vitals:    12/21/22 1305   BP: 137/63   Pulse: 79   Temp: 97.2 °F (36.2 °C)     Body mass index is 26.15 kg/m².  Physical Exam  Vitals reviewed.   Constitutional:       Appearance: Normal  appearance.   Neurological:      Mental Status: She is alert.   Psychiatric:         Mood and Affect: Mood normal.         Behavior: Behavior normal.        ASSESSMENT :  1. Invasive ductal carcinoma of left breast in female    2. Invasive ductal carcinoma of breast, female, left    3. Anxious mood as adjustment reaction           PLAN:  Reviewed all information discussed at today's visit and all questions were answered.    Counseled on healthy lifestyle and behavior modifications   Referral to Oncology Psychology  I discussed and recommended the following support services: Unable to participate due to not being vaccinated.    Follow up with Integrative Services in 4 months    I spent a total of 40 minutes on the day of the visit.This includes face to face time and non-face to face time preparing to see the patient (eg, review of tests), obtaining and/or reviewing separately obtained history, documenting clinical information in the electronic or other health record, independently interpreting results and communicating results to the patient/family/caregiver, or care coordinator.

## 2022-12-28 DIAGNOSIS — C50.912 INVASIVE DUCTAL CARCINOMA OF LEFT BREAST IN FEMALE: Primary | ICD-10-CM

## 2022-12-28 NOTE — NURSING
Called the patient to see what type of surgery she has decided on.  She said she thinks she might be interested in implant reconstruction.  Told her we need to send her to a plastic surgeon for consult.  Clinic notes, demographic, and referral sent to Dr Hemphill's office and a message sent to his nurse.

## 2022-12-29 ENCOUNTER — PATIENT MESSAGE (OUTPATIENT)
Dept: HEMATOLOGY/ONCOLOGY | Facility: CLINIC | Age: 67
End: 2022-12-29
Payer: MEDICARE

## 2023-01-04 DIAGNOSIS — C50.912 INVASIVE DUCTAL CARCINOMA OF LEFT BREAST IN FEMALE: Primary | ICD-10-CM

## 2023-01-05 DIAGNOSIS — C50.912 INVASIVE DUCTAL CARCINOMA OF LEFT BREAST IN FEMALE: Primary | ICD-10-CM

## 2023-01-05 NOTE — NURSING
Patient came in with her son, as he is getting complimentary genetic testing as part of a VUS resolution program with [facility].  She had many questions regarding surgery and oncology.  Answered them to her satisfaction and told her we will be ordering prehab for her now so she can get it done before surgery. She verbalized understanding.

## 2023-01-31 ENCOUNTER — TELEPHONE (OUTPATIENT)
Dept: HEMATOLOGY/ONCOLOGY | Facility: CLINIC | Age: 68
End: 2023-01-31
Payer: MEDICARE

## 2023-01-31 ENCOUNTER — TELEPHONE (OUTPATIENT)
Dept: FAMILY MEDICINE | Facility: CLINIC | Age: 68
End: 2023-01-31
Payer: MEDICARE

## 2023-01-31 NOTE — TELEPHONE ENCOUNTER
I spoke with the patient and informed her of the need to schedule a pre/post op PT appt, recommended by Dr. Odonnell. I explained what the appt is designed to treat and what the consult will consist of. She voiced understanding and verbalized acceptance of appt. Location was reviewed and asked her to call back once she knows when her sx date is, that way if its sooner that her prehab we can change appt. She voiced understanding.

## 2023-01-31 NOTE — TELEPHONE ENCOUNTER
----- Message from Harriet Ritchie, Patient Care Assistant sent at 1/31/2023  1:24 PM CST -----  Regarding: advice  Contact: pt  Type: Needs Medical Advice    Who Called:  pt     Best Call Back Number: [phone] (home)       Additional Information: pt states she would like a callback regarding an medical clearance. Thanks!

## 2023-01-31 NOTE — TELEPHONE ENCOUNTER
Attempted to contact pt. No answer. Left VM for pt to contact clinic for appt. Sent portal message. Pt needing to schedule pre-op surgery appt for clearance.

## 2023-02-01 ENCOUNTER — TELEPHONE (OUTPATIENT)
Dept: FAMILY MEDICINE | Facility: CLINIC | Age: 68
End: 2023-02-01
Payer: MEDICARE

## 2023-02-01 NOTE — TELEPHONE ENCOUNTER
Pt requesting to be seen for pre-op for mastectomy on 3/8/2023  No availability until 3/7/2023  Please advise

## 2023-02-01 NOTE — TELEPHONE ENCOUNTER
----- Message from Emma Feldman sent at 2/1/2023 11:36 AM CST -----  Regarding: pt call back  Who Called:PIETRO ODONNELL [[id number]]         What is the reqeust in detail: Pt returning call to schedule pre op appt. Please advise         Can the clinic reply by [facility]?no         Best Call Back Number:[phone]           Additional Information:

## 2023-02-06 ENCOUNTER — OFFICE VISIT (OUTPATIENT)
Dept: FAMILY MEDICINE | Facility: CLINIC | Age: 68
End: 2023-02-06
Payer: MEDICARE

## 2023-02-06 ENCOUNTER — LAB VISIT (OUTPATIENT)
Dept: LAB | Facility: HOSPITAL | Age: 68
End: 2023-02-06
Attending: FAMILY MEDICINE
Payer: MEDICARE

## 2023-02-06 VITALS
SYSTOLIC BLOOD PRESSURE: 124 MMHG | WEIGHT: 152.56 LBS | HEIGHT: 64 IN | OXYGEN SATURATION: 98 % | BODY MASS INDEX: 26.05 KG/M2 | RESPIRATION RATE: 18 BRPM | HEART RATE: 94 BPM | DIASTOLIC BLOOD PRESSURE: 72 MMHG

## 2023-02-06 DIAGNOSIS — S00.83XD FACIAL BRUISING, SUBSEQUENT ENCOUNTER: Primary | ICD-10-CM

## 2023-02-06 DIAGNOSIS — Z91.81 HISTORY OF RECENT FALL: ICD-10-CM

## 2023-02-06 DIAGNOSIS — E78.5 HYPERLIPIDEMIA, UNSPECIFIED HYPERLIPIDEMIA TYPE: ICD-10-CM

## 2023-02-06 LAB
ANION GAP SERPL CALC-SCNC: 11 MMOL/L (ref 8–16)
BUN SERPL-MCNC: 17 MG/DL (ref 8–23)
CALCIUM SERPL-MCNC: 9.9 MG/DL (ref 8.7–10.5)
CHLORIDE SERPL-SCNC: 106 MMOL/L (ref 95–110)
CHOLEST SERPL-MCNC: 216 MG/DL (ref 120–199)
CHOLEST/HDLC SERPL: 3.4 {RATIO} (ref 2–5)
CO2 SERPL-SCNC: 23 MMOL/L (ref 23–29)
CREAT SERPL-MCNC: 0.8 MG/DL (ref 0.5–1.4)
EST. GFR  (NO RACE VARIABLE): >60 ML/MIN/1.73 M^2
GLUCOSE SERPL-MCNC: 85 MG/DL (ref 70–110)
HDLC SERPL-MCNC: 64 MG/DL (ref 40–75)
HDLC SERPL: 29.6 % (ref 20–50)
LDLC SERPL CALC-MCNC: 121.4 MG/DL (ref 63–159)
NONHDLC SERPL-MCNC: 152 MG/DL
POTASSIUM SERPL-SCNC: 4.2 MMOL/L (ref 3.5–5.1)
SODIUM SERPL-SCNC: 140 MMOL/L (ref 136–145)
TRIGL SERPL-MCNC: 153 MG/DL (ref 30–150)

## 2023-02-06 PROCEDURE — 3074F PR MOST RECENT SYSTOLIC BLOOD PRESSURE < 130 MM HG: ICD-10-PCS | Mod: HCNC,CPTII,S$GLB, | Performed by: STUDENT IN AN ORGANIZED HEALTH CARE EDUCATION/TRAINING PROGRAM

## 2023-02-06 PROCEDURE — 3074F SYST BP LT 130 MM HG: CPT | Mod: HCNC,CPTII,S$GLB, | Performed by: STUDENT IN AN ORGANIZED HEALTH CARE EDUCATION/TRAINING PROGRAM

## 2023-02-06 PROCEDURE — 99999 PR PBB SHADOW E&M-EST. PATIENT-LVL III: ICD-10-PCS | Mod: PBBFAC,HCNC,, | Performed by: STUDENT IN AN ORGANIZED HEALTH CARE EDUCATION/TRAINING PROGRAM

## 2023-02-06 PROCEDURE — 3008F PR BODY MASS INDEX (BMI) DOCUMENTED: ICD-10-PCS | Mod: HCNC,CPTII,S$GLB, | Performed by: STUDENT IN AN ORGANIZED HEALTH CARE EDUCATION/TRAINING PROGRAM

## 2023-02-06 PROCEDURE — 80048 BASIC METABOLIC PNL TOTAL CA: CPT | Mod: HCNC | Performed by: FAMILY MEDICINE

## 2023-02-06 PROCEDURE — 1160F RVW MEDS BY RX/DR IN RCRD: CPT | Mod: HCNC,CPTII,S$GLB, | Performed by: STUDENT IN AN ORGANIZED HEALTH CARE EDUCATION/TRAINING PROGRAM

## 2023-02-06 PROCEDURE — 99999 PR PBB SHADOW E&M-EST. PATIENT-LVL III: CPT | Mod: PBBFAC,HCNC,, | Performed by: STUDENT IN AN ORGANIZED HEALTH CARE EDUCATION/TRAINING PROGRAM

## 2023-02-06 PROCEDURE — 3008F BODY MASS INDEX DOCD: CPT | Mod: HCNC,CPTII,S$GLB, | Performed by: STUDENT IN AN ORGANIZED HEALTH CARE EDUCATION/TRAINING PROGRAM

## 2023-02-06 PROCEDURE — 3078F PR MOST RECENT DIASTOLIC BLOOD PRESSURE < 80 MM HG: ICD-10-PCS | Mod: HCNC,CPTII,S$GLB, | Performed by: STUDENT IN AN ORGANIZED HEALTH CARE EDUCATION/TRAINING PROGRAM

## 2023-02-06 PROCEDURE — 1159F MED LIST DOCD IN RCRD: CPT | Mod: HCNC,CPTII,S$GLB, | Performed by: STUDENT IN AN ORGANIZED HEALTH CARE EDUCATION/TRAINING PROGRAM

## 2023-02-06 PROCEDURE — 1100F PTFALLS ASSESS-DOCD GE2>/YR: CPT | Mod: HCNC,CPTII,S$GLB, | Performed by: STUDENT IN AN ORGANIZED HEALTH CARE EDUCATION/TRAINING PROGRAM

## 2023-02-06 PROCEDURE — 99213 PR OFFICE/OUTPT VISIT, EST, LEVL III, 20-29 MIN: ICD-10-PCS | Mod: HCNC,S$GLB,, | Performed by: STUDENT IN AN ORGANIZED HEALTH CARE EDUCATION/TRAINING PROGRAM

## 2023-02-06 PROCEDURE — 3288F FALL RISK ASSESSMENT DOCD: CPT | Mod: HCNC,CPTII,S$GLB, | Performed by: STUDENT IN AN ORGANIZED HEALTH CARE EDUCATION/TRAINING PROGRAM

## 2023-02-06 PROCEDURE — 1100F PR PT FALLS ASSESS DOC 2+ FALLS/FALL W/INJURY/YR: ICD-10-PCS | Mod: HCNC,CPTII,S$GLB, | Performed by: STUDENT IN AN ORGANIZED HEALTH CARE EDUCATION/TRAINING PROGRAM

## 2023-02-06 PROCEDURE — 1159F PR MEDICATION LIST DOCUMENTED IN MEDICAL RECORD: ICD-10-PCS | Mod: HCNC,CPTII,S$GLB, | Performed by: STUDENT IN AN ORGANIZED HEALTH CARE EDUCATION/TRAINING PROGRAM

## 2023-02-06 PROCEDURE — 1126F PR PAIN SEVERITY QUANTIFIED, NO PAIN PRESENT: ICD-10-PCS | Mod: HCNC,CPTII,S$GLB, | Performed by: STUDENT IN AN ORGANIZED HEALTH CARE EDUCATION/TRAINING PROGRAM

## 2023-02-06 PROCEDURE — 3078F DIAST BP <80 MM HG: CPT | Mod: HCNC,CPTII,S$GLB, | Performed by: STUDENT IN AN ORGANIZED HEALTH CARE EDUCATION/TRAINING PROGRAM

## 2023-02-06 PROCEDURE — 1160F PR REVIEW ALL MEDS BY PRESCRIBER/CLIN PHARMACIST DOCUMENTED: ICD-10-PCS | Mod: HCNC,CPTII,S$GLB, | Performed by: STUDENT IN AN ORGANIZED HEALTH CARE EDUCATION/TRAINING PROGRAM

## 2023-02-06 PROCEDURE — 1126F AMNT PAIN NOTED NONE PRSNT: CPT | Mod: HCNC,CPTII,S$GLB, | Performed by: STUDENT IN AN ORGANIZED HEALTH CARE EDUCATION/TRAINING PROGRAM

## 2023-02-06 PROCEDURE — 36415 COLL VENOUS BLD VENIPUNCTURE: CPT | Mod: HCNC,PN | Performed by: FAMILY MEDICINE

## 2023-02-06 PROCEDURE — 80061 LIPID PANEL: CPT | Mod: HCNC | Performed by: FAMILY MEDICINE

## 2023-02-06 PROCEDURE — 99213 OFFICE O/P EST LOW 20 MIN: CPT | Mod: HCNC,S$GLB,, | Performed by: STUDENT IN AN ORGANIZED HEALTH CARE EDUCATION/TRAINING PROGRAM

## 2023-02-06 PROCEDURE — 3288F PR FALLS RISK ASSESSMENT DOCUMENTED: ICD-10-PCS | Mod: HCNC,CPTII,S$GLB, | Performed by: STUDENT IN AN ORGANIZED HEALTH CARE EDUCATION/TRAINING PROGRAM

## 2023-02-06 RX ORDER — AMOXICILLIN AND CLAVULANATE POTASSIUM 875; 125 MG/1; MG/1
1 TABLET, FILM COATED ORAL 2 TIMES DAILY
COMMUNITY
Start: 2023-02-03 | End: 2023-02-14

## 2023-02-06 NOTE — PROGRESS NOTES
Plan:      Tegan was seen today for fall and facial injury.    Diagnoses and all orders for this visit:    Facial bruising, subsequent encounter    History of recent fall    Recovery uncomplicated, expect bruising to continue to gradually heal. Continue supportive care.    Follow up if symptoms worsen or fail to improve.    Ev Campbell MD  02/06/2023    Subjective:      Patient ID: Tegan Alarcon is a 67 y.o. female    Chief Complaint   Patient presents with    Fall     Pt stated she fell 5 days ago    Facial Injury     Right side      HPI  67 y.o. female with a PMHx as documented below presents to clinic today for the following:    S/p ground level fall about 5 days ago, hit head/nose, no LOC/altered consciousness. Pt went to ED ([place]) afterwards - workup including CT head negative. Today, pt reports pain at the bridge of the nose, intermittent dull headaches, and healing bruising. No associated dizziness, lightheadedness, vision changes. Headaches resolve w/ Tylenol PRN.    Past Medical History:   Diagnosis Date    Bone disorder     pre osteoporosis    Hypertension     diagnosed 6/2014    PONV (postoperative nausea and vomiting)       Current Outpatient Medications   Medication Instructions    acetaminophen (TYLENOL) 500 mg, Oral, Every 6 hours PRN, Pt states she takes 500mg BID for arm pain. One in AM and one at QHS     amoxicillin-clavulanate 875-125mg (AUGMENTIN) 875-125 mg per tablet 1 tablet, Oral, 2 times daily    CALCIUM CARBONATE/VITAMIN D3 (CALCIUM 500 + D, D3, ORAL) 2 times daily    lisinopriL 10 MG tablet TAKE 1 TABLET(10 MG) BY MOUTH EVERY DAY    magnesium 250 mg Tab No dose, route, or frequency recorded.    multivitamin capsule 1 capsule, Oral, Daily      Review of Systems   Constitutional:  Negative for chills and fever.   Respiratory:  Negative for shortness of breath.    Cardiovascular:  Negative for chest pain.   Gastrointestinal:  Negative for abdominal pain, constipation, diarrhea,  "nausea and vomiting.   Genitourinary:  Negative for dysuria.   Neurological:  Positive for headaches.     Objective:      Vitals:    02/06/23 1438   BP: 124/72   BP Location: Left arm   Patient Position: Sitting   Pulse: 94   Resp: 18   SpO2: 98%   Weight: 69.2 kg (152 lb 8.9 oz)   Height: 5' 4" (1.626 m)     Body mass index is 26.19 kg/m².    Physical Exam  Vitals reviewed.   Constitutional:       General: She is not in acute distress.  HENT:      Head: Normocephalic and atraumatic.      Comments: Small, superficial laceration across the bridge of the nose with steri-strips still applied. Overlying scabs but no surrounding erythema/edema. Healing bruising across both eyes, L > R.  Eyes:      General:         Right eye: No discharge.         Left eye: No discharge.      Extraocular Movements: Extraocular movements intact.      Conjunctiva/sclera: Conjunctivae normal.      Pupils: Pupils are equal, round, and reactive to light.      Comments: Extraocular movements fully intact. Visual acuity unchanged.   Cardiovascular:      Rate and Rhythm: Normal rate.   Pulmonary:      Effort: Pulmonary effort is normal. No respiratory distress.   Neurological:      General: No focal deficit present.      Mental Status: She is alert and oriented to person, place, and time. Mental status is at baseline.      Cranial Nerves: No cranial nerve deficit.      Assessment:       1. Facial bruising, subsequent encounter    2. History of recent fall        Ev Campbell MD  [facility]  Office: [phone]   Fax: [phone]  02/06/2023      Disclaimer: This note was partly generated using dictation software which may occasionally result in transcription errors.    Total time spent on this encounter includes face to face time and non-face to face time preparing to see the patient (eg, review of tests), obtaining and/or reviewing separately obtained history, documenting clinical information in the " electronic or other health record, independently interpreting results and communicating results to the patient/family/caregiver, or care coordinator.

## 2023-02-07 ENCOUNTER — DOCUMENTATION ONLY (OUTPATIENT)
Dept: SURGERY | Facility: CLINIC | Age: 68
End: 2023-02-07
Payer: MEDICARE

## 2023-02-07 DIAGNOSIS — Z00.00 ENCOUNTER FOR MEDICARE ANNUAL WELLNESS EXAM: ICD-10-CM

## 2023-02-08 ENCOUNTER — TELEPHONE (OUTPATIENT)
Dept: SURGERY | Facility: CLINIC | Age: 68
End: 2023-02-08
Payer: MEDICARE

## 2023-02-08 DIAGNOSIS — C50.912 INVASIVE DUCTAL CARCINOMA OF LEFT BREAST IN FEMALE: Primary | ICD-10-CM

## 2023-02-08 NOTE — TELEPHONE ENCOUNTER
Called the patient to see if she is interested in Home Health after her surgery.  She would like to have their service.  Orders placed and told her they will call to set up visits.  She verbalized understanding.

## 2023-02-09 ENCOUNTER — OFFICE VISIT (OUTPATIENT)
Dept: OBSTETRICS AND GYNECOLOGY | Facility: CLINIC | Age: 68
End: 2023-02-09
Payer: MEDICARE

## 2023-02-09 VITALS — BODY MASS INDEX: 26 KG/M2 | SYSTOLIC BLOOD PRESSURE: 122 MMHG | DIASTOLIC BLOOD PRESSURE: 86 MMHG | WEIGHT: 151.44 LBS

## 2023-02-09 DIAGNOSIS — Z00.00 ENCOUNTER FOR MEDICARE ANNUAL WELLNESS EXAM: ICD-10-CM

## 2023-02-09 DIAGNOSIS — Z01.419 WELL WOMAN EXAM: Primary | ICD-10-CM

## 2023-02-09 PROCEDURE — 3074F PR MOST RECENT SYSTOLIC BLOOD PRESSURE < 130 MM HG: ICD-10-PCS | Mod: HCNC,CPTII,S$GLB, | Performed by: STUDENT IN AN ORGANIZED HEALTH CARE EDUCATION/TRAINING PROGRAM

## 2023-02-09 PROCEDURE — 3288F FALL RISK ASSESSMENT DOCD: CPT | Mod: HCNC,CPTII,S$GLB, | Performed by: STUDENT IN AN ORGANIZED HEALTH CARE EDUCATION/TRAINING PROGRAM

## 2023-02-09 PROCEDURE — 1101F PT FALLS ASSESS-DOCD LE1/YR: CPT | Mod: HCNC,CPTII,S$GLB, | Performed by: STUDENT IN AN ORGANIZED HEALTH CARE EDUCATION/TRAINING PROGRAM

## 2023-02-09 PROCEDURE — G0101 CA SCREEN;PELVIC/BREAST EXAM: HCPCS | Mod: HCNC,S$GLB,, | Performed by: STUDENT IN AN ORGANIZED HEALTH CARE EDUCATION/TRAINING PROGRAM

## 2023-02-09 PROCEDURE — 3008F PR BODY MASS INDEX (BMI) DOCUMENTED: ICD-10-PCS | Mod: HCNC,CPTII,S$GLB, | Performed by: STUDENT IN AN ORGANIZED HEALTH CARE EDUCATION/TRAINING PROGRAM

## 2023-02-09 PROCEDURE — 1159F PR MEDICATION LIST DOCUMENTED IN MEDICAL RECORD: ICD-10-PCS | Mod: HCNC,CPTII,S$GLB, | Performed by: STUDENT IN AN ORGANIZED HEALTH CARE EDUCATION/TRAINING PROGRAM

## 2023-02-09 PROCEDURE — 3079F DIAST BP 80-89 MM HG: CPT | Mod: HCNC,CPTII,S$GLB, | Performed by: STUDENT IN AN ORGANIZED HEALTH CARE EDUCATION/TRAINING PROGRAM

## 2023-02-09 PROCEDURE — 1159F MED LIST DOCD IN RCRD: CPT | Mod: HCNC,CPTII,S$GLB, | Performed by: STUDENT IN AN ORGANIZED HEALTH CARE EDUCATION/TRAINING PROGRAM

## 2023-02-09 PROCEDURE — 3074F SYST BP LT 130 MM HG: CPT | Mod: HCNC,CPTII,S$GLB, | Performed by: STUDENT IN AN ORGANIZED HEALTH CARE EDUCATION/TRAINING PROGRAM

## 2023-02-09 PROCEDURE — 99999 PR PBB SHADOW E&M-EST. PATIENT-LVL III: ICD-10-PCS | Mod: PBBFAC,HCNC,, | Performed by: STUDENT IN AN ORGANIZED HEALTH CARE EDUCATION/TRAINING PROGRAM

## 2023-02-09 PROCEDURE — 3288F PR FALLS RISK ASSESSMENT DOCUMENTED: ICD-10-PCS | Mod: HCNC,CPTII,S$GLB, | Performed by: STUDENT IN AN ORGANIZED HEALTH CARE EDUCATION/TRAINING PROGRAM

## 2023-02-09 PROCEDURE — 3008F BODY MASS INDEX DOCD: CPT | Mod: HCNC,CPTII,S$GLB, | Performed by: STUDENT IN AN ORGANIZED HEALTH CARE EDUCATION/TRAINING PROGRAM

## 2023-02-09 PROCEDURE — 99999 PR PBB SHADOW E&M-EST. PATIENT-LVL III: CPT | Mod: PBBFAC,HCNC,, | Performed by: STUDENT IN AN ORGANIZED HEALTH CARE EDUCATION/TRAINING PROGRAM

## 2023-02-09 PROCEDURE — 3079F PR MOST RECENT DIASTOLIC BLOOD PRESSURE 80-89 MM HG: ICD-10-PCS | Mod: HCNC,CPTII,S$GLB, | Performed by: STUDENT IN AN ORGANIZED HEALTH CARE EDUCATION/TRAINING PROGRAM

## 2023-02-09 PROCEDURE — G0101 PR CA SCREEN;PELVIC/BREAST EXAM: ICD-10-PCS | Mod: HCNC,S$GLB,, | Performed by: STUDENT IN AN ORGANIZED HEALTH CARE EDUCATION/TRAINING PROGRAM

## 2023-02-09 PROCEDURE — 1101F PR PT FALLS ASSESS DOC 0-1 FALLS W/OUT INJ PAST YR: ICD-10-PCS | Mod: HCNC,CPTII,S$GLB, | Performed by: STUDENT IN AN ORGANIZED HEALTH CARE EDUCATION/TRAINING PROGRAM

## 2023-02-09 NOTE — PROGRESS NOTES
"History & Physical  Gynecology      SUBJECTIVE:     Chief Complaint: Establish Care and Annual Exam       History of Present Illness:    Here today for annual. Just diagnosed with breast cancer ER+ MS+. About to have double mastectomy.     Gyn: no PMB, no HRT, hx of hyst/bso years ago for "female problems" denies any hx of abnormal pap or biopsy prior to hyst. Sexually active occasionally. Was using some vaginal cream but stopped. FH of breast cancer, no other gyn or colon cancers    No tobacco     Review of patient's allergies indicates:  No Known Allergies    Past Medical History:   Diagnosis Date    Bone disorder     pre osteoporosis    Hypertension     diagnosed 2014    PONV (postoperative nausea and vomiting)      Past Surgical History:   Procedure Laterality Date    breast biopsies      BREAST BIOPSY      BREAST SURGERY      COLONOSCOPY N/A 10/9/2020    Procedure: COLONOSCOPY;  Surgeon: Surinder Conley MD;  Location: [facility];  Service: Endoscopy;  Laterality: N/A;    EYE SURGERY  2018    HYSTERECTOMY      TONSILLECTOMY      TOTAL ABDOMINAL HYSTERECTOMY W/ BILATERAL SALPINGOOPHORECTOMY       OB History          1    Para   1    Term   1            AB        Living             SAB        IAB        Ectopic        Multiple        Live Births                   Family History   Problem Relation Age of Onset    Breast cancer Mother 45    Heart disease Father     Hypertension Father     Hypertension Paternal Grandmother     Stroke Paternal Grandmother     Breast cancer Maternal Aunt     Breast cancer Maternal Aunt     Breast cancer Maternal Aunt     Breast cancer Cousin     Breast cancer Cousin     Breast cancer Other         cousins/several aunts     Social History     Tobacco Use    Smoking status: Never    Smokeless tobacco: Never   Substance Use Topics    Alcohol use: No    Drug use: Never       Current Outpatient Medications   Medication Sig    acetaminophen (TYLENOL) 500 MG " tablet Take 500 mg by mouth every 6 (six) hours as needed for Pain. Pt states she takes 500mg BID for arm pain. One in AM and one at [facility]    amoxicillin-clavulanate 875-125mg (AUGMENTIN) 875-125 mg per tablet Take 1 tablet by mouth 2 (two) times daily.    CALCIUM CARBONATE/VITAMIN D3 (CALCIUM 500 + D, D3, ORAL) Take by mouth 2 (two) times daily.      lisinopriL 10 MG tablet TAKE 1 TABLET(10 MG) BY MOUTH EVERY DAY    magnesium 250 mg Tab     multivitamin capsule Take 1 capsule by mouth once daily.     No current facility-administered medications for this visit.         Review of Systems:  Review of Systems   Constitutional:  Negative for chills, fatigue and fever.   HENT:  Negative for congestion.    Eyes:  Negative for visual disturbance.   Respiratory:  Negative for cough and shortness of breath.    Cardiovascular:  Negative for chest pain and palpitations.   Gastrointestinal:  Negative for abdominal distention, abdominal pain, constipation, diarrhea, nausea and vomiting.   Genitourinary:  Negative for difficulty urinating, dysuria, hematuria, vaginal bleeding and vaginal discharge.   Skin:  Negative for rash.   Neurological:  Negative for dizziness, seizures, light-headedness and headaches.   Hematological:  Does not bruise/bleed easily.   Psychiatric/Behavioral:  Negative for dysphoric mood. The patient is not nervous/anxious.       OBJECTIVE:     Physical Exam:  Physical Exam  Vitals reviewed.   Constitutional:       General: She is not in acute distress.     Appearance: Normal appearance. She is well-developed.   HENT:      Head: Normocephalic and atraumatic.   Cardiovascular:      Rate and Rhythm: Normal rate and regular rhythm.   Pulmonary:      Effort: Pulmonary effort is normal.   Abdominal:      General: There is no distension.      Palpations: Abdomen is soft.   Genitourinary:     Vagina: Normal.      Comments: Normal external female genitalia, normal hair distribution. Vaginal mucosa pink, moist, well  rugated, scant white physiologic discharge.cuff intact, no lesions. Small cystocele. No masses palpated     Skin:     General: Skin is warm.   Neurological:      Mental Status: She is alert and oriented to person, place, and time.   Psychiatric:         Behavior: Behavior normal.         Thought Content: Thought content normal.         Judgment: Judgment normal.         ASSESSMENT:       ICD-10-CM ICD-9-CM    1. Well woman exam  Z01.419 V72.31           No orders of the defined types were placed in this encounter.          Plan:      Well woman:  - screening  exam WNL  - no need for pap  - MMG deferred, breast cancer  - up to date on DEXA and colonoscopy  - encourage remi Wall M.D.  Obstetrics and Gynecology

## 2023-02-11 RX ORDER — LISINOPRIL 10 MG/1
10 TABLET ORAL DAILY
Qty: 90 TABLET | Refills: 1 | Status: SHIPPED | OUTPATIENT
Start: 2023-02-11 | End: 2023-08-21

## 2023-02-11 NOTE — TELEPHONE ENCOUNTER
Refill Decision Note   Tegan Alarcon  is requesting a refill authorization.  Brief Assessment and Rationale for Refill:  Approve     Medication Therapy Plan:       Medication Reconciliation Completed: No   Comments:     No Care Gaps recommended.     Note composed:12:03 PM 02/11/2023

## 2023-02-11 NOTE — TELEPHONE ENCOUNTER
No new care gaps identified.  [facility] Embedded Care Gaps. Reference number: [id number]. 2/11/2023   3:22:46 AM CST

## 2023-02-14 ENCOUNTER — OFFICE VISIT (OUTPATIENT)
Dept: FAMILY MEDICINE | Facility: CLINIC | Age: 68
End: 2023-02-14
Payer: MEDICARE

## 2023-02-14 ENCOUNTER — HOSPITAL ENCOUNTER (OUTPATIENT)
Dept: RADIOLOGY | Facility: HOSPITAL | Age: 68
Discharge: HOME | End: 2023-02-14
Attending: FAMILY MEDICINE
Payer: MEDICARE

## 2023-02-14 VITALS
HEIGHT: 64 IN | WEIGHT: 152.44 LBS | HEART RATE: 77 BPM | RESPIRATION RATE: 14 BRPM | BODY MASS INDEX: 26.03 KG/M2 | DIASTOLIC BLOOD PRESSURE: 68 MMHG | SYSTOLIC BLOOD PRESSURE: 110 MMHG

## 2023-02-14 DIAGNOSIS — Z01.818 PREOP EXAMINATION: Primary | ICD-10-CM

## 2023-02-14 DIAGNOSIS — Z01.818 PREOP EXAMINATION: ICD-10-CM

## 2023-02-14 DIAGNOSIS — C50.912 INVASIVE DUCTAL CARCINOMA OF BREAST, FEMALE, LEFT: ICD-10-CM

## 2023-02-14 PROCEDURE — 71046 X-RAY EXAM CHEST 2 VIEWS: CPT | Mod: 26,HCNC,, | Performed by: RADIOLOGY

## 2023-02-14 PROCEDURE — 93005 EKG 12-LEAD: ICD-10-PCS | Mod: HCNC,S$GLB,, | Performed by: FAMILY MEDICINE

## 2023-02-14 PROCEDURE — 3074F PR MOST RECENT SYSTOLIC BLOOD PRESSURE < 130 MM HG: ICD-10-PCS | Mod: HCNC,CPTII,S$GLB, | Performed by: FAMILY MEDICINE

## 2023-02-14 PROCEDURE — 99999 PR PBB SHADOW E&M-EST. PATIENT-LVL IV: CPT | Mod: PBBFAC,HCNC,, | Performed by: FAMILY MEDICINE

## 2023-02-14 PROCEDURE — 3288F FALL RISK ASSESSMENT DOCD: CPT | Mod: HCNC,CPTII,S$GLB, | Performed by: FAMILY MEDICINE

## 2023-02-14 PROCEDURE — 99214 PR OFFICE/OUTPT VISIT, EST, LEVL IV, 30-39 MIN: ICD-10-PCS | Mod: HCNC,S$GLB,, | Performed by: FAMILY MEDICINE

## 2023-02-14 PROCEDURE — 3074F SYST BP LT 130 MM HG: CPT | Mod: HCNC,CPTII,S$GLB, | Performed by: FAMILY MEDICINE

## 2023-02-14 PROCEDURE — 1100F PTFALLS ASSESS-DOCD GE2>/YR: CPT | Mod: HCNC,CPTII,S$GLB, | Performed by: FAMILY MEDICINE

## 2023-02-14 PROCEDURE — 3078F DIAST BP <80 MM HG: CPT | Mod: HCNC,CPTII,S$GLB, | Performed by: FAMILY MEDICINE

## 2023-02-14 PROCEDURE — 3078F PR MOST RECENT DIASTOLIC BLOOD PRESSURE < 80 MM HG: ICD-10-PCS | Mod: HCNC,CPTII,S$GLB, | Performed by: FAMILY MEDICINE

## 2023-02-14 PROCEDURE — 1125F AMNT PAIN NOTED PAIN PRSNT: CPT | Mod: HCNC,CPTII,S$GLB, | Performed by: FAMILY MEDICINE

## 2023-02-14 PROCEDURE — 1125F PR PAIN SEVERITY QUANTIFIED, PAIN PRESENT: ICD-10-PCS | Mod: HCNC,CPTII,S$GLB, | Performed by: FAMILY MEDICINE

## 2023-02-14 PROCEDURE — 3288F PR FALLS RISK ASSESSMENT DOCUMENTED: ICD-10-PCS | Mod: HCNC,CPTII,S$GLB, | Performed by: FAMILY MEDICINE

## 2023-02-14 PROCEDURE — 1159F PR MEDICATION LIST DOCUMENTED IN MEDICAL RECORD: ICD-10-PCS | Mod: HCNC,CPTII,S$GLB, | Performed by: FAMILY MEDICINE

## 2023-02-14 PROCEDURE — 71046 X-RAY EXAM CHEST 2 VIEWS: CPT | Mod: TC,HCNC,PN

## 2023-02-14 PROCEDURE — 1159F MED LIST DOCD IN RCRD: CPT | Mod: HCNC,CPTII,S$GLB, | Performed by: FAMILY MEDICINE

## 2023-02-14 PROCEDURE — 93005 ELECTROCARDIOGRAM TRACING: CPT | Mod: HCNC,S$GLB,, | Performed by: FAMILY MEDICINE

## 2023-02-14 PROCEDURE — 99214 OFFICE O/P EST MOD 30 MIN: CPT | Mod: HCNC,S$GLB,, | Performed by: FAMILY MEDICINE

## 2023-02-14 PROCEDURE — 1160F RVW MEDS BY RX/DR IN RCRD: CPT | Mod: HCNC,CPTII,S$GLB, | Performed by: FAMILY MEDICINE

## 2023-02-14 PROCEDURE — 93010 EKG 12-LEAD: ICD-10-PCS | Mod: HCNC,S$GLB,, | Performed by: INTERNAL MEDICINE

## 2023-02-14 PROCEDURE — 1160F PR REVIEW ALL MEDS BY PRESCRIBER/CLIN PHARMACIST DOCUMENTED: ICD-10-PCS | Mod: HCNC,CPTII,S$GLB, | Performed by: FAMILY MEDICINE

## 2023-02-14 PROCEDURE — 3008F BODY MASS INDEX DOCD: CPT | Mod: HCNC,CPTII,S$GLB, | Performed by: FAMILY MEDICINE

## 2023-02-14 PROCEDURE — 4010F ACE/ARB THERAPY RXD/TAKEN: CPT | Mod: HCNC,CPTII,S$GLB, | Performed by: FAMILY MEDICINE

## 2023-02-14 PROCEDURE — 4010F PR ACE/ARB THEARPY RXD/TAKEN: ICD-10-PCS | Mod: HCNC,CPTII,S$GLB, | Performed by: FAMILY MEDICINE

## 2023-02-14 PROCEDURE — 3008F PR BODY MASS INDEX (BMI) DOCUMENTED: ICD-10-PCS | Mod: HCNC,CPTII,S$GLB, | Performed by: FAMILY MEDICINE

## 2023-02-14 PROCEDURE — 1100F PR PT FALLS ASSESS DOC 2+ FALLS/FALL W/INJURY/YR: ICD-10-PCS | Mod: HCNC,CPTII,S$GLB, | Performed by: FAMILY MEDICINE

## 2023-02-14 PROCEDURE — 71046 XR CHEST PA AND LATERAL: ICD-10-PCS | Mod: 26,HCNC,, | Performed by: RADIOLOGY

## 2023-02-14 PROCEDURE — 93010 ELECTROCARDIOGRAM REPORT: CPT | Mod: HCNC,S$GLB,, | Performed by: INTERNAL MEDICINE

## 2023-02-14 PROCEDURE — 99999 PR PBB SHADOW E&M-EST. PATIENT-LVL IV: ICD-10-PCS | Mod: PBBFAC,HCNC,, | Performed by: FAMILY MEDICINE

## 2023-02-14 NOTE — PATIENT INSTRUCTIONS
Voltaren gel - anti-inflammatory that can be used topically for areas of joint/muscle discomfort. Hold medicine for 7 days prior to surgery.    The week prior and following surgery: at least 1/2 capful every day to maintain 1 formed stool daily.   [employer] - protein shakes for snacks.   Fluids: 80-100oz/day of clear liquids.

## 2023-02-14 NOTE — PROGRESS NOTES
Subjective:       Patient ID: Tegan Alarcon is a 67 y.o. female.    Chief Complaint: Pre-op Exam      Tegan Alarcon is in the office for preop.    HPI  Patient scheduled for double mastectomy for R IDC and possible recon same day at [facility] on 3/8. Anticipates same day surgery.   Past Medical History:   Diagnosis Date    Bone disorder     pre osteoporosis    Hypertension     diagnosed 6/2014    PONV (postoperative nausea and vomiting)          Current Outpatient Medications:     acetaminophen (TYLENOL) 500 MG tablet, Take 500 mg by mouth every 6 (six) hours as needed for Pain. Pt states she takes 500mg BID for arm pain. One in AM and one at [facility], Disp: , Rfl:     CALCIUM CARBONATE/VITAMIN D3 (CALCIUM 500 + D, D3, ORAL), Take by mouth 2 (two) times daily.  , Disp: , Rfl:     lisinopriL 10 MG tablet, Take 1 tablet (10 mg total) by mouth once daily., Disp: 90 tablet, Rfl: 1    magnesium 250 mg Tab, nightly as needed., Disp: , Rfl:     multivitamin capsule, Take 1 capsule by mouth once daily., Disp: , Rfl:     The 10-year ASCVD risk score (Nba DOWNING, et al., 2019) is: 5.4%    Values used to calculate the score:      Age: 67 years      Sex: Female      Is Non- : No      Diabetic: No      Tobacco smoker: No      Systolic Blood Pressure: 110 mmHg      Is BP treated: Yes      HDL Cholesterol: 64 mg/dL      Total Cholesterol: 216 mg/dL     Lab Results   Component Value Date    HGBA1C 5.3 09/15/2022    HGBA1C 5.1 10/18/2016     Lab Results   Component Value Date    LDLCALC 121.4 02/06/2023    CREATININE 0.8 02/06/2023   Labs 2023 rev. Significant improvement in her cholesterol panel once she restarted her morning overnight oats.     Review of Systems   Constitutional:  Negative for fatigue and unexpected weight change.   HENT:  Negative for congestion, postnasal drip, rhinorrhea, sneezing and sore throat.    Eyes:  Negative for redness and visual disturbance.        Exam up to date   Respiratory:   Negative for apnea (+baby snoring, no reported apnea), cough (occ) and shortness of breath.    Cardiovascular:  Negative for chest pain and leg swelling.   Gastrointestinal:  Positive for constipation (improved with miralax 1-2x/week). Negative for abdominal pain and blood in stool.   Genitourinary:  Negative for difficulty urinating, dysuria and frequency (nighttime x 0-1).   Musculoskeletal:  Negative for arthralgias, back pain and myalgias.        Walking 2mi, 3x/week.   R shoulder - previous PT.   Skin:  Negative for color change and rash.        Saw derm/grieshaber for f/u.   Neurological:  Negative for dizziness, light-headedness and headaches.   Psychiatric/Behavioral:  Negative for dysphoric mood and sleep disturbance (magnesium helps). The patient is not nervous/anxious.        Objective:      Physical Exam  Vitals and nursing note reviewed.   Constitutional:       General: She is not in acute distress.     Appearance: Normal appearance. She is well-developed.   HENT:      Head: Normocephalic and atraumatic.      Right Ear: Tympanic membrane and external ear normal.      Left Ear: Tympanic membrane and external ear normal.      Nose: Nose normal.      Mouth/Throat:      Pharynx: No oropharyngeal exudate.   Eyes:      Conjunctiva/sclera: Conjunctivae normal.      Pupils: Pupils are equal, round, and reactive to light.   Neck:      Thyroid: No thyromegaly.   Cardiovascular:      Rate and Rhythm: Normal rate and regular rhythm.   Pulmonary:      Effort: Pulmonary effort is normal. No respiratory distress.      Breath sounds: Normal breath sounds. No wheezing.   Abdominal:      General: Bowel sounds are normal. There is no distension.      Palpations: Abdomen is soft. There is no mass.      Tenderness: There is no abdominal tenderness. There is no guarding or rebound.   Musculoskeletal:         General: No signs of injury.      Cervical back: Neck supple.      Right lower leg: No edema.      Left lower leg: No  edema.   Lymphadenopathy:      Cervical: No cervical adenopathy.   Skin:     General: Skin is warm and dry.   Neurological:      General: No focal deficit present.      Mental Status: She is alert and oriented to person, place, and time.      Cranial Nerves: No cranial nerve deficit.   Psychiatric:         Mood and Affect: Mood normal.         Behavior: Behavior normal.           Screening recommendations appropriate to age and health status were reviewed.    Assessment & Plan:    Preop examination  -     EKG 12-lead; Future  -     Urinalysis, Reflex to Urine Culture Urine, Clean Catch; Future  -     X-Ray Chest PA And Lateral; Future; Expected date: 02/14/2023    Invasive ductal carcinoma of breast, female, left

## 2023-02-22 ENCOUNTER — TELEPHONE (OUTPATIENT)
Dept: FAMILY MEDICINE | Facility: CLINIC | Age: 68
End: 2023-02-22
Payer: MEDICARE

## 2023-02-22 DIAGNOSIS — R94.31 ABNORMAL EKG: Primary | ICD-10-CM

## 2023-02-22 NOTE — TELEPHONE ENCOUNTER
Nonspecific changes noted on her ekg. Often related to stress, upcoming procedure. I do recommend review in cardiology, not urgent.

## 2023-02-22 NOTE — TELEPHONE ENCOUNTER
Spoke w/ pt. Advised as recommended. Pt would like to see Dr Chan(ST);. Phone number given to sched appt. Confirmed that this does not need to be done prior to surgery. Please advise if recommended otherwise. Pt aware MD out until Mon.

## 2023-03-01 ENCOUNTER — CLINICAL SUPPORT (OUTPATIENT)
Dept: REHABILITATION | Facility: HOSPITAL | Age: 68
End: 2023-03-01
Payer: MEDICARE

## 2023-03-01 ENCOUNTER — PATIENT MESSAGE (OUTPATIENT)
Dept: FAMILY MEDICINE | Facility: CLINIC | Age: 68
End: 2023-03-01
Payer: MEDICARE

## 2023-03-01 DIAGNOSIS — C50.912 INVASIVE DUCTAL CARCINOMA OF LEFT BREAST IN FEMALE: ICD-10-CM

## 2023-03-01 PROCEDURE — 97161 PT EVAL LOW COMPLEX 20 MIN: CPT | Mod: HCNC,PN

## 2023-03-01 NOTE — PROGRESS NOTES
[facility]  Physical Therapy Initial Evaluation PRE-OP  Lymphedema Therapy    Visit Date: 3/1/2023     Name: Tegan Alarcon  Clinic Number: [id number]  Therapy Diagnosis: No diagnosis found.  Physician: Rosa Odonnell MD  Physician Orders: PT Eval and Treat  Medical Diagnosis from Referral: At risk for lymphedema, invasive ductal carcinoma of left breast  Chart review pertaining to cancer hx:    12/14/22 MRI   Right neg  Left 1100 UIQ posterior [id number] mass, 8cm FN, 2.6cm from medial skin, 2cm from pec  LN neg   Cancer Staging   Invasive ductal carcinoma of left breast in female  Staging form: Breast, AJCC 8th Edition  - Clinical stage from 12/9/2022: Stage IA (cT1b, cN0, cM0, G1, ER+, MS+, HER2-) - Signed by Rosa Odonnell MD on 12/26/2022     Left far UIQ grade1 strong ER MS pos IDC. Low Ki. Her 2 neg by FISH     Has all options  Left 1100 rod lump, left SLNB vs mast, recon, unilateral or bilateral   Xrt  Endocrine  Final recs after final path. Oncotype on final path per cattie     She is leaning toward bilat mast, left SLNB,is having implants.  Surgery date: 3/8/2023  Radiation:  not anticipating any need for   Chemotherapy: not anticipating any need for chemo  Evaluation Date: 3/1/2023    Plan of Care Expiration: PT f/u 8 weeks post-op      Visit: 1 / 3    Time In: 1 PM  Time Out: 2 PM  Total Billable Time: 60 minutes    Precautions: Standard, cancer and avoid BP, IV, blood draws or injections in L UE    Subjective     Pt reports: interested in learning about getting ready for surgery  Pain denies pain  Past Medical History:   Past Medical History:   Diagnosis Date    Bone disorder     pre osteoporosis    Hypertension     diagnosed 6/2014    PONV (postoperative nausea and vomiting)        Past Surgical History:  has a past surgical history that includes breast biopsies; Total abdominal hysterectomy w/ bilateral salpingoophorectomy; Eye surgery (2018); Breast surgery  (2000); Tonsillectomy (1977); Hysterectomy (1999); Colonoscopy (N/A, 10/9/2020); and Breast biopsy.    Medications: has a current medication list which includes the following prescription(s): acetaminophen, calcium carbonate/vitamin d3, lisinopril, magnesium, and multivitamin.    Allergies: Review of patient's allergies indicates:  No Known Allergies       Hand Dominance: Right  Diet: good appeitie  Habitus: well developed, well nourished    Prior Therapy/Previous treatment included: No PT this calendar year. R shoulder decrease in use of R secondary pulled muscle at gym  DME owned: none  Social History: lives with their spouse  Place of Residence (Steps/Adaptations): no stairs  Occupation: Pt is retired.   Prior Exercise Routine: walks 3 x week     Patient's Goals: lymphedema prevention    Objective     Mental Status: Alert/Oriented    Observations  Posture: forward head, rounded shoulders  Joint Integrity: WFLs bilateral  Skin Integrity: intact bilateral  Edema: none bilateral    Sensation  Light Touch: intact bilateral  Proprioception: intact bilateral    A/PROM  (L) UE: WFLs  (R) UE: WFLs except end range slightly decreased in shoulder abd/ flexion  Limitations:  none    STRENGTH  (L) UE: WFLs  (R) UE: WFLs  Limitations:  none    Baseline Measurements of BUEs  LANDMARK LEFT UE (cm)   W + 15 inches 31.4    W + 13 inches 29.8    Elbow 26.6    W + 7 inches 25.0    W + 5  inches 22.4    Wrist 15.2    DPC 18.0    IP Thumb 5.8   Garments recommended: Jobst Christie Lite upper arm compression sleeve in size medium in regular length with silicone band at top with 15-20 mmHg.  Jobst Christie Lite gauntlet in size small with 15-20 mmHg. Wear during they day and remove when you go to bed.  Pt to wear wraps 6 weeks after surgery. Please call insurance company to see if they will cover the garments. If they do not cover it, they are cheaper online.     Functional Mobility   Bed mobility: independent   Roll to left: independent    Roll to right: independent   Supine to prone: independent   Scooting to edge of bed: independent   Supine to sit: independent   Sit to supine: independent   Transfers to bed: independent   Transfers to toilet: independent   Sit to stand: independent   Stand pivot: independent   Car transfers: independent     Gait Assessment  AD used: none  Assistance: independent  Distance: community distances  Endurance: WFL     Gait Pattern: WFL       Treatment     Treatment Time In: 1 PM  Treatment Time Out: 2 PM  Total Treatment time separate from Evaluation: 60 minutes      Self-Care/Home Management to improve behavioral/activity modifications related to ADLs, compensatory training, safety procedures, and adaptive equipment for 15 minutes including:  Garments: PT recommend wearing garments for one year per guidelines for prophylactical assist to decrease risk for lymphedema  Skin care  Weight management  Sleep  Nutrition  Infection prevention      Therapeutic Exercise to develop ROM, flexibility, and posture for 15 minutes including:  Surgical protocol for position recommendations  Diaphragmatic Breathing  Exercises by day, complete with pictures of exercises and description for safe progression of motion    Education: Instructed on general anatomy/physiology, lymphedema information (definitions, signs, symptoms, precautions), role of therapy in multi-disciplinary team, purpose of lymphedema physical therapy and the benefits/risks of treatment, risks of refusing treatment, POC, and goals for therapy were discussed with the pt.    Written Home Exercises Provided: yes.  Exercises were reviewed and Tegan was able to demonstrate them prior to the end of the session. Tegan demonstrated good  understanding of the education provided.     See EMR under Patient Instructions for exercises provided 3/1/2023.    Assessment     Tegan is a 67 y.o. female referred to outpatient physical therapy with a medical diagnosis of  at risk for  lymphedema, invasive ductal carcinoma of left breast with surgical procedure planned on 3/8/2023. Pt was seen today pre-operatively to assess strength and ROM of BUEs, to take baseline circumferential measurements of BUEs to aid in the early detection of lymphedema post-operatively, and to provide pt education on exercises/precautions post-operative. Pt does not exhibit any ROM impairments currently. This pt will benefit from skilled PT for reassessment of baseline measurements 8 week post-operatively and to address future impairments following surgery such as pain, limited ROM, or decreased mobility. Will need to wait until pt is medically cleared to determine if pt is safe for MLD, pneumatic compression pump, or lymphatouch treatments.      Plan of care discussed with patient: Yes  Pt's spiritual, cultural and educational needs considered and patient is agreeable to the plan of care and goals as stated below:     Anticipated barriers for therapy:  none    Medical Necessity is demonstrated by the following:  History  Co-morbidities and personal factors that may impact the plan of care Co-morbidities:   history of cancer    Personal Factors:   none     low   Examination  Body Structures and Functions, activity limitations and participation restrictions that may impact the plan of care Body Systems:    gross symmetry    Activity limitations:   Mobility  no deficits    Self care  no deficits    Domestic Life  no deficits    Participation Restrictions:   none         low   Clinical Presentation evolving clinical presentation with changing clinical characteristics moderate   Decision Making/ Complexity Score: low       GOALS  Short Term Goals: 3 months  Pt to be seen for reassessment in 6-8 weeks after surgery.  Pt will demonstrate 100% knowledge of lymphedema precautions and signs of infection.  Pt to obtain compression garments for prophylactic concerns according to APTA clinical guidelines published in Journal of  Physical Therapy.    Long Term Goals: deferred    Plan     Plan of Care: 3/1/2023 to 5/20/2023.    Patient to be seen in 8 weeks following surgical date for 2 visits for reassessment. Patient will benefit from Outpatient Physical Therapy services which may include the following interventions: patient education, HEP, therapeutic exercises, neuromuscular re-education, therapeutic activity, manual therapy, self care/home management, modalities, gait training, decongestive massage, multi-layered bandaging, self massage, self bandaging, and assistance in obtaining appropriate compression garment.      If pt is to undergo XRT, POC must exclude MLD, pneumatic compression pump, and lymphatouch to any radiated area.       Marsha Caruso, PT, CLT

## 2023-03-01 NOTE — TELEPHONE ENCOUNTER
Attempted to contact pt. No answer. Left VM for pt to return call to clinic. Sent pt portal message notifying of Dr. Miguel response.

## 2023-03-01 NOTE — PATIENT INSTRUCTIONS
Garments recommended: Jobst Christie Lite upper arm compression sleeve in size medium in regular length with silicone band at top with 15-20 mmHg.  Jobst Christie Lite gauntlet in size small with 15-20 mmHg. Wear during they day and remove when you go to bed.  Pt to wear wraps 6 weeks after surgery. Please call insurance company to see if they will cover the garments. If they do not cover it, they are cheaper online.   If they do cover it, you can obtain them from a DME store and keep receipt. PT will get MD to order and the order will be in your chart in 3-5 days from this eval. The insurance company typically wants the orer and the receipt from a [employer] company and not online.    [facility]  [address] [facility] [address], [place] [address] [phone]    At [facility] you can find:   Also wear a supportive athletic bra that comes with high axillary support. Iocally entire back is covered, with zipper in front.       After healed from surgery need to check into getting with a certified bra specialist .. insurance will likely cover at least 2 bras a year.  Orthotic & Prosthetic Specialist with [address] [address] [phone]  Post Operative Breast Cancer Surgery Exercises    After surgery your shoulder and chest may feel tight and sore. Movement may cause stretching across your chest, axilla (armpit), and the incision site limiting your ability to raise your arm. Exercise will be extremely important in preventing swelling and in helping you regain movement, strength, and use of your arm.     Your post-operative exercise program can be divided into three stages. Your surgeon will inform you when to move to the next stage.     STAGE TIME PURPOSE EXERCISE   I Post-op day 1 to 4  (Drains are in) To prevent and/or reduce swelling - Positioning  - Hand and arm precautions   II Post-op day 5 to 14  (After drains are removed) To provide gentle movement without much  stretching  - Shoulder shrugs  - Shoulder retraction   III Post-op day 15-6 wks  (After suture are removed) To stretch and regain full motion - Wall ladder  - Range of motion exercises  - Wand exercises       These exercises are general guideline for use following a mastectomy. Please consult your physician for additional information. Of a tissue expander has been placed, or if you have had reconstruction, you must get approval from your physician before beginning exercises.   Check with your physician prior to beginning a new stage.  Avoid elbows above shoulders until after post-op day 14.    STAGE 1      Abdominal Breathing Exercises      Get comfortable and relax your neck and shoulders. You can sit or lie down. Place one hand on your upper chest and place the other hand on your belly button. Use your hands to feel the movements as you breathe in and out.  Take a deep breath in through your nose and feel the hand on your stomach move out. Do not let your shoulders move up. The hand on your chest should not move.   Breathe out slow and gentle through your mouth. Pucker your lips as if you were going to whistle or blow out a candle. The hand on your stomach should move in as you breathe out. Breathe out as long as you can until all the air is gone.   To help keep the lymphatic system moving well, practice two breaths every hour using the steps for abdominal breathing exercises.        Positioning    Several times a day, elevate your arm on pillows so your hand is above the level of your heart. This position will allow gravity to drain excess fluids out of your hand and arm. Try to place pillows under involved arm while in sitting position or while laying down.                STAGE 2    Shoulder Shrugs Shoulder Retraction    While sitting with arms supported, shrug both of your shoulder and then relax. Repeat 10 times, 3 times a day.   While sitting or standing, pull both shoulder back, bringing shoulder blades  together. Repeat 10 times,   3 times a day.        STAGE 3  Range of Motion Exercises    To retain full motion of your shoulder, it must be moved in all planes, several times a day. Begin arm range of motion exercises with 10 reps of each, 2 times a day. Progress to 3 x 10 reps, as tolerated 2 times a day.    Wand - Shoulder Flexion       Lay on your back in a comfortable position. Raise both arms overhead, then bring back down by your side.        Wand - Shoulder Abduction       Lay on your back in a comfortable position. Raise both arms out to your side, then bring back down by your side.        Wand - Shoulder External Rotation      Lay on your back in a comfortable position. Position your arms as pictured below, with your elbows bent and your hand in the arm. Slowly lower your hand down, then bring back up.        Wand - Shoulder Flexion      Hold onto a cane or broom with both hands approximately 14 inches apart with arms straight at your side. Raise the cane forward and upwards as far as you can go or until your elbow is near your ear. Then gradually return to the original position.        Wand - Shoulder Abduction      Hold onto the ends of a cane with both hands, then raise the involved arm sideways and upward over your head with the aid of the cane and the good arm. Keep trunk straight! Then gradually return to original position.        Wand - Shoulder External Rotation      Holding onto a cane with both hands approximately 14 inches apart at shoulder level, turn the cane clockwise and then counterclockwise as far as possible.        Wall Climbing - Shoulder Flexion      Stand facing the wall and extend the involved arm directly in front of you so that your fingertips touch the wall (at arm's length away from the wall). Creep up the side of the wall with your fingertips, taking a step toward the wall as you reach higher and higher up the wall. Repeat this procedure going down the wall, but taking a step  backward this time. Begin with 5 wall climbs, then progress to 10 reps at a time, 1-2 times a day.        Wall Climbing - Shoulder Abduction     ([email or web address]  home-[email or web address]-[email or web address]-frozen-shoulder/) Repeat the above procedure, but this time position yourself perpendicular (at a right angle) to the wall so that the involved arm will extend sideways up the wall. Keep your trunk straight, not leaning toward the wall or shrugging your shoulder. Place a esther (tape) on the wall each week to see if you are making progress. Begin with 5 wall climbs, then progress to 10 reps at a time, 1-2 times a day.        PRECAUTIONS    As a result of the removal of lymph nodes and vessels, your arm is susceptible to infection and swelling. A hot, reddened or swollen arm denotes the presence of infection and should be brought to the attention of your physician immediately. Try to avoid cuts, scratches, pinpricks, hangnails, sunburns, insect bites, chemical irritation, and irritating detergent.    The following are helpful hints:    Avoid injections, blood draws, blood pressure, and IVs to be done to your involved arm. If you have undergone axillary dissection, then consider using the opposite only for injections, blood draws, blood pressure, and IVs.  Prevent chapping of your hand and arm by applying lotion liberally several times a day. Recommend Eucerin lotion, No massages to affected upper extremity if you have had lymph nodes dissection or radiation to lymph nodes.   Do not cut nails too close to the quick. Do not cut or pick your cuticles. Use cuticle cream or remover.  Avoid carrying heavy objects (over 5 lbs) with your involved arm.   Protect your arm from burns with small electrical appliances such as irons, curling irons, and frying pans.   Wear sunscreen in the sun.  Apply insect repellent when going to areas where you might be exposed to insect bites.   Use an electric razor for shaving.   Wear loose  fitting work/rubber gloves when washing dishes, using , or using steel wool.  Wear garden gloves when gardening or arranging thorn flowers.  Do not wear tight jewelry on your affected arm.  Do not wear narrow tight straps on clothing or under garments.    IMPORTANT    If you do cut, burn, or javier the skin, wash the area and use an antiseptic. If it becomes red, warm, or unusually hard or swollen, contact your physician immediately.     If there is swelling without redness, increased warmth or hardness, the positioning and pumping exercises as described above can help decrease the swelling. Position your hand higher than the elbow, elbow higher than the shoulder, and shoulder higher than your heart. Maintain this position for 30 minutes. Repeat as necessary.     OCCUPATIONAL AND PHYSICAL THERAPY    Most women have enough motion in their involved arm to perform normal activities within 2 months after surgery. Any post-operative swelling or pain has probably disappeared. However; some women may develop persistent stiffness, weakness, pain, or swelling. If this is your experience, call your physician. He or she may recommend that a physical or occupational therapist work with you to minimize your problems.     CONCLUSION    Besides your exercise program, your daily routine at home can be continued and will be beneficial toward your recovery:  Getting dressed every day  Daily grooming  Cooking and light housework  Being active with family and friends    REMINDER  Pace yourself!  Strive for a balance between work and relaxation  Avoid excessive pulling and lifting which may strain your shoulder

## 2023-03-02 ENCOUNTER — TELEPHONE (OUTPATIENT)
Dept: FAMILY MEDICINE | Facility: CLINIC | Age: 68
End: 2023-03-02
Payer: MEDICARE

## 2023-03-02 NOTE — TELEPHONE ENCOUNTER
Spoke with pt. Reported that she is scheduled with Cardiologist today. Informed pt that he is [facility] so he will have access to records for appt. Pt verbalized understanding.

## 2023-03-02 NOTE — TELEPHONE ENCOUNTER
----- Message from Kristal Hobbs sent at 3/1/2023  3:32 PM CST -----  Regarding: returning call  Contact: patient  Type:  Patient Returning Call    Who Called:  patient  Who Left Message for Patient:  Navya  Does the patient know what this is regarding?:  no  Best Call Back Number: [phone]  Additional Information:  Please call patient to advise. Thanks!

## 2023-03-03 DIAGNOSIS — C50.912 INVASIVE DUCTAL CARCINOMA OF LEFT BREAST IN FEMALE: ICD-10-CM

## 2023-03-03 DIAGNOSIS — Z91.89 AT RISK FOR LYMPHEDEMA: Primary | ICD-10-CM

## 2023-03-06 DIAGNOSIS — Z91.89 AT RISK FOR LYMPHEDEMA: ICD-10-CM

## 2023-03-06 DIAGNOSIS — C50.912 INVASIVE DUCTAL CARCINOMA OF LEFT BREAST IN FEMALE: Primary | ICD-10-CM

## 2023-03-21 ENCOUNTER — DOCUMENTATION ONLY (OUTPATIENT)
Dept: SURGERY | Facility: CLINIC | Age: 68
End: 2023-03-21
Payer: MEDICARE

## 2023-03-27 ENCOUNTER — TELEPHONE (OUTPATIENT)
Dept: HEMATOLOGY/ONCOLOGY | Facility: CLINIC | Age: 68
End: 2023-03-27
Payer: MEDICARE

## 2023-03-27 NOTE — NURSING
Oncotype not back yet.  Spoke to pt.  Rescheduled appt to 4/6/23.  Will let pt know if results aren't back by then.  Asked pt to call with any questions or concerns.  Pt verbalized understanding.

## 2023-03-31 ENCOUNTER — TELEPHONE (OUTPATIENT)
Dept: HEMATOLOGY/ONCOLOGY | Facility: CLINIC | Age: 68
End: 2023-03-31
Payer: MEDICARE

## 2023-03-31 NOTE — NURSING
Spoke to pt.  Oncotype results are back.  Moved appt to Monday 4/3/23 as requested.  Asked pt to call if something changes.  She verbalized understanding.

## 2023-04-03 ENCOUNTER — OFFICE VISIT (OUTPATIENT)
Dept: HEMATOLOGY/ONCOLOGY | Facility: CLINIC | Age: 68
End: 2023-04-03
Payer: MEDICARE

## 2023-04-03 VITALS
DIASTOLIC BLOOD PRESSURE: 70 MMHG | WEIGHT: 157.44 LBS | SYSTOLIC BLOOD PRESSURE: 144 MMHG | BODY MASS INDEX: 26.88 KG/M2 | RESPIRATION RATE: 18 BRPM | TEMPERATURE: 97 F | OXYGEN SATURATION: 97 % | HEART RATE: 84 BPM | HEIGHT: 64 IN

## 2023-04-03 DIAGNOSIS — Z79.810 LONG-TERM CURRENT USE OF TAMOXIFEN: ICD-10-CM

## 2023-04-03 DIAGNOSIS — C50.912 INVASIVE DUCTAL CARCINOMA OF LEFT BREAST IN FEMALE: Primary | ICD-10-CM

## 2023-04-03 PROCEDURE — 99999 PR PBB SHADOW E&M-EST. PATIENT-LVL III: CPT | Mod: PBBFAC,HCNC,, | Performed by: INTERNAL MEDICINE

## 2023-04-03 PROCEDURE — 99214 OFFICE O/P EST MOD 30 MIN: CPT | Mod: HCNC,S$GLB,, | Performed by: INTERNAL MEDICINE

## 2023-04-03 PROCEDURE — 1126F PR PAIN SEVERITY QUANTIFIED, NO PAIN PRESENT: ICD-10-PCS | Mod: HCNC,CPTII,S$GLB, | Performed by: INTERNAL MEDICINE

## 2023-04-03 PROCEDURE — 3077F SYST BP >= 140 MM HG: CPT | Mod: HCNC,CPTII,S$GLB, | Performed by: INTERNAL MEDICINE

## 2023-04-03 PROCEDURE — 1101F PR PT FALLS ASSESS DOC 0-1 FALLS W/OUT INJ PAST YR: ICD-10-PCS | Mod: HCNC,CPTII,S$GLB, | Performed by: INTERNAL MEDICINE

## 2023-04-03 PROCEDURE — 3008F BODY MASS INDEX DOCD: CPT | Mod: HCNC,CPTII,S$GLB, | Performed by: INTERNAL MEDICINE

## 2023-04-03 PROCEDURE — 3288F FALL RISK ASSESSMENT DOCD: CPT | Mod: HCNC,CPTII,S$GLB, | Performed by: INTERNAL MEDICINE

## 2023-04-03 PROCEDURE — 3288F PR FALLS RISK ASSESSMENT DOCUMENTED: ICD-10-PCS | Mod: HCNC,CPTII,S$GLB, | Performed by: INTERNAL MEDICINE

## 2023-04-03 PROCEDURE — 3078F PR MOST RECENT DIASTOLIC BLOOD PRESSURE < 80 MM HG: ICD-10-PCS | Mod: HCNC,CPTII,S$GLB, | Performed by: INTERNAL MEDICINE

## 2023-04-03 PROCEDURE — 1159F PR MEDICATION LIST DOCUMENTED IN MEDICAL RECORD: ICD-10-PCS | Mod: HCNC,CPTII,S$GLB, | Performed by: INTERNAL MEDICINE

## 2023-04-03 PROCEDURE — 99999 PR PBB SHADOW E&M-EST. PATIENT-LVL III: ICD-10-PCS | Mod: PBBFAC,HCNC,, | Performed by: INTERNAL MEDICINE

## 2023-04-03 PROCEDURE — 4010F PR ACE/ARB THEARPY RXD/TAKEN: ICD-10-PCS | Mod: HCNC,CPTII,S$GLB, | Performed by: INTERNAL MEDICINE

## 2023-04-03 PROCEDURE — 99214 PR OFFICE/OUTPT VISIT, EST, LEVL IV, 30-39 MIN: ICD-10-PCS | Mod: HCNC,S$GLB,, | Performed by: INTERNAL MEDICINE

## 2023-04-03 PROCEDURE — 3008F PR BODY MASS INDEX (BMI) DOCUMENTED: ICD-10-PCS | Mod: HCNC,CPTII,S$GLB, | Performed by: INTERNAL MEDICINE

## 2023-04-03 PROCEDURE — 1159F MED LIST DOCD IN RCRD: CPT | Mod: HCNC,CPTII,S$GLB, | Performed by: INTERNAL MEDICINE

## 2023-04-03 PROCEDURE — 3078F DIAST BP <80 MM HG: CPT | Mod: HCNC,CPTII,S$GLB, | Performed by: INTERNAL MEDICINE

## 2023-04-03 PROCEDURE — 1126F AMNT PAIN NOTED NONE PRSNT: CPT | Mod: HCNC,CPTII,S$GLB, | Performed by: INTERNAL MEDICINE

## 2023-04-03 PROCEDURE — 1101F PT FALLS ASSESS-DOCD LE1/YR: CPT | Mod: HCNC,CPTII,S$GLB, | Performed by: INTERNAL MEDICINE

## 2023-04-03 PROCEDURE — 1160F RVW MEDS BY RX/DR IN RCRD: CPT | Mod: HCNC,CPTII,S$GLB, | Performed by: INTERNAL MEDICINE

## 2023-04-03 PROCEDURE — 3077F PR MOST RECENT SYSTOLIC BLOOD PRESSURE >= 140 MM HG: ICD-10-PCS | Mod: HCNC,CPTII,S$GLB, | Performed by: INTERNAL MEDICINE

## 2023-04-03 PROCEDURE — 1160F PR REVIEW ALL MEDS BY PRESCRIBER/CLIN PHARMACIST DOCUMENTED: ICD-10-PCS | Mod: HCNC,CPTII,S$GLB, | Performed by: INTERNAL MEDICINE

## 2023-04-03 PROCEDURE — 4010F ACE/ARB THERAPY RXD/TAKEN: CPT | Mod: HCNC,CPTII,S$GLB, | Performed by: INTERNAL MEDICINE

## 2023-04-03 RX ORDER — TAMOXIFEN CITRATE 20 MG/1
20 TABLET ORAL DAILY
Qty: 90 TABLET | Refills: 6 | Status: SHIPPED | OUTPATIENT
Start: 2023-04-03 | End: 2024-04-02

## 2023-04-03 NOTE — PROGRESS NOTES
PROGRESS NOTE    Subjective:       Patient ID: Tegan Alarcon is a 67 y.o. female.  MRN: [id number]  : 1955    Chief Complaint: IDC left breast    Stage I (T1b, N0, M0, Grade 1, ER+/WI+/HER2 pend)     History of Present Illness:   Tegan Alarcon is a 67 y.o. female who is referred for IDC of the left breast.       Has been undergoing screening mammograms. Has had benign cysts previously , drained with benign findings several years ago, more like 15 years ago. During that time, she was on Tamoxifen for prevention of recurrent cysts and took it for 5 years.     Did not notice any lump or breast mass. Abnormality was noted on screening mammogram this year.     Menarche at age 15 yrs old. .   Age at first live birth 32yrs old.   Did not breast feed.   LMP in . OCP-for about a year in her 20's.   Menopause at 44yrs old, had CINTHIA for prevention.  HRT-None    Family history cancer: Mother at 41yrs old, Maternal Aunt at 80yrs old, and Maternal Cousin at 40yrs old, Maternal cousin at 41yrs old had breast cancer. Genetics negative.     Other medical conditions include well controlled HTN and osteopenia.     Interim history:   She presents today to discuss final pathology and onco type results. She is recovering well from surgery.     Oncology History:  22  Impression:  Left  Asymmetry: Left breast 10 mm asymmetry at the posterior 12 o'clock position. Assessment: 0 - Incomplete. Diagnostic Mammogram and/or Ultrasound is recommended.      Right  There is no mammographic evidence of malignancy in the right breast.     BI-RADS Category:   Overall: 0 - Incomplete: Needs Additional Imaging Evaluation    22  Diagnostic mammogram  Impression:  Left  Mass: Left breast 10 mm x 7 mm x 7 mm mass at the posterior 11 o'clock position. Assessment: 4 - Suspicious finding. Biopsy is recommended.      BI-RADS Category:   Overall: 4 - Suspicious    22  Breast, left,  "mass at 11:00 5N, biopsy:   - Invasive ductal carcinoma   - Silex ndgndrndanddndend:nd nd2nd of 3        - Tubules: 2        - Nuclei: 2        - Mitosis: 1     - Tumor involves multiple core fragments and measures 7 mm in greatest linear   dimension   - Immunostain for p63 support the above interpretation   - Please see RECEPTOR STUDIES below   Dr. ROSSY Mosley has reviewed this case and agrees with the above   interpretation.     RECEPTOR STUDIES   Estrogen receptor:  Positive; strong nuclear staining in 95% of tumor cells   Progesterone receptor:  Positive; strong nuclear staining in 80% of tumor   cells   Her2:  Equivocal  (Stain score = 2+; additional testing for HER2 by FISH has   been requested and will be reported separately upon completion)   Ki-67:  15%     3/8/23  1.  BREAST, LEFT, MASTECTOMY:   --INVASIVE CARCINOMA OF NO SPECIAL TYPE (DUCTAL); IRENE HISTOLOGIC    GRADE 2 OUT OF 3, WITH FOCAL           MICROPAPILLARY FEATURES (APPROXIMATELY 5%).   --INVASIVE CARCINOMA MEASURES 11 MILLIMETERS IN MAXIMUM DIMENSION AND    IS PRESENT IN 11:00 REGION.        --BIOPSY SITE CHANGES.   --RECEPTOR STUDIES REPORTED PREVIOUSLY AT OUTSIDE FACILITY ON BIOPSY    MATERIAL (SEE COMMENT).        --NO LYMPHATIC AND/OR VASCULAR INVASION IDENTIFIED.   --PART 1 SPECIMEN POSTERIOR MARGIN IS POSITIVE FOR INVASIVE CARCINOMA;    ALL REMAINING PART 1   SPECIMEN MARGINS ARE NEGATIVE FOR INVASIVE CARCINOMA; ADDITIONAL    MARGINS SENT SEPARATELY           (SEE BELOW PARTS 2 AND 3).        --ATYPICAL LOBULAR HYPERPLASIA.        --MICROSCOPIC FIBROADENOMATOID NODULE.   --MICROCALCIFICATIONS PRESENT (PREDOMINANTLY IN NON-NEOPLASTIC TISSUE).        --pT1c/pN0.     2.  BREAST, FURTHER DESIGNATED "DEEP MARGIN," EXCISION:        --NO MORPHOLOGIC EVIDENCE OF MALIGNANCY.        --HISTOMORPHOLOGICALLY UNREMARKABLE SKELETAL MUSCLE.     3. BREAST, LEFT, FURTHER DESIGNATED "ANTERIOR SUPERIOR MEDIAL MARGIN,"    EXCISION:        --NO MORPHOLOGIC EVIDENCE OF " MALIGNANCY.     4.  LYMPH NODE, LEFT SENTINEL, EXCISIONAL BIOPSY:        --ONE LYMPH NODE WITH NO MORPHOLOGIC EVIDENCE OF MALIGNANCY (0/1).     5.  BREAST, RIGHT, MASTECTOMY:        --NO MORPHOLOGIC EVIDENCE OF MALIGNANCY.        --ATYPICAL LOBULAR HYPERPLASIA, MULTIFOCAL.   --FIBROCYSTIC CHANGES INCLUDING STROMAL FIBROSIS, CYSTIC DILATION OF    DUCTS, APOCRINE METAPLASIA,           AND FOCAL SCLEROSING ADENOSIS.        --FOCAL MICROCALCIFICATIONS ASSOCIATED WITH NON-NEOPLASTIC TISSUE.   History:  Past Medical History:   Diagnosis Date    Bone disorder     pre osteoporosis    Cancer 2022    breast    Hypertension     diagnosed 6/2014    PONV (postoperative nausea and vomiting)       Past Surgical History:   Procedure Laterality Date    breast biopsies      BREAST BIOPSY      BREAST RECONSTRUCTION Bilateral 3/8/2023    Procedure: RECONSTRUCTION, BREAST - Immediate to Implant;  Surgeon: Guillermo Hemphill MD;  Location: [facility] OR;  Service: Plastics;  Laterality: Bilateral;    BREAST SURGERY  2000    COLONOSCOPY N/A 10/09/2020    Procedure: COLONOSCOPY;  Surgeon: Surinder Conley MD;  Location: [facility] ENDO;  Service: Endoscopy;  Laterality: N/A;    EYE SURGERY Left 2018    lesion on eyelid    INSERTION OF BREAST IMPLANT Bilateral 3/8/2023    Procedure: INSERTION, BREAST IMPLANT;  Surgeon: Guillermo Hemphill MD;  Location: [facility] OR;  Service: Plastics;  Laterality: Bilateral;    PLACEMENT OF ACELLULAR HUMAN DERMAL ALLOGRAFT Bilateral 3/8/2023    Procedure: APPLICATION, ACELLULAR HUMAN DERMAL ALLOGRAFT;  Surgeon: Guillermo Hemphill MD;  Location: [facility] OR;  Service: Plastics;  Laterality: Bilateral;    SENTINEL LYMPH NODE BIOPSY Left 3/8/2023    Procedure: BIOPSY, LYMPH NODE, SENTINEL;  Surgeon: Rosa Odonnell MD;  Location: [facility] OR;  Service: General;  Laterality: Left;    SIMPLE MASTECTOMY Bilateral 3/8/2023    Procedure: MASTECTOMY, SIMPLE;  Surgeon: Rosa Odonnell MD;  Location: [facility] OR;  Service: General;  Laterality:  "Bilateral;    TONSILLECTOMY  1977    TOTAL ABDOMINAL HYSTERECTOMY W/ BILATERAL SALPINGOOPHORECTOMY  1999     Family History   Problem Relation Age of Onset    Breast cancer Mother 45    Heart disease Father     Hypertension Father     Breast cancer Maternal Aunt     Breast cancer Maternal Aunt     Breast cancer Maternal Aunt     Hypertension Paternal Grandmother     Stroke Paternal Grandmother     Breast cancer Cousin     Breast cancer Cousin     Breast cancer Other         cousins/several aunts      Social History     Tobacco Use    Smoking status: Never    Smokeless tobacco: Never   Substance and Sexual Activity    Alcohol use: No    Drug use: Never    Sexual activity: Not on file        ROS:   Review of Systems   Constitutional:  Negative for fever, malaise/fatigue and weight loss.   HENT:  Negative for congestion, hearing loss, nosebleeds and sore throat.    Eyes:  Negative for double vision and photophobia.   Respiratory:  Negative for cough, hemoptysis, sputum production, shortness of breath and wheezing.    Cardiovascular:  Negative for chest pain, palpitations, orthopnea and leg swelling.   Gastrointestinal:  Negative for abdominal pain, blood in stool, constipation, diarrhea, heartburn, nausea and vomiting.   Genitourinary:  Negative for dysuria, hematuria and urgency.   Musculoskeletal:  Negative for back pain, joint pain and myalgias.   Skin:  Negative for itching and rash.   Neurological:  Negative for dizziness, tingling, seizures, weakness and headaches.   Endo/Heme/Allergies:  Negative for polydipsia. Does not bruise/bleed easily.   Psychiatric/Behavioral:  Negative for depression and memory loss. The patient is not nervous/anxious and does not have insomnia.       Objective:     Vitals:    04/03/23 1347   BP: (!) 144/70   Pulse: 84   Resp: 18   Temp: 97.3 °F (36.3 °C)   TempSrc: Temporal   SpO2: 97%   Weight: 71.4 kg (157 lb 6.5 oz)   Height: 5' 4" (1.626 m)   PainSc: 0-No pain       Physical " Examination:   Physical Exam  Vitals and nursing note reviewed.   Constitutional:       General: She is not in acute distress.     Appearance: She is not diaphoretic.   HENT:      Head: Normocephalic.      Mouth/Throat:      Pharynx: No oropharyngeal exudate.   Eyes:      General: No scleral icterus.     Conjunctiva/sclera: Conjunctivae normal.   Neck:      Thyroid: No thyromegaly.   Cardiovascular:      Rate and Rhythm: Normal rate and regular rhythm.      Heart sounds: Normal heart sounds. No murmur heard.  Pulmonary:      Effort: Pulmonary effort is normal. No respiratory distress.      Breath sounds: No stridor. No wheezing or rales.   Chest:      Chest wall: No tenderness.   Abdominal:      General: Bowel sounds are normal. There is no distension.      Palpations: Abdomen is soft. There is no mass.      Tenderness: There is no abdominal tenderness. There is no rebound.   Musculoskeletal:         General: No tenderness or deformity. Normal range of motion.      Cervical back: Neck supple.   Lymphadenopathy:      Cervical: No cervical adenopathy.   Skin:     General: Skin is warm and dry.      Findings: No erythema or rash.      Comments: B/l mastectomy with reconstruction post op changes, healing well   Neurological:      Mental Status: She is alert and oriented to person, place, and time.      Cranial Nerves: No cranial nerve deficit.      Coordination: Coordination normal.      Gait: Gait is intact.   Psychiatric:         Mood and Affect: Affect normal.         Cognition and Memory: Memory normal.         Judgment: Judgment normal.        Diagnostic Tests:  Significant Imaging: I have reviewed and interpreted all pertinent imaging results/findings.      Laboratory Data:  All pertinent labs have been reviewed.    Labs:   Lab Results   Component Value Date    WBC 5.64 03/08/2023    HGB 14.5 03/08/2023    HCT 41.7 03/08/2023    MCV 89 03/08/2023     03/08/2023       Assessment/Plan:   Invasive ductal  carcinoma of left breast in female   Stage I pT1c pN0 ER/FL +, Her 2 equivocal by IHC, neg by FISH    She presented with early stage, node negative breast cancer, now status post bilateral mastectomy with reconstruction.  Recovering well from surgery.    We discussed her Oncotype testing, the low 7 with 3% risk of recurrence at 9 years with endocrine therapy.    We discussed the role of adjuvant endocrine therapy to reduce to risk of recurrence by 50% in the next 10 years. She is a candidate for aromatase inhibitor versus tamoxifen  Side effects including fatigue, hot flashes, bone loss were discussed. Rare side effects including risk of thrombosis, heart disease, rash, mood changes were also discussed. She is agreeable.      Based on [employer], there is no difference in efficacy between tamoxifen or aromatase inhibitor therapy.  Given underlying osteopenia and prior tolerability of Tamoxifen, she has opted for Tamoxifen rather AI.     Start active surveillance. Will see her back in 3  months or earlier in case of any tolerability issues.       Osteopenia  On calcium and vitamin D.        ECOG SCORE    0 - Fully active-able to carry on all pre-disease performance without restriction           Discussion:   No follow-ups on file.    Plan was discussed with the patient at length, and she verbalized understanding. Tegan was given an opportunity to ask questions that were answered to her satisfaction, and she was advised to call in the interval if any problems or questions arise.    Electronically signed by Yessy Stephens MD      Route Chart for Scheduling    Med Onc Chart Routing      Follow up with physician 3 months.   Follow up with JEFFY    Infusion scheduling note    Injection scheduling note    Labs    Imaging    Pharmacy appointment    Other referrals

## 2023-04-24 ENCOUNTER — CLINICAL SUPPORT (OUTPATIENT)
Dept: REHABILITATION | Facility: HOSPITAL | Age: 68
End: 2023-04-24
Payer: MEDICARE

## 2023-04-24 DIAGNOSIS — C50.912 INVASIVE DUCTAL CARCINOMA OF LEFT BREAST IN FEMALE: ICD-10-CM

## 2023-04-24 DIAGNOSIS — Z91.89 AT RISK FOR LYMPHEDEMA: ICD-10-CM

## 2023-04-24 PROCEDURE — 97164 PT RE-EVAL EST PLAN CARE: CPT | Mod: HCNC,PN

## 2023-04-24 NOTE — PATIENT INSTRUCTIONS
Current research recommends following for assisting in prevention of re-occurrence of breast cancer:   Per week 150 minutes of moderate intensity aerobic exercise coupled with resistive exercises with theraband or weightbearing  Journal your progress!    Elbow flexion 12-15 rps 2 x weekly      Tricep extension 12-15 rps 2 x weekly  Triceps Extension (Frontal)        One arm forward at chest height and bent to 90°, end of band in hand, other end secured on same side shoulder by other hand, extend arm.     Scapula Retraction  12-15 reps 2 x weekly      Sit to stand exercises  SIT TO STAND: No Device no hands 12-15 reps 2 x week         Knee extension: 12-15 reps 2 x week       For range of motion to improve to full: try over the door pulleys, find on [employer].   You can ask your surgeon if it is ok, however your protocol from  gives you full permission for pain free shoulder motion..

## 2023-04-24 NOTE — PROGRESS NOTES
Physical Therapy POST -OP Re-Assessment / Daily Treatment Note/Lymphedema Management     Name: Tegan Alarcon  Clinic Number: [id number]    Therapy Diagnosis: At risk for lymphedema,   Physician: Rosa Odonnell MD    Visit Date: 4/24/2023    Physician Orders: PT Eval and treat  Medical Diagnosis:  At risk for lymphedema, invasive ductal carcinoma of left breast  Evaluation Date: 3/1/2023  Authorization Period Expiration: 3/5/2024      Surgery date: 3/8/2023 She is leaning toward bilat mast, left SLNB,is having implants.  Radiation:  not anticipating any need for   Chemotherapy: will be taking tamoxifen 5 years  Time In: 11:00 AM  Time Out: 12PM  Total Billable Time: 60 minutes    Precautions: Standard, cancer and avoid BP, IV, blood draws or injections in L UE  Chart review: Dr. Stephens 4/3/2023 note: Invasive ductal carcinoma of left breast in female   Stage I pT1c pN0 ER/AL +, Her 2 equivocal by IHC, neg by FISH  She presented with early stage, node negative breast cancer, now status post bilateral mastectomy with reconstruction.  Recovering well from surgery.  Subjective     Pt reports: soreness present  She was compliant with home exercise program. Mother past away during middle of recovery so some days shewas not able to complete.  Response to previous treatment: per-op  Functional change: taking it easy until    Pain: 0/10  Location: denies    Objective   Re-Assessment  Baseline Measurements of BUEs  LANDMARK LEFT UE (cm) LEFT UE (cm)   W + 15 inches 31.4  32.5   W + 13 inches 29.8  31.5   Elbow 26.6  26.7   W + 7 inches 25.0  24.2   W + 5  inches 22.4  21.6   Wrist 15.2  14.8   DPC 18.0  17.6   IP Thumb 5.8 5.8   Will be purchasing her garments today PT to provide written orders for pt to use with insurance so that pt can send in with receipts from DME company.   Tegan received therapeutic exercises to develop strength, endurance, ROM, flexibility, and posture for 15 minutes including:  Home Exercises  Provided and Patient Education Provided   yes  Education provided:   - Educated on resistive training ex once cleared from plastic surgeon: bicep, tricep, scap, quad, glut see pt instructions, issued program as well as yellow and red tband. Also advised pt on current research recommends following for assisting in prevention of re-occurrence of breast cancer   Per week 150 minutes of moderate intensity aerobic exercise coupled with resistive exercises with theraband or weightbearing  Also pulleys pt performed prom shoulder flexion and abduction in clinic and advised can purchase online on amazon  Written Home Exercises Provided: yes.  Exercises were reviewed and Tegan was able to demonstrate them prior to the end of the session.  Tegan demonstrated good  understanding of the education provided.     See EMR under Patient Instructions for exercises provided 4/24/2023.    Assessment   Pt with excellent tissue healing B breast, does have reduction in range of motion however pt report fearful of doing something wrong when elevating her arms overhead. Wants to get clearance from MD. Pt with full pain free PROM while performing passive pulley flexion and abduction in clinic. Pt has yet to get her pro-phylactic garments but plans to obtain today.   No indications of lymphedema and pt provided with hep in writing and reviewed today.   At pre-op stevealTegan is a 67 y.o. female referred to outpatient physical therapy with a medical diagnosis of  at risk for lymphedema, invasive ductal carcinoma of left breast with surgical procedure planned on 3/8/2023. Pt was seen today pre-operatively to assess strength and ROM of BUEs, to take baseline circumferential measurements of BUEs to aid in the early detection of lymphedema post-operatively, and to provide pt education on exercises/precautions post-operative. Pt does not exhibit any ROM impairments currently. This pt will benefit from skilled PT for reassessment of baseline  measurements 8 week post-operatively and to address future impairments following surgery such as pain, limited ROM, or decreased mobility. Will need to wait until pt is medically cleared to determine if pt is safe for MLD, pneumatic compression pump, or lymphatouch treatments.      Plan of care discussed with patient: Yes  Pt's spiritual, cultural and educational needs considered and patient is agreeable to the plan of care and goals as stated below:      Anticipated barriers for therapy:  none     Medical Necessity is demonstrated by the following:  History  Co-morbidities and personal factors that may impact the plan of care Co-morbidities:   history of cancer     Personal Factors:   none       low   Examination  Body Structures and Functions, activity limitations and participation restrictions that may impact the plan of care Body Systems:    gross symmetry     Activity limitations:   Mobility  no deficits     Self care  no deficits     Domestic Life  no deficits     Participation Restrictions:   none             low   Clinical Presentation evolving clinical presentation with changing clinical characteristics moderate   Decision Making/ Complexity Score: low         GOALS  Short Term Goals: 3 months  Pt to be seen for reassessment in 6-8 weeks after surgery.GOAL MET 4/24/2023  Pt will demonstrate 100% knowledge of lymphedema precautions and signs of infection.GOAL MET 4/24/2023  Pt to obtain compression garments for prophylactic concerns according to APTA clinical guidelines published in Journal of Physical Therapy. IN PROGRESS 4/24/2023     Long Term Goals: deferred     Plan    Discharge to [facility].recommend obtaining garments.   Marsha Caruso, PT, CLT

## 2023-07-17 ENCOUNTER — OFFICE VISIT (OUTPATIENT)
Dept: HEMATOLOGY/ONCOLOGY | Facility: CLINIC | Age: 68
End: 2023-07-17
Payer: MEDICARE

## 2023-07-17 VITALS
WEIGHT: 158.94 LBS | RESPIRATION RATE: 16 BRPM | SYSTOLIC BLOOD PRESSURE: 133 MMHG | HEART RATE: 82 BPM | BODY MASS INDEX: 27.13 KG/M2 | OXYGEN SATURATION: 98 % | DIASTOLIC BLOOD PRESSURE: 84 MMHG | HEIGHT: 64 IN | TEMPERATURE: 96 F

## 2023-07-17 DIAGNOSIS — M85.80 OSTEOPENIA, UNSPECIFIED LOCATION: ICD-10-CM

## 2023-07-17 DIAGNOSIS — Z79.810 LONG-TERM CURRENT USE OF TAMOXIFEN: ICD-10-CM

## 2023-07-17 DIAGNOSIS — C50.912 INVASIVE DUCTAL CARCINOMA OF LEFT BREAST IN FEMALE: Primary | ICD-10-CM

## 2023-07-17 PROCEDURE — 1101F PR PT FALLS ASSESS DOC 0-1 FALLS W/OUT INJ PAST YR: ICD-10-PCS | Mod: HCNC,CPTII,S$GLB, | Performed by: INTERNAL MEDICINE

## 2023-07-17 PROCEDURE — 4010F PR ACE/ARB THEARPY RXD/TAKEN: ICD-10-PCS | Mod: HCNC,CPTII,S$GLB, | Performed by: INTERNAL MEDICINE

## 2023-07-17 PROCEDURE — 3079F DIAST BP 80-89 MM HG: CPT | Mod: HCNC,CPTII,S$GLB, | Performed by: INTERNAL MEDICINE

## 2023-07-17 PROCEDURE — 1101F PT FALLS ASSESS-DOCD LE1/YR: CPT | Mod: HCNC,CPTII,S$GLB, | Performed by: INTERNAL MEDICINE

## 2023-07-17 PROCEDURE — 3075F SYST BP GE 130 - 139MM HG: CPT | Mod: HCNC,CPTII,S$GLB, | Performed by: INTERNAL MEDICINE

## 2023-07-17 PROCEDURE — 3008F BODY MASS INDEX DOCD: CPT | Mod: HCNC,CPTII,S$GLB, | Performed by: INTERNAL MEDICINE

## 2023-07-17 PROCEDURE — 99999 PR PBB SHADOW E&M-EST. PATIENT-LVL III: ICD-10-PCS | Mod: PBBFAC,HCNC,, | Performed by: INTERNAL MEDICINE

## 2023-07-17 PROCEDURE — 1160F RVW MEDS BY RX/DR IN RCRD: CPT | Mod: HCNC,CPTII,S$GLB, | Performed by: INTERNAL MEDICINE

## 2023-07-17 PROCEDURE — 3079F PR MOST RECENT DIASTOLIC BLOOD PRESSURE 80-89 MM HG: ICD-10-PCS | Mod: HCNC,CPTII,S$GLB, | Performed by: INTERNAL MEDICINE

## 2023-07-17 PROCEDURE — 3075F PR MOST RECENT SYSTOLIC BLOOD PRESS GE 130-139MM HG: ICD-10-PCS | Mod: HCNC,CPTII,S$GLB, | Performed by: INTERNAL MEDICINE

## 2023-07-17 PROCEDURE — 3288F FALL RISK ASSESSMENT DOCD: CPT | Mod: HCNC,CPTII,S$GLB, | Performed by: INTERNAL MEDICINE

## 2023-07-17 PROCEDURE — 3008F PR BODY MASS INDEX (BMI) DOCUMENTED: ICD-10-PCS | Mod: HCNC,CPTII,S$GLB, | Performed by: INTERNAL MEDICINE

## 2023-07-17 PROCEDURE — 1160F PR REVIEW ALL MEDS BY PRESCRIBER/CLIN PHARMACIST DOCUMENTED: ICD-10-PCS | Mod: HCNC,CPTII,S$GLB, | Performed by: INTERNAL MEDICINE

## 2023-07-17 PROCEDURE — 1126F PR PAIN SEVERITY QUANTIFIED, NO PAIN PRESENT: ICD-10-PCS | Mod: HCNC,CPTII,S$GLB, | Performed by: INTERNAL MEDICINE

## 2023-07-17 PROCEDURE — 4010F ACE/ARB THERAPY RXD/TAKEN: CPT | Mod: HCNC,CPTII,S$GLB, | Performed by: INTERNAL MEDICINE

## 2023-07-17 PROCEDURE — 1126F AMNT PAIN NOTED NONE PRSNT: CPT | Mod: HCNC,CPTII,S$GLB, | Performed by: INTERNAL MEDICINE

## 2023-07-17 PROCEDURE — 99999 PR PBB SHADOW E&M-EST. PATIENT-LVL III: CPT | Mod: PBBFAC,HCNC,, | Performed by: INTERNAL MEDICINE

## 2023-07-17 PROCEDURE — 1159F MED LIST DOCD IN RCRD: CPT | Mod: HCNC,CPTII,S$GLB, | Performed by: INTERNAL MEDICINE

## 2023-07-17 PROCEDURE — 99214 PR OFFICE/OUTPT VISIT, EST, LEVL IV, 30-39 MIN: ICD-10-PCS | Mod: HCNC,S$GLB,, | Performed by: INTERNAL MEDICINE

## 2023-07-17 PROCEDURE — 99214 OFFICE O/P EST MOD 30 MIN: CPT | Mod: HCNC,S$GLB,, | Performed by: INTERNAL MEDICINE

## 2023-07-17 PROCEDURE — 1159F PR MEDICATION LIST DOCUMENTED IN MEDICAL RECORD: ICD-10-PCS | Mod: HCNC,CPTII,S$GLB, | Performed by: INTERNAL MEDICINE

## 2023-07-17 PROCEDURE — 3288F PR FALLS RISK ASSESSMENT DOCUMENTED: ICD-10-PCS | Mod: HCNC,CPTII,S$GLB, | Performed by: INTERNAL MEDICINE

## 2023-07-17 NOTE — PROGRESS NOTES
PROGRESS NOTE    Subjective:       Patient ID: Tegan Alarcon is a 67 y.o. female.  MRN: [id number]  : 1955    Chief Complaint: IDC left breast    Stage I (T1b, N0, M0, Grade 1, ER+/PA+/HER2 pend)     History of Present Illness:   Tegan Alarcon is a 67 y.o. female who is referred for IDC of the left breast.       Has been undergoing screening mammograms. Has had benign cysts previously , drained with benign findings several years ago, more like 15 years ago. During that time, she was on Tamoxifen for prevention of recurrent cysts and took it for 5 years.     Did not notice any lump or breast mass. Abnormality was noted on screening mammogram this year.     Menarche at age 15 yrs old. .   Age at first live birth 32yrs old.   Did not breast feed.   LMP in . OCP-for about a year in her 20's.   Menopause at 44yrs old, had CINTHIA for prevention.  HRT-None    Family history cancer: Mother at 41yrs old, Maternal Aunt at 80yrs old, and Maternal Cousin at 40yrs old, Maternal cousin at 41yrs old had breast cancer. Genetics negative.     Other medical conditions include well controlled HTN and osteopenia.     Interim history:   S/p b/l mastectomy and implant based reconstruction.   Oncotype low, no adjuvant chemotherapy was recommended.     She started Tamoxifen 25, tolerating well. Had some hot flashes intially, resolved now. Denies joint pains, has chronic right shoulder mobility issues without any exacerbation.     Oncology History:  22  Impression:  Left  Asymmetry: Left breast 10 mm asymmetry at the posterior 12 o'clock position. Assessment: 0 - Incomplete. Diagnostic Mammogram and/or Ultrasound is recommended.      Right  There is no mammographic evidence of malignancy in the right breast.     BI-RADS Category:   Overall: 0 - Incomplete: Needs Additional Imaging Evaluation    22  Diagnostic mammogram  Impression:  Left  Mass: Left breast 10 mm  "x 7 mm x 7 mm mass at the posterior 11 o'clock position. Assessment: 4 - Suspicious finding. Biopsy is recommended.      BI-RADS Category:   Overall: 4 - Suspicious    12/7/22  Breast, left, mass at 11:00 5N, biopsy:   - Invasive ductal carcinoma   - Betty ndgndrndanddndend:nd nd2nd of 3        - Tubules: 2        - Nuclei: 2        - Mitosis: 1     - Tumor involves multiple core fragments and measures 7 mm in greatest linear   dimension   - Immunostain for p63 support the above interpretation   - Please see RECEPTOR STUDIES below   Dr. ROSSY Mosley has reviewed this case and agrees with the above   interpretation.     RECEPTOR STUDIES   Estrogen receptor:  Positive; strong nuclear staining in 95% of tumor cells   Progesterone receptor:  Positive; strong nuclear staining in 80% of tumor   cells   Her2:  Equivocal  (Stain score = 2+; additional testing for HER2 by FISH has   been requested and will be reported separately upon completion)   Ki-67:  15%     3/8/23  1.  BREAST, LEFT, MASTECTOMY:   --INVASIVE CARCINOMA OF NO SPECIAL TYPE (DUCTAL); BETTY HISTOLOGIC    GRADE 2 OUT OF 3, WITH FOCAL           MICROPAPILLARY FEATURES (APPROXIMATELY 5%).   --INVASIVE CARCINOMA MEASURES 11 MILLIMETERS IN MAXIMUM DIMENSION AND    IS PRESENT IN 11:00 REGION.        --BIOPSY SITE CHANGES.   --RECEPTOR STUDIES REPORTED PREVIOUSLY AT OUTSIDE FACILITY ON BIOPSY    MATERIAL (SEE COMMENT).        --NO LYMPHATIC AND/OR VASCULAR INVASION IDENTIFIED.   --PART 1 SPECIMEN POSTERIOR MARGIN IS POSITIVE FOR INVASIVE CARCINOMA;    ALL REMAINING PART 1   SPECIMEN MARGINS ARE NEGATIVE FOR INVASIVE CARCINOMA; ADDITIONAL    MARGINS SENT SEPARATELY           (SEE BELOW PARTS 2 AND 3).        --ATYPICAL LOBULAR HYPERPLASIA.        --MICROSCOPIC FIBROADENOMATOID NODULE.   --MICROCALCIFICATIONS PRESENT (PREDOMINANTLY IN NON-NEOPLASTIC TISSUE).        --pT1c/pN0.     2.  BREAST, FURTHER DESIGNATED "DEEP MARGIN," EXCISION:        --NO MORPHOLOGIC EVIDENCE OF " "MALIGNANCY.        --HISTOMORPHOLOGICALLY UNREMARKABLE SKELETAL MUSCLE.     3. BREAST, LEFT, FURTHER DESIGNATED "ANTERIOR SUPERIOR MEDIAL MARGIN,"    EXCISION:        --NO MORPHOLOGIC EVIDENCE OF MALIGNANCY.     4.  LYMPH NODE, LEFT SENTINEL, EXCISIONAL BIOPSY:        --ONE LYMPH NODE WITH NO MORPHOLOGIC EVIDENCE OF MALIGNANCY (0/1).     5.  BREAST, RIGHT, MASTECTOMY:        --NO MORPHOLOGIC EVIDENCE OF MALIGNANCY.        --ATYPICAL LOBULAR HYPERPLASIA, MULTIFOCAL.   --FIBROCYSTIC CHANGES INCLUDING STROMAL FIBROSIS, CYSTIC DILATION OF    DUCTS, APOCRINE METAPLASIA,           AND FOCAL SCLEROSING ADENOSIS.        --FOCAL MICROCALCIFICATIONS ASSOCIATED WITH NON-NEOPLASTIC TISSUE.   History:  Past Medical History:   Diagnosis Date    Bone disorder     pre osteoporosis    Cancer 2022    breast    Hypertension     diagnosed 6/2014    PONV (postoperative nausea and vomiting)       Past Surgical History:   Procedure Laterality Date    breast biopsies      BREAST BIOPSY      BREAST RECONSTRUCTION Bilateral 3/8/2023    Procedure: RECONSTRUCTION, BREAST - Immediate to Implant;  Surgeon: Guillermo Hemphill MD;  Location: [facility] OR;  Service: Plastics;  Laterality: Bilateral;    BREAST SURGERY  2000    COLONOSCOPY N/A 10/09/2020    Procedure: COLONOSCOPY;  Surgeon: Surinder Conley MD;  Location: [facility];  Service: Endoscopy;  Laterality: N/A;    EYE SURGERY Left 2018    lesion on eyelid    INSERTION OF BREAST IMPLANT Bilateral 3/8/2023    Procedure: INSERTION, BREAST IMPLANT;  Surgeon: Guillermo Hemphill MD;  Location: [facility] OR;  Service: Plastics;  Laterality: Bilateral;    PLACEMENT OF ACELLULAR HUMAN DERMAL ALLOGRAFT Bilateral 3/8/2023    Procedure: APPLICATION, ACELLULAR HUMAN DERMAL ALLOGRAFT;  Surgeon: Guillermo Hemphill MD;  Location: [facility] OR;  Service: Plastics;  Laterality: Bilateral;    SENTINEL LYMPH NODE BIOPSY Left 3/8/2023    Procedure: BIOPSY, LYMPH NODE, SENTINEL;  Surgeon: Rosa Odonnell MD;  Location: [facility] OR;  Service: " General;  Laterality: Left;    SIMPLE MASTECTOMY Bilateral 3/8/2023    Procedure: MASTECTOMY, SIMPLE;  Surgeon: Rosa Odonnell MD;  Location: [facility] OR;  Service: General;  Laterality: Bilateral;    TONSILLECTOMY      TOTAL ABDOMINAL HYSTERECTOMY W/ BILATERAL SALPINGOOPHORECTOMY       Family History   Problem Relation Age of Onset    Breast cancer Mother 45         at 88    Heart disease Father     Hypertension Father     Breast cancer Maternal Aunt     Breast cancer Maternal Aunt     Breast cancer Maternal Aunt     Hypertension Paternal Grandmother     Stroke Paternal Grandmother     Breast cancer Cousin     Breast cancer Cousin     Breast cancer Other         cousins/several aunts      Social History     Tobacco Use    Smoking status: Never    Smokeless tobacco: Never   Substance and Sexual Activity    Alcohol use: No    Drug use: Never    Sexual activity: Not on file        ROS:   Review of Systems   Constitutional:  Negative for fever, malaise/fatigue and weight loss.   HENT:  Negative for congestion, hearing loss, nosebleeds and sore throat.    Eyes:  Negative for double vision and photophobia.   Respiratory:  Negative for cough, hemoptysis, sputum production, shortness of breath and wheezing.    Cardiovascular:  Negative for chest pain, palpitations, orthopnea and leg swelling.   Gastrointestinal:  Negative for abdominal pain, blood in stool, constipation, diarrhea, heartburn, nausea and vomiting.   Genitourinary:  Negative for dysuria, hematuria and urgency.   Musculoskeletal:  Negative for back pain, joint pain and myalgias.   Skin:  Negative for itching and rash.   Neurological:  Negative for dizziness, tingling, seizures, weakness and headaches.   Endo/Heme/Allergies:  Negative for polydipsia. Does not bruise/bleed easily.   Psychiatric/Behavioral:  Negative for depression and memory loss. The patient is not nervous/anxious and does not have insomnia.       Objective:     Vitals:     "07/17/23 1408   BP: 133/84   Pulse: 82   Resp: 16   Temp: 96.2 °F (35.7 °C)   TempSrc: Temporal   SpO2: 98%   Weight: 72.1 kg (158 lb 15.2 oz)   Height: 5' 4" (1.626 m)   PainSc: 0-No pain       Physical Examination:   Physical Exam  Vitals and nursing note reviewed.   Constitutional:       General: She is not in acute distress.     Appearance: She is not diaphoretic.   HENT:      Head: Normocephalic.      Mouth/Throat:      Pharynx: No oropharyngeal exudate.   Eyes:      General: No scleral icterus.     Conjunctiva/sclera: Conjunctivae normal.   Neck:      Thyroid: No thyromegaly.   Cardiovascular:      Rate and Rhythm: Normal rate and regular rhythm.      Heart sounds: Normal heart sounds. No murmur heard.  Pulmonary:      Effort: Pulmonary effort is normal. No respiratory distress.      Breath sounds: No stridor. No wheezing or rales.   Chest:      Chest wall: No tenderness.   Abdominal:      General: Bowel sounds are normal. There is no distension.      Palpations: Abdomen is soft. There is no mass.      Tenderness: There is no abdominal tenderness. There is no rebound.   Musculoskeletal:         General: No tenderness or deformity. Normal range of motion.      Cervical back: Neck supple.   Lymphadenopathy:      Cervical: No cervical adenopathy.   Skin:     General: Skin is warm and dry.      Findings: No erythema or rash.      Comments: B/l mastectomy with reconstruction post op changes, healing well   Neurological:      Mental Status: She is alert and oriented to person, place, and time.      Cranial Nerves: No cranial nerve deficit.      Coordination: Coordination normal.      Gait: Gait is intact.   Psychiatric:         Mood and Affect: Affect normal.         Cognition and Memory: Memory normal.         Judgment: Judgment normal.        Diagnostic Tests:  Significant Imaging: I have reviewed and interpreted all pertinent imaging results/findings.      Laboratory Data:  All pertinent labs have been " reviewed.    Labs:   Lab Results   Component Value Date    WBC 5.64 03/08/2023    HGB 14.5 03/08/2023    HCT 41.7 03/08/2023    MCV 89 03/08/2023     03/08/2023       Assessment/Plan:   Invasive ductal carcinoma of left breast in female   Stage I pT1c pN0 ER/RI +, Her 2 equivocal by IHC, neg by FISH    She presented with early stage, node negative breast cancer, now status post bilateral mastectomy with reconstruction.    We discussed her Oncotype testing, the low 7 with 3% risk of recurrence at 9 years with endocrine therapy.    See prior notes for discussion, is on Tamoxifen and tolerating it well.     Continue active surveillance. Will see her back in 4  months or earlier in case of any tolerability issues.       Osteopenia  On calcium and vitamin D.        ECOG SCORE    0 - Fully active-able to carry on all pre-disease performance without restriction           Discussion:   No follow-ups on file.    Plan was discussed with the patient at length, and she verbalized understanding. Tegan was given an opportunity to ask questions that were answered to her satisfaction, and she was advised to call in the interval if any problems or questions arise.    Electronically signed by Yessy Stephens MD      Route Chart for Scheduling    Med Onc Chart Routing      Follow up with physician 4 months.   Follow up with JEFFY    Infusion scheduling note    Injection scheduling note    Labs    Imaging    Pharmacy appointment    Other referrals

## 2023-07-20 ENCOUNTER — TELEPHONE (OUTPATIENT)
Dept: HEMATOLOGY/ONCOLOGY | Facility: CLINIC | Age: 68
End: 2023-07-20
Payer: MEDICARE

## 2023-07-20 DIAGNOSIS — C50.912 INVASIVE DUCTAL CARCINOMA OF LEFT BREAST IN FEMALE: Primary | ICD-10-CM

## 2023-07-20 NOTE — NURSING
Spoke with patient regarding Survivorship. Explained to pt that a Survivorship Clinic visit is when you meet with a nurse practitioner after completing treatment for cancer. As a cancer survivor, you may face physical, psychosocial, and practical impacts from cancer and its treatment. A Survivorship Clinic visit will give you information and tools to help you after cancer treatment has ended. Patient accepted appt on 7/25 at 11:00 with Rosalinda Macias NP. Date, time, and location confirmed with patient.

## 2023-07-24 ENCOUNTER — TELEPHONE (OUTPATIENT)
Dept: HEMATOLOGY/ONCOLOGY | Facility: CLINIC | Age: 68
End: 2023-07-24
Payer: MEDICARE

## 2023-07-25 ENCOUNTER — OFFICE VISIT (OUTPATIENT)
Dept: HEMATOLOGY/ONCOLOGY | Facility: CLINIC | Age: 68
End: 2023-07-25
Payer: MEDICARE

## 2023-07-25 VITALS
HEIGHT: 64 IN | SYSTOLIC BLOOD PRESSURE: 129 MMHG | OXYGEN SATURATION: 97 % | TEMPERATURE: 98 F | DIASTOLIC BLOOD PRESSURE: 83 MMHG | WEIGHT: 159.5 LBS | BODY MASS INDEX: 27.23 KG/M2 | HEART RATE: 82 BPM

## 2023-07-25 DIAGNOSIS — C50.912 INVASIVE DUCTAL CARCINOMA OF LEFT BREAST IN FEMALE: ICD-10-CM

## 2023-07-25 PROCEDURE — 3074F PR MOST RECENT SYSTOLIC BLOOD PRESSURE < 130 MM HG: ICD-10-PCS | Mod: HCNC,CPTII,S$GLB, | Performed by: NURSE PRACTITIONER

## 2023-07-25 PROCEDURE — 4010F PR ACE/ARB THEARPY RXD/TAKEN: ICD-10-PCS | Mod: HCNC,CPTII,S$GLB, | Performed by: NURSE PRACTITIONER

## 2023-07-25 PROCEDURE — 1101F PR PT FALLS ASSESS DOC 0-1 FALLS W/OUT INJ PAST YR: ICD-10-PCS | Mod: HCNC,CPTII,S$GLB, | Performed by: NURSE PRACTITIONER

## 2023-07-25 PROCEDURE — 3079F PR MOST RECENT DIASTOLIC BLOOD PRESSURE 80-89 MM HG: ICD-10-PCS | Mod: HCNC,CPTII,S$GLB, | Performed by: NURSE PRACTITIONER

## 2023-07-25 PROCEDURE — 1159F MED LIST DOCD IN RCRD: CPT | Mod: HCNC,CPTII,S$GLB, | Performed by: NURSE PRACTITIONER

## 2023-07-25 PROCEDURE — 1101F PT FALLS ASSESS-DOCD LE1/YR: CPT | Mod: HCNC,CPTII,S$GLB, | Performed by: NURSE PRACTITIONER

## 2023-07-25 PROCEDURE — 99215 OFFICE O/P EST HI 40 MIN: CPT | Mod: HCNC,S$GLB,, | Performed by: NURSE PRACTITIONER

## 2023-07-25 PROCEDURE — 99215 PR OFFICE/OUTPT VISIT, EST, LEVL V, 40-54 MIN: ICD-10-PCS | Mod: HCNC,S$GLB,, | Performed by: NURSE PRACTITIONER

## 2023-07-25 PROCEDURE — 4010F ACE/ARB THERAPY RXD/TAKEN: CPT | Mod: HCNC,CPTII,S$GLB, | Performed by: NURSE PRACTITIONER

## 2023-07-25 PROCEDURE — 1160F PR REVIEW ALL MEDS BY PRESCRIBER/CLIN PHARMACIST DOCUMENTED: ICD-10-PCS | Mod: HCNC,CPTII,S$GLB, | Performed by: NURSE PRACTITIONER

## 2023-07-25 PROCEDURE — 3008F BODY MASS INDEX DOCD: CPT | Mod: HCNC,CPTII,S$GLB, | Performed by: NURSE PRACTITIONER

## 2023-07-25 PROCEDURE — 1160F RVW MEDS BY RX/DR IN RCRD: CPT | Mod: HCNC,CPTII,S$GLB, | Performed by: NURSE PRACTITIONER

## 2023-07-25 PROCEDURE — 3008F PR BODY MASS INDEX (BMI) DOCUMENTED: ICD-10-PCS | Mod: HCNC,CPTII,S$GLB, | Performed by: NURSE PRACTITIONER

## 2023-07-25 PROCEDURE — 3288F PR FALLS RISK ASSESSMENT DOCUMENTED: ICD-10-PCS | Mod: HCNC,CPTII,S$GLB, | Performed by: NURSE PRACTITIONER

## 2023-07-25 PROCEDURE — 99999 PR PBB SHADOW E&M-EST. PATIENT-LVL IV: CPT | Mod: PBBFAC,HCNC,, | Performed by: NURSE PRACTITIONER

## 2023-07-25 PROCEDURE — 99999 PR PBB SHADOW E&M-EST. PATIENT-LVL IV: ICD-10-PCS | Mod: PBBFAC,HCNC,, | Performed by: NURSE PRACTITIONER

## 2023-07-25 PROCEDURE — 1126F PR PAIN SEVERITY QUANTIFIED, NO PAIN PRESENT: ICD-10-PCS | Mod: HCNC,CPTII,S$GLB, | Performed by: NURSE PRACTITIONER

## 2023-07-25 PROCEDURE — 1126F AMNT PAIN NOTED NONE PRSNT: CPT | Mod: HCNC,CPTII,S$GLB, | Performed by: NURSE PRACTITIONER

## 2023-07-25 PROCEDURE — 3074F SYST BP LT 130 MM HG: CPT | Mod: HCNC,CPTII,S$GLB, | Performed by: NURSE PRACTITIONER

## 2023-07-25 PROCEDURE — 3079F DIAST BP 80-89 MM HG: CPT | Mod: HCNC,CPTII,S$GLB, | Performed by: NURSE PRACTITIONER

## 2023-07-25 PROCEDURE — 1159F PR MEDICATION LIST DOCUMENTED IN MEDICAL RECORD: ICD-10-PCS | Mod: HCNC,CPTII,S$GLB, | Performed by: NURSE PRACTITIONER

## 2023-07-25 PROCEDURE — 3288F FALL RISK ASSESSMENT DOCD: CPT | Mod: HCNC,CPTII,S$GLB, | Performed by: NURSE PRACTITIONER

## 2023-07-25 NOTE — PROGRESS NOTES
"PATIENT: Tegan Alarcon  MRN: [id number]  DATE: 7/25/2023    Chief Complaint: Survivorship Clinic    Subjective:   Oncology History: Ms. Alarcon is a 67 y.o. female  with history of breast cancer who presents for survivorship clinic visit.    Survivorship Assessment:  1. Cardiac Toxicity-None  2. Anxiety/Depression/Distress-She denies. She reports a fantastic support system.   3. Cognitive function-Denies any difficulty  4. Fatigue-None  5. Lymphedema-None. She has a sleeve and will wear it when she flies.   6. Sexual Function/Hormone related symptoms- Vaginal dryness, painful intercourse  7. Pain-She denies pain, but reports she will get "nerve ending discomfort to the breast and it doesn't last long."   8. Sleep-She reports she got a new matress and is now sleeping through the night, 8-9 hours.   9. Exercise/Dietary Assessment: She reports a good appetite and eats healthy. She is walking/biking 30 minutes 4 times a week. She also does her PT exercises 2 days a week along with other "toning" exercises.     PCP: Kari Miguel  GYN: Dr. Kayla Wall    Last Colonoscopy: 2020, due in 5 years  Last WWE/Pelvic Exam: 2/9/2023    CVD Risk Assessment:  The 10-year ASCVD risk score (Nba DOWNING, et al., 2019) is: 7.8%    Values used to calculate the score:      Age: 67 years      Sex: Female      Is Non- : No      Diabetic: No      Tobacco smoker: No      Systolic Blood Pressure: 133 mmHg      Is BP treated: Yes      HDL Cholesterol: 64 mg/dL      Total Cholesterol: 216 mg/dL    Past Medical History:   Past Medical History:   Diagnosis Date    Bone disorder     pre osteoporosis    Cancer 2022    breast    Hypertension     diagnosed 6/2014    PONV (postoperative nausea and vomiting)      Past Surgical History:   Past Surgical History:   Procedure Laterality Date    breast biopsies      BREAST BIOPSY      BREAST RECONSTRUCTION Bilateral 3/8/2023    Procedure: RECONSTRUCTION, BREAST - Immediate to Implant;  " Surgeon: Guillermo Hemphill MD;  Location: [facility] OR;  Service: Plastics;  Laterality: Bilateral;    BREAST SURGERY      COLONOSCOPY N/A 10/09/2020    Procedure: COLONOSCOPY;  Surgeon: Surinder Conley MD;  Location: [facility] ENDO;  Service: Endoscopy;  Laterality: N/A;    EYE SURGERY Left 2018    lesion on eyelid    INSERTION OF BREAST IMPLANT Bilateral 3/8/2023    Procedure: INSERTION, BREAST IMPLANT;  Surgeon: Guillermo Hemphill MD;  Location: [facility] OR;  Service: Plastics;  Laterality: Bilateral;    PLACEMENT OF ACELLULAR HUMAN DERMAL ALLOGRAFT Bilateral 3/8/2023    Procedure: APPLICATION, ACELLULAR HUMAN DERMAL ALLOGRAFT;  Surgeon: Guillermo Hemphill MD;  Location: [facility] OR;  Service: Plastics;  Laterality: Bilateral;    SENTINEL LYMPH NODE BIOPSY Left 3/8/2023    Procedure: BIOPSY, LYMPH NODE, SENTINEL;  Surgeon: Rosa Odonnell MD;  Location: [facility] OR;  Service: General;  Laterality: Left;    SIMPLE MASTECTOMY Bilateral 3/8/2023    Procedure: MASTECTOMY, SIMPLE;  Surgeon: Rosa Odonnell MD;  Location: [facility] OR;  Service: General;  Laterality: Bilateral;    TONSILLECTOMY      TOTAL ABDOMINAL HYSTERECTOMY W/ BILATERAL SALPINGOOPHORECTOMY       Family History:   Family History   Problem Relation Age of Onset    Breast cancer Mother 45         at 88    Heart disease Father     Hypertension Father     Breast cancer Maternal Aunt     Breast cancer Maternal Aunt     Breast cancer Maternal Aunt     Hypertension Paternal Grandmother     Stroke Paternal Grandmother     Breast cancer Cousin     Breast cancer Cousin     Breast cancer Other         cousins/several aunts     Social History:  reports that she has never smoked. She has never used smokeless tobacco. She reports that she does not drink alcohol and does not use drugs.    Allergies:  Review of patient's allergies indicates:  No Known Allergies    Medications:  Current Outpatient Medications   Medication Sig Dispense Refill    acetaminophen (TYLENOL) 500 MG  tablet Take 500 mg by mouth every 6 (six) hours as needed for Pain. Pt states she takes 500mg BID for arm pain. One in AM and one at [facility]       calcium carbonate (OS-BARB) 500 mg calcium (1,250 mg) tablet Take 1 tablet by mouth 2 (two) times daily.      cholecalciferol, vitamin D3, (VITAMIN D3) 50 mcg (2,000 unit) Cap capsule Take 2,000 Units by mouth Daily. Indications: prevention of vitamin D deficiency      ibuprofen (ADVIL,MOTRIN) 200 MG tablet Take 200 mg by mouth every 6 (six) hours as needed for Pain (alternate with tylenol). Indications: pain      lisinopriL 10 MG tablet Take 1 tablet (10 mg total) by mouth once daily. 90 tablet 1    magnesium 250 mg Tab Take 1 tablet by mouth nightly as needed (insomnia).      multivitamin capsule Take 1 capsule by mouth once daily.      mupirocin (BACTROBAN) 2 % ointment APPLY TO EACH NOSTRIL TWICE DAILY FOR 5 DAYS      oxyCODONE-acetaminophen (PERCOCET) 7.5-325 mg per tablet Take 1 tablet by mouth every 6 (six) hours as needed for Pain. take 1-2 tablet  Indications: moderate post op pain      polyethylene glycol (GLYCOLAX) 17 gram/dose powder Take 17 g by mouth Daily. Indications: constipation      tamoxifen (NOLVADEX) 20 MG Tab Take 1 tablet (20 mg total) by mouth once daily. 90 tablet 6     No current facility-administered medications for this visit.     Facility-Administered Medications Ordered in Other Visits   Medication Dose Route Frequency Provider Last Rate Last Admin    lactated ringers infusion   Intravenous Continuous Armand Mitchell MD   Stopped at 03/08/23 1293        Review of Systems:  Review of Systems   Constitutional:         Vaginal dryness   HENT: Negative.     Eyes: Negative.    Respiratory: Negative.     Cardiovascular: Negative.    Gastrointestinal: Negative.    Genitourinary: Negative.    Musculoskeletal: Negative.    Skin: Negative.    Neurological: Negative.    Endo/Heme/Allergies: Negative.    Psychiatric/Behavioral: Negative.     "    Objective:      Vitals:   Vitals:    07/25/23 1101   BP: 129/83   Pulse: 82   Temp: 98.1 °F (36.7 °C)   TempSrc: Temporal   SpO2: 97%   Weight: 72.3 kg (159 lb 8 oz)   Height: 5' 4" (1.626 m)   BMI: Body mass index is 27.38 kg/m².    Physical Exam:     Physical Exam  Vitals reviewed.   Constitutional:       Appearance: Normal appearance.   Neurological:      Mental Status: She is alert.   Psychiatric:         Mood and Affect: Mood normal.         Behavior: Behavior normal.        Assessment:     No diagnosis found.       Plan:   Reviewed Cancer Treatment Summary with patient. Care Plan was given to the patient and all questions were answered. Survivorship handout was also reviewed and given to patient.   Counseled on healthy lifestyle and behavior modifications that could reduce risk of recurrence.  Limit alcohol to less than one drink per day, Exercise at least 150 minutes per week of moderate intensity aerobic activity or at least 75 minutes of vigorous activity, Maintaining a healthy weight, Limit red meat to no more than 2-3x per week, Reduce processed foods and meat. Also encouraged wide variety of fruits and vegetables    Recommended Good Clean Love: BioNourish and Almost Naked  [facility] Recommendation for given  Follow ups with Breast Surgery and Medical Oncology Scheduled.  Follow up with Survivorship clinic PRN.    I spent a total of 65 minutes on the day of the visit.This includes face to face time and non-face to face time preparing to see the patient (eg, review of tests), obtaining and/or reviewing separately obtained history, documenting clinical information in the electronic or other health record, independently interpreting results and communicating results to the patient/family/caregiver, or care coordinator.      "

## 2023-08-21 RX ORDER — LISINOPRIL 10 MG/1
10 TABLET ORAL
Qty: 90 TABLET | Refills: 1 | Status: SHIPPED | OUTPATIENT
Start: 2023-08-21 | End: 2023-10-24 | Stop reason: SDUPTHER

## 2023-08-21 NOTE — TELEPHONE ENCOUNTER
Refill Decision Note   Tegan Alarcon  is requesting a refill authorization.  Brief Assessment and Rationale for Refill:  Approve     Medication Therapy Plan:         Comments:     Note composed:10:33 AM 08/21/2023             Appointments     Last Visit   2/14/2023 Kari Miguel MD   Next Visit   Visit date not found Kari Miguel MD

## 2023-08-21 NOTE — TELEPHONE ENCOUNTER
No care due was identified.  [facility] Embedded Care Due Messages. Reference number: [id number].   8/21/2023 3:23:52 AM CDT

## 2023-10-11 ENCOUNTER — PATIENT MESSAGE (OUTPATIENT)
Dept: HEMATOLOGY/ONCOLOGY | Facility: CLINIC | Age: 68
End: 2023-10-11
Payer: MEDICARE

## 2023-10-24 ENCOUNTER — OFFICE VISIT (OUTPATIENT)
Dept: FAMILY MEDICINE | Facility: CLINIC | Age: 68
End: 2023-10-24
Payer: MEDICARE

## 2023-10-24 VITALS
BODY MASS INDEX: 26.93 KG/M2 | WEIGHT: 157.75 LBS | OXYGEN SATURATION: 98 % | HEIGHT: 64 IN | DIASTOLIC BLOOD PRESSURE: 82 MMHG | HEART RATE: 81 BPM | SYSTOLIC BLOOD PRESSURE: 128 MMHG

## 2023-10-24 DIAGNOSIS — Z13.6 ENCOUNTER FOR SCREENING FOR CARDIOVASCULAR DISORDERS: ICD-10-CM

## 2023-10-24 DIAGNOSIS — I10 PRIMARY HYPERTENSION: ICD-10-CM

## 2023-10-24 DIAGNOSIS — E78.5 HYPERLIPIDEMIA, UNSPECIFIED HYPERLIPIDEMIA TYPE: ICD-10-CM

## 2023-10-24 DIAGNOSIS — Z79.899 HIGH RISK MEDICATIONS (NOT ANTICOAGULANTS) LONG-TERM USE: ICD-10-CM

## 2023-10-24 DIAGNOSIS — Z78.0 POSTMENOPAUSAL STATUS: ICD-10-CM

## 2023-10-24 DIAGNOSIS — R91.1 SOLITARY PULMONARY NODULE: ICD-10-CM

## 2023-10-24 DIAGNOSIS — C50.912 INVASIVE DUCTAL CARCINOMA OF BREAST, FEMALE, LEFT: ICD-10-CM

## 2023-10-24 DIAGNOSIS — Z00.00 ROUTINE GENERAL MEDICAL EXAMINATION AT A HEALTH CARE FACILITY: Primary | ICD-10-CM

## 2023-10-24 DIAGNOSIS — Z91.89 AT RISK FOR LYMPHEDEMA: ICD-10-CM

## 2023-10-24 PROCEDURE — 1159F PR MEDICATION LIST DOCUMENTED IN MEDICAL RECORD: ICD-10-PCS | Mod: HCNC,CPTII,S$GLB, | Performed by: FAMILY MEDICINE

## 2023-10-24 PROCEDURE — 1101F PT FALLS ASSESS-DOCD LE1/YR: CPT | Mod: HCNC,CPTII,S$GLB, | Performed by: FAMILY MEDICINE

## 2023-10-24 PROCEDURE — 3288F PR FALLS RISK ASSESSMENT DOCUMENTED: ICD-10-PCS | Mod: HCNC,CPTII,S$GLB, | Performed by: FAMILY MEDICINE

## 2023-10-24 PROCEDURE — 4010F ACE/ARB THERAPY RXD/TAKEN: CPT | Mod: HCNC,CPTII,S$GLB, | Performed by: FAMILY MEDICINE

## 2023-10-24 PROCEDURE — 3079F DIAST BP 80-89 MM HG: CPT | Mod: HCNC,CPTII,S$GLB, | Performed by: FAMILY MEDICINE

## 2023-10-24 PROCEDURE — 1159F MED LIST DOCD IN RCRD: CPT | Mod: HCNC,CPTII,S$GLB, | Performed by: FAMILY MEDICINE

## 2023-10-24 PROCEDURE — 1126F PR PAIN SEVERITY QUANTIFIED, NO PAIN PRESENT: ICD-10-PCS | Mod: HCNC,CPTII,S$GLB, | Performed by: FAMILY MEDICINE

## 2023-10-24 PROCEDURE — 99397 PR PREVENTIVE VISIT,EST,65 & OVER: ICD-10-PCS | Mod: 25,HCNC,S$GLB, | Performed by: FAMILY MEDICINE

## 2023-10-24 PROCEDURE — 3074F SYST BP LT 130 MM HG: CPT | Mod: HCNC,CPTII,S$GLB, | Performed by: FAMILY MEDICINE

## 2023-10-24 PROCEDURE — 1126F AMNT PAIN NOTED NONE PRSNT: CPT | Mod: HCNC,CPTII,S$GLB, | Performed by: FAMILY MEDICINE

## 2023-10-24 PROCEDURE — 1160F PR REVIEW ALL MEDS BY PRESCRIBER/CLIN PHARMACIST DOCUMENTED: ICD-10-PCS | Mod: HCNC,CPTII,S$GLB, | Performed by: FAMILY MEDICINE

## 2023-10-24 PROCEDURE — 3008F BODY MASS INDEX DOCD: CPT | Mod: HCNC,CPTII,S$GLB, | Performed by: FAMILY MEDICINE

## 2023-10-24 PROCEDURE — 99999 PR PBB SHADOW E&M-EST. PATIENT-LVL IV: CPT | Mod: PBBFAC,HCNC,, | Performed by: FAMILY MEDICINE

## 2023-10-24 PROCEDURE — 3008F PR BODY MASS INDEX (BMI) DOCUMENTED: ICD-10-PCS | Mod: HCNC,CPTII,S$GLB, | Performed by: FAMILY MEDICINE

## 2023-10-24 PROCEDURE — 1160F RVW MEDS BY RX/DR IN RCRD: CPT | Mod: HCNC,CPTII,S$GLB, | Performed by: FAMILY MEDICINE

## 2023-10-24 PROCEDURE — 3074F PR MOST RECENT SYSTOLIC BLOOD PRESSURE < 130 MM HG: ICD-10-PCS | Mod: HCNC,CPTII,S$GLB, | Performed by: FAMILY MEDICINE

## 2023-10-24 PROCEDURE — 3288F FALL RISK ASSESSMENT DOCD: CPT | Mod: HCNC,CPTII,S$GLB, | Performed by: FAMILY MEDICINE

## 2023-10-24 PROCEDURE — 99397 PER PM REEVAL EST PAT 65+ YR: CPT | Mod: 25,HCNC,S$GLB, | Performed by: FAMILY MEDICINE

## 2023-10-24 PROCEDURE — 1101F PR PT FALLS ASSESS DOC 0-1 FALLS W/OUT INJ PAST YR: ICD-10-PCS | Mod: HCNC,CPTII,S$GLB, | Performed by: FAMILY MEDICINE

## 2023-10-24 PROCEDURE — 99999 PR PBB SHADOW E&M-EST. PATIENT-LVL IV: ICD-10-PCS | Mod: PBBFAC,HCNC,, | Performed by: FAMILY MEDICINE

## 2023-10-24 PROCEDURE — 4010F PR ACE/ARB THEARPY RXD/TAKEN: ICD-10-PCS | Mod: HCNC,CPTII,S$GLB, | Performed by: FAMILY MEDICINE

## 2023-10-24 PROCEDURE — 3079F PR MOST RECENT DIASTOLIC BLOOD PRESSURE 80-89 MM HG: ICD-10-PCS | Mod: HCNC,CPTII,S$GLB, | Performed by: FAMILY MEDICINE

## 2023-10-24 RX ORDER — LISINOPRIL 10 MG/1
10 TABLET ORAL DAILY
Qty: 90 TABLET | Refills: 3 | Status: SHIPPED | OUTPATIENT
Start: 2023-10-24

## 2023-10-24 NOTE — PROGRESS NOTES
Subjective:       Patient ID: Tegan Alarcon is a 68 y.o. female.    Chief Complaint: Annual Exam      Tegan Alarcon is in the office for annual exam.    HPI  Since lov, had double mastectomy re L breast cancer.   Past Medical History:   Diagnosis Date    Bone disorder     pre osteoporosis    Cancer 2022    breast    Hypertension     diagnosed 6/2014    PONV (postoperative nausea and vomiting)      Re lymphedema, has a sleeve to use on the L as needed (flying).   She has both a daily and prn to use for vaginal dryness.   Sleeping better with new mattress, still using mastectomy pillow.     Had a fall while walking, her ankle rolled, last week.     Current Outpatient Medications:     calcium carbonate (OS-BARB) 500 mg calcium (1,250 mg) tablet, Take 1 tablet by mouth 2 (two) times daily., Disp: , Rfl:     cholecalciferol, vitamin D3, (VITAMIN D3) 50 mcg (2,000 unit) Cap capsule, Take 2,000 Units by mouth Daily. Indications: prevention of vitamin D deficiency, Disp: , Rfl:     magnesium 250 mg Tab, Take 1 tablet by mouth nightly as needed (insomnia)., Disp: , Rfl:     multivitamin capsule, Take 1 capsule by mouth once daily., Disp: , Rfl:     polyethylene glycol (GLYCOLAX) 17 gram/dose powder, Take 17 g by mouth Daily. Indications: constipation, Disp: , Rfl:     tamoxifen (NOLVADEX) 20 MG Tab, Take 1 tablet (20 mg total) by mouth once daily., Disp: 90 tablet, Rfl: 6    acetaminophen (TYLENOL) 500 MG tablet, Take 500 mg by mouth every 6 (six) hours as needed for Pain. Pt states she takes 500mg BID for arm pain. One in AM and one at [facility] , Disp: , Rfl:     lisinopriL 10 MG tablet, Take 1 tablet (10 mg total) by mouth once daily., Disp: 90 tablet, Rfl: 3    mupirocin (BACTROBAN) 2 % ointment, APPLY TO EACH NOSTRIL TWICE DAILY FOR 5 DAYS, Disp: , Rfl:   No current facility-administered medications for this visit.    Facility-Administered Medications Ordered in Other Visits:     lactated ringers infusion, , Intravenous,  Paula Garcia Thomas A., MD, Stopped at 03/08/23 1830    The 10-year ASCVD risk score (Nba DK, et al., 2019) is: 8.1%    Values used to calculate the score:      Age: 68 years      Sex: Female      Is Non- : No      Diabetic: No      Tobacco smoker: No      Systolic Blood Pressure: 128 mmHg      Is BP treated: Yes      HDL Cholesterol: 64 mg/dL      Total Cholesterol: 216 mg/dL     Lab Results   Component Value Date    HGBA1C 5.3 09/15/2022    HGBA1C 5.1 10/18/2016     Lab Results   Component Value Date    LDLCALC 121.4 02/06/2023    CREATININE 0.88 03/08/2023   Labs 2023 rev.     Review of Systems   Constitutional:  Negative for fatigue and unexpected weight change.   HENT:  Negative for congestion, postnasal drip, rhinorrhea, sore throat and trouble swallowing.    Eyes:  Negative for redness and visual disturbance.        Exam up to date   Respiratory:  Negative for apnea (+baby snoring, no reported apnea), cough (occ) and shortness of breath.    Cardiovascular:  Negative for chest pain and leg swelling.   Gastrointestinal:  Negative for abdominal pain, blood in stool and constipation (improved with miralax 1-2x/week).   Genitourinary:  Negative for difficulty urinating, dysuria and frequency (nighttime x 0-1).   Musculoskeletal:  Negative for arthralgias, back pain and myalgias.        Walking 2mi, 3x/week.   R shoulder - previous PT.   Skin:  Negative for color change and rash.        Saw derm/grieshaber for f/u.   Neurological:  Negative for dizziness, light-headedness and headaches.   Psychiatric/Behavioral:  Negative for dysphoric mood and sleep disturbance (magnesium helps). The patient is not nervous/anxious.        Objective:      Physical Exam  Vitals and nursing note reviewed.   Constitutional:       General: She is not in acute distress.     Appearance: Normal appearance. She is well-developed.   HENT:      Head: Normocephalic and atraumatic.      Right Ear: Tympanic  membrane and external ear normal.      Left Ear: Tympanic membrane and external ear normal.      Nose: Nose normal.      Mouth/Throat:      Pharynx: No oropharyngeal exudate.   Eyes:      Conjunctiva/sclera: Conjunctivae normal.      Pupils: Pupils are equal, round, and reactive to light.   Neck:      Thyroid: No thyromegaly.   Cardiovascular:      Rate and Rhythm: Normal rate and regular rhythm.   Pulmonary:      Effort: Pulmonary effort is normal. No respiratory distress.      Breath sounds: Normal breath sounds. No wheezing.   Abdominal:      General: Bowel sounds are normal. There is no distension.      Palpations: Abdomen is soft. There is no mass.      Tenderness: There is no abdominal tenderness. There is no guarding or rebound.   Musculoskeletal:         General: No signs of injury.      Cervical back: Neck supple.      Right lower leg: No edema.      Left lower leg: No edema.   Lymphadenopathy:      Cervical: No cervical adenopathy.   Skin:     General: Skin is warm and dry.   Neurological:      General: No focal deficit present.      Mental Status: She is alert and oriented to person, place, and time.      Cranial Nerves: No cranial nerve deficit.   Psychiatric:         Mood and Affect: Mood normal.         Behavior: Behavior normal.             Screening recommendations appropriate to age and health status were reviewed.    Assessment & Plan:    Routine general medical examination at a health care facility  Comments:  anticipatory guidance reviewed  Orders:  -     CBC Without Differential; Future; Expected date: 10/24/2023  -     Comprehensive Metabolic Panel; Future; Expected date: 10/24/2023  -     Hemoglobin A1C; Future; Expected date: 10/24/2023  -     Lipid Panel; Future; Expected date: 10/24/2023  -     Urinalysis, Reflex to Urine Culture Urine, Clean Catch; Future  -     TSH; Future; Expected date: 10/24/2023  -     Vitamin B12; Future; Expected date: 10/24/2023  -     Iron; Future; Expected date:  10/24/2023    Postmenopausal status  Comments:  cont ca/d, encouraged weight-bearing exercise  Orders:  -     DXA Bone Density Axial Skeleton 1 or more sites; Future; Expected date: 10/24/2023    Invasive ductal carcinoma of breast, female, left  Comments:  doing well post-op, cont f/u with oncology  tamoxifen (year 1/5)  Orders:  -     Iron; Future; Expected date: 10/24/2023    Hyperlipidemia, unspecified hyperlipidemia type  -     CBC Without Differential; Future; Expected date: 10/24/2023  -     Comprehensive Metabolic Panel; Future; Expected date: 10/24/2023  -     Hemoglobin A1C; Future; Expected date: 10/24/2023  -     Lipid Panel; Future; Expected date: 10/24/2023  -     Urinalysis, Reflex to Urine Culture Urine, Clean Catch; Future  -     TSH; Future; Expected date: 10/24/2023  -     Vitamin B12; Future; Expected date: 10/24/2023  -     Iron; Future; Expected date: 10/24/2023    Primary hypertension  Comments:  controlled, cont regimen  Orders:  -     lisinopriL 10 MG tablet; Take 1 tablet (10 mg total) by mouth once daily.  Dispense: 90 tablet; Refill: 3  -     Echo; Future    High risk medications (not anticoagulants) long-term use  -     CBC Without Differential; Future; Expected date: 10/24/2023  -     Comprehensive Metabolic Panel; Future; Expected date: 10/24/2023  -     Hemoglobin A1C; Future; Expected date: 10/24/2023  -     Lipid Panel; Future; Expected date: 10/24/2023  -     Urinalysis, Reflex to Urine Culture Urine, Clean Catch; Future  -     TSH; Future; Expected date: 10/24/2023  -     Vitamin B12; Future; Expected date: 10/24/2023  -     Iron; Future; Expected date: 10/24/2023  -     Echo; Future    At risk for lymphedema  Comments:  sleeve prn    Encounter for screening for cardiovascular disorders  -     CT Cardiac Scoring; Future; Expected date: 10/24/2023  -     Echo; Future    Solitary pulmonary nodule  Comments:  update ct chest  Orders:  -     CT Chest Without Contrast; Future; Expected  date: 10/24/2023

## 2023-11-02 ENCOUNTER — LAB VISIT (OUTPATIENT)
Dept: LAB | Facility: HOSPITAL | Age: 68
End: 2023-11-02
Attending: FAMILY MEDICINE
Payer: MEDICARE

## 2023-11-02 DIAGNOSIS — Z79.899 HIGH RISK MEDICATIONS (NOT ANTICOAGULANTS) LONG-TERM USE: ICD-10-CM

## 2023-11-02 DIAGNOSIS — E78.5 HYPERLIPIDEMIA, UNSPECIFIED HYPERLIPIDEMIA TYPE: ICD-10-CM

## 2023-11-02 DIAGNOSIS — C50.912 INVASIVE DUCTAL CARCINOMA OF BREAST, FEMALE, LEFT: ICD-10-CM

## 2023-11-02 DIAGNOSIS — Z00.00 ROUTINE GENERAL MEDICAL EXAMINATION AT A HEALTH CARE FACILITY: ICD-10-CM

## 2023-11-02 LAB
ALBUMIN SERPL BCP-MCNC: 4 G/DL (ref 3.5–5.2)
ALP SERPL-CCNC: 45 U/L (ref 55–135)
ALT SERPL W/O P-5'-P-CCNC: 32 U/L (ref 10–44)
ANION GAP SERPL CALC-SCNC: 13 MMOL/L (ref 8–16)
AST SERPL-CCNC: 27 U/L (ref 10–40)
BILIRUB SERPL-MCNC: 0.3 MG/DL (ref 0.1–1)
BUN SERPL-MCNC: 23 MG/DL (ref 8–23)
CALCIUM SERPL-MCNC: 9.4 MG/DL (ref 8.7–10.5)
CHLORIDE SERPL-SCNC: 104 MMOL/L (ref 95–110)
CHOLEST SERPL-MCNC: 164 MG/DL (ref 120–199)
CHOLEST/HDLC SERPL: 3.3 {RATIO} (ref 2–5)
CO2 SERPL-SCNC: 22 MMOL/L (ref 23–29)
CREAT SERPL-MCNC: 1 MG/DL (ref 0.5–1.4)
ERYTHROCYTE [DISTWIDTH] IN BLOOD BY AUTOMATED COUNT: 12.8 % (ref 11.5–14.5)
EST. GFR  (NO RACE VARIABLE): >60 ML/MIN/1.73 M^2
ESTIMATED AVG GLUCOSE: 105 MG/DL (ref 68–131)
GLUCOSE SERPL-MCNC: 89 MG/DL (ref 70–110)
HBA1C MFR BLD: 5.3 % (ref 4–5.6)
HCT VFR BLD AUTO: 43.3 % (ref 37–48.5)
HDLC SERPL-MCNC: 50 MG/DL (ref 40–75)
HDLC SERPL: 30.5 % (ref 20–50)
HGB BLD-MCNC: 14.5 G/DL (ref 12–16)
IRON SERPL-MCNC: 141 UG/DL (ref 30–160)
LDLC SERPL CALC-MCNC: 64.6 MG/DL (ref 63–159)
MCH RBC QN AUTO: 30.9 PG (ref 27–31)
MCHC RBC AUTO-ENTMCNC: 33.5 G/DL (ref 32–36)
MCV RBC AUTO: 92 FL (ref 82–98)
NONHDLC SERPL-MCNC: 114 MG/DL
PLATELET # BLD AUTO: 238 K/UL (ref 150–450)
PMV BLD AUTO: 11.6 FL (ref 9.2–12.9)
POTASSIUM SERPL-SCNC: 4.3 MMOL/L (ref 3.5–5.1)
PROT SERPL-MCNC: 7.4 G/DL (ref 6–8.4)
RBC # BLD AUTO: 4.7 M/UL (ref 4–5.4)
SODIUM SERPL-SCNC: 139 MMOL/L (ref 136–145)
TRIGL SERPL-MCNC: 247 MG/DL (ref 30–150)
TSH SERPL DL<=0.005 MIU/L-ACNC: 1.54 UIU/ML (ref 0.4–4)
VIT B12 SERPL-MCNC: 963 PG/ML (ref 210–950)
WBC # BLD AUTO: 6.51 K/UL (ref 3.9–12.7)

## 2023-11-02 PROCEDURE — 83036 HEMOGLOBIN GLYCOSYLATED A1C: CPT | Mod: HCNC | Performed by: FAMILY MEDICINE

## 2023-11-02 PROCEDURE — 85027 COMPLETE CBC AUTOMATED: CPT | Mod: HCNC | Performed by: FAMILY MEDICINE

## 2023-11-02 PROCEDURE — 83540 ASSAY OF IRON: CPT | Mod: HCNC | Performed by: FAMILY MEDICINE

## 2023-11-02 PROCEDURE — 36415 COLL VENOUS BLD VENIPUNCTURE: CPT | Mod: HCNC,PN | Performed by: FAMILY MEDICINE

## 2023-11-02 PROCEDURE — 80053 COMPREHEN METABOLIC PANEL: CPT | Mod: HCNC | Performed by: FAMILY MEDICINE

## 2023-11-02 PROCEDURE — 80061 LIPID PANEL: CPT | Mod: HCNC | Performed by: FAMILY MEDICINE

## 2023-11-02 PROCEDURE — 82607 VITAMIN B-12: CPT | Mod: HCNC | Performed by: FAMILY MEDICINE

## 2023-11-02 PROCEDURE — 84443 ASSAY THYROID STIM HORMONE: CPT | Mod: HCNC | Performed by: FAMILY MEDICINE

## 2023-11-13 ENCOUNTER — OFFICE VISIT (OUTPATIENT)
Dept: HEMATOLOGY/ONCOLOGY | Facility: CLINIC | Age: 68
End: 2023-11-13
Payer: MEDICARE

## 2023-11-13 VITALS
SYSTOLIC BLOOD PRESSURE: 132 MMHG | HEART RATE: 80 BPM | HEIGHT: 64 IN | RESPIRATION RATE: 20 BRPM | OXYGEN SATURATION: 100 % | TEMPERATURE: 97 F | DIASTOLIC BLOOD PRESSURE: 76 MMHG | BODY MASS INDEX: 27.06 KG/M2 | WEIGHT: 158.5 LBS

## 2023-11-13 DIAGNOSIS — Z79.810 LONG-TERM CURRENT USE OF TAMOXIFEN: ICD-10-CM

## 2023-11-13 DIAGNOSIS — C50.912 INVASIVE DUCTAL CARCINOMA OF LEFT BREAST IN FEMALE: Primary | ICD-10-CM

## 2023-11-13 DIAGNOSIS — M85.80 OSTEOPENIA, UNSPECIFIED LOCATION: ICD-10-CM

## 2023-11-13 PROCEDURE — 3044F PR MOST RECENT HEMOGLOBIN A1C LEVEL <7.0%: ICD-10-PCS | Mod: HCNC,CPTII,S$GLB, | Performed by: INTERNAL MEDICINE

## 2023-11-13 PROCEDURE — 99999 PR PBB SHADOW E&M-EST. PATIENT-LVL III: ICD-10-PCS | Mod: PBBFAC,HCNC,, | Performed by: INTERNAL MEDICINE

## 2023-11-13 PROCEDURE — 3288F PR FALLS RISK ASSESSMENT DOCUMENTED: ICD-10-PCS | Mod: HCNC,CPTII,S$GLB, | Performed by: INTERNAL MEDICINE

## 2023-11-13 PROCEDURE — 3008F PR BODY MASS INDEX (BMI) DOCUMENTED: ICD-10-PCS | Mod: HCNC,CPTII,S$GLB, | Performed by: INTERNAL MEDICINE

## 2023-11-13 PROCEDURE — 3078F DIAST BP <80 MM HG: CPT | Mod: HCNC,CPTII,S$GLB, | Performed by: INTERNAL MEDICINE

## 2023-11-13 PROCEDURE — 3078F PR MOST RECENT DIASTOLIC BLOOD PRESSURE < 80 MM HG: ICD-10-PCS | Mod: HCNC,CPTII,S$GLB, | Performed by: INTERNAL MEDICINE

## 2023-11-13 PROCEDURE — 3008F BODY MASS INDEX DOCD: CPT | Mod: HCNC,CPTII,S$GLB, | Performed by: INTERNAL MEDICINE

## 2023-11-13 PROCEDURE — 99215 PR OFFICE/OUTPT VISIT, EST, LEVL V, 40-54 MIN: ICD-10-PCS | Mod: HCNC,S$GLB,, | Performed by: INTERNAL MEDICINE

## 2023-11-13 PROCEDURE — 3044F HG A1C LEVEL LT 7.0%: CPT | Mod: HCNC,CPTII,S$GLB, | Performed by: INTERNAL MEDICINE

## 2023-11-13 PROCEDURE — 4010F ACE/ARB THERAPY RXD/TAKEN: CPT | Mod: HCNC,CPTII,S$GLB, | Performed by: INTERNAL MEDICINE

## 2023-11-13 PROCEDURE — 99999 PR PBB SHADOW E&M-EST. PATIENT-LVL III: CPT | Mod: PBBFAC,HCNC,, | Performed by: INTERNAL MEDICINE

## 2023-11-13 PROCEDURE — 3288F FALL RISK ASSESSMENT DOCD: CPT | Mod: HCNC,CPTII,S$GLB, | Performed by: INTERNAL MEDICINE

## 2023-11-13 PROCEDURE — 1101F PR PT FALLS ASSESS DOC 0-1 FALLS W/OUT INJ PAST YR: ICD-10-PCS | Mod: HCNC,CPTII,S$GLB, | Performed by: INTERNAL MEDICINE

## 2023-11-13 PROCEDURE — 1101F PT FALLS ASSESS-DOCD LE1/YR: CPT | Mod: HCNC,CPTII,S$GLB, | Performed by: INTERNAL MEDICINE

## 2023-11-13 PROCEDURE — 1125F AMNT PAIN NOTED PAIN PRSNT: CPT | Mod: HCNC,CPTII,S$GLB, | Performed by: INTERNAL MEDICINE

## 2023-11-13 PROCEDURE — 3075F PR MOST RECENT SYSTOLIC BLOOD PRESS GE 130-139MM HG: ICD-10-PCS | Mod: HCNC,CPTII,S$GLB, | Performed by: INTERNAL MEDICINE

## 2023-11-13 PROCEDURE — 3075F SYST BP GE 130 - 139MM HG: CPT | Mod: HCNC,CPTII,S$GLB, | Performed by: INTERNAL MEDICINE

## 2023-11-13 PROCEDURE — 1125F PR PAIN SEVERITY QUANTIFIED, PAIN PRESENT: ICD-10-PCS | Mod: HCNC,CPTII,S$GLB, | Performed by: INTERNAL MEDICINE

## 2023-11-13 PROCEDURE — 4010F PR ACE/ARB THEARPY RXD/TAKEN: ICD-10-PCS | Mod: HCNC,CPTII,S$GLB, | Performed by: INTERNAL MEDICINE

## 2023-11-13 PROCEDURE — 99215 OFFICE O/P EST HI 40 MIN: CPT | Mod: HCNC,S$GLB,, | Performed by: INTERNAL MEDICINE

## 2023-11-13 NOTE — PROGRESS NOTES
PROGRESS NOTE    Subjective:       Patient ID: Tegan Alarcon is a 68 y.o. female.  MRN: [id number]  : 1955    Chief Complaint: IDC left breast    Stage I (T1b, N0, M0, Grade 1, ER+/TX+/HER2 pend)     History of Present Illness:   Tegan Alarcon is a 68 y.o. female who is referred for IDC of the left breast.       Has been undergoing screening mammograms. Has had benign cysts previously , drained with benign findings several years ago, more like 15 years ago. During that time, she was on Tamoxifen for prevention of recurrent cysts and took it for 5 years.     Did not notice any lump or breast mass. Abnormality was noted on screening mammogram this year.     Menarche at age 15 yrs old. .   Age at first live birth 32yrs old.   Did not breast feed.   LMP in . OCP-for about a year in her 20's.   Menopause at 44yrs old, had CINTHIA for prevention.  HRT-None    Family history cancer: Mother at 41yrs old, Maternal Aunt at 80yrs old, and Maternal Cousin at 40yrs old, Maternal cousin at 41yrs old had breast cancer. Genetics negative.     Other medical conditions include well controlled HTN and osteopenia.     Interim history:   S/p b/l mastectomy and implant based reconstruction.   Oncotype low, no adjuvant chemotherapy was recommended.     She started Tamoxifen 23, tolerating well.Mild and intermittent hot flashes.    Denies joint pains, has chronic right shoulder mobility issues without any exacerbation.     Has a new mattress and it is helping with back pain and she is sleeping better.     Oncology History:  22  Impression:  Left  Asymmetry: Left breast 10 mm asymmetry at the posterior 12 o'clock position. Assessment: 0 - Incomplete. Diagnostic Mammogram and/or Ultrasound is recommended.      Right  There is no mammographic evidence of malignancy in the right breast.     BI-RADS Category:   Overall: 0 - Incomplete: Needs Additional Imaging  Evaluation    11/7/22  Diagnostic mammogram  Impression:  Left  Mass: Left breast 10 mm x 7 mm x 7 mm mass at the posterior 11 o'clock position. Assessment: 4 - Suspicious finding. Biopsy is recommended.      BI-RADS Category:   Overall: 4 - Suspicious    12/7/22  Breast, left, mass at 11:00 5N, biopsy:   - Invasive ductal carcinoma   - Betty ndgndrndanddndend:nd nd2nd of 3        - Tubules: 2        - Nuclei: 2        - Mitosis: 1     - Tumor involves multiple core fragments and measures 7 mm in greatest linear   dimension   - Immunostain for p63 support the above interpretation   - Please see RECEPTOR STUDIES below   Dr. ROSSY Mosley has reviewed this case and agrees with the above   interpretation.     RECEPTOR STUDIES   Estrogen receptor:  Positive; strong nuclear staining in 95% of tumor cells   Progesterone receptor:  Positive; strong nuclear staining in 80% of tumor   cells   Her2:  Equivocal  (Stain score = 2+; additional testing for HER2 by FISH has   been requested and will be reported separately upon completion)   Ki-67:  15%     3/8/23  1.  BREAST, LEFT, MASTECTOMY:   --INVASIVE CARCINOMA OF NO SPECIAL TYPE (DUCTAL); BETTY HISTOLOGIC    GRADE 2 OUT OF 3, WITH FOCAL           MICROPAPILLARY FEATURES (APPROXIMATELY 5%).   --INVASIVE CARCINOMA MEASURES 11 MILLIMETERS IN MAXIMUM DIMENSION AND    IS PRESENT IN 11:00 REGION.        --BIOPSY SITE CHANGES.   --RECEPTOR STUDIES REPORTED PREVIOUSLY AT OUTSIDE FACILITY ON BIOPSY    MATERIAL (SEE COMMENT).        --NO LYMPHATIC AND/OR VASCULAR INVASION IDENTIFIED.   --PART 1 SPECIMEN POSTERIOR MARGIN IS POSITIVE FOR INVASIVE CARCINOMA;    ALL REMAINING PART 1   SPECIMEN MARGINS ARE NEGATIVE FOR INVASIVE CARCINOMA; ADDITIONAL    MARGINS SENT SEPARATELY           (SEE BELOW PARTS 2 AND 3).        --ATYPICAL LOBULAR HYPERPLASIA.        --MICROSCOPIC FIBROADENOMATOID NODULE.   --MICROCALCIFICATIONS PRESENT (PREDOMINANTLY IN NON-NEOPLASTIC TISSUE).        --pT1c/pN0.     2.   "BREAST, FURTHER DESIGNATED "DEEP MARGIN," EXCISION:        --NO MORPHOLOGIC EVIDENCE OF MALIGNANCY.        --HISTOMORPHOLOGICALLY UNREMARKABLE SKELETAL MUSCLE.     3. BREAST, LEFT, FURTHER DESIGNATED "ANTERIOR SUPERIOR MEDIAL MARGIN,"    EXCISION:        --NO MORPHOLOGIC EVIDENCE OF MALIGNANCY.     4.  LYMPH NODE, LEFT SENTINEL, EXCISIONAL BIOPSY:        --ONE LYMPH NODE WITH NO MORPHOLOGIC EVIDENCE OF MALIGNANCY (0/1).     5.  BREAST, RIGHT, MASTECTOMY:        --NO MORPHOLOGIC EVIDENCE OF MALIGNANCY.        --ATYPICAL LOBULAR HYPERPLASIA, MULTIFOCAL.   --FIBROCYSTIC CHANGES INCLUDING STROMAL FIBROSIS, CYSTIC DILATION OF    DUCTS, APOCRINE METAPLASIA,           AND FOCAL SCLEROSING ADENOSIS.        --FOCAL MICROCALCIFICATIONS ASSOCIATED WITH NON-NEOPLASTIC TISSUE.   History:  Past Medical History:   Diagnosis Date    Bone disorder     pre osteoporosis    Cancer 2022    breast    Hypertension     diagnosed 6/2014    PONV (postoperative nausea and vomiting)       Past Surgical History:   Procedure Laterality Date    breast biopsies      BREAST BIOPSY      BREAST RECONSTRUCTION Bilateral 3/8/2023    Procedure: RECONSTRUCTION, BREAST - Immediate to Implant;  Surgeon: Guillermo Hemphill MD;  Location: [facility] OR;  Service: Plastics;  Laterality: Bilateral;    BREAST SURGERY  2000    COLONOSCOPY N/A 10/09/2020    Procedure: COLONOSCOPY;  Surgeon: Surinder Conley MD;  Location: [facility] ENDO;  Service: Endoscopy;  Laterality: N/A;    EYE SURGERY Left 2018    lesion on eyelid    INSERTION OF BREAST IMPLANT Bilateral 3/8/2023    Procedure: INSERTION, BREAST IMPLANT;  Surgeon: Guillermo Hemphill MD;  Location: [facility] OR;  Service: Plastics;  Laterality: Bilateral;    PLACEMENT OF ACELLULAR HUMAN DERMAL ALLOGRAFT Bilateral 3/8/2023    Procedure: APPLICATION, ACELLULAR HUMAN DERMAL ALLOGRAFT;  Surgeon: Guillermo Hemphill MD;  Location: [facility] OR;  Service: Plastics;  Laterality: Bilateral;    SENTINEL LYMPH NODE BIOPSY Left 3/8/2023    Procedure: " BIOPSY, LYMPH NODE, SENTINEL;  Surgeon: Rosa Odonnell MD;  Location: [facility] OR;  Service: General;  Laterality: Left;    SIMPLE MASTECTOMY Bilateral 3/8/2023    Procedure: MASTECTOMY, SIMPLE;  Surgeon: Rosa Odonnell MD;  Location: [facility] OR;  Service: General;  Laterality: Bilateral;    TONSILLECTOMY      TOTAL ABDOMINAL HYSTERECTOMY W/ BILATERAL SALPINGOOPHORECTOMY       Family History   Problem Relation Age of Onset    Breast cancer Mother 45         at 88    Heart disease Father     Hypertension Father     Breast cancer Maternal Aunt     Breast cancer Maternal Aunt     Breast cancer Maternal Aunt     Hypertension Paternal Grandmother     Stroke Paternal Grandmother     Breast cancer Cousin     Breast cancer Cousin     Breast cancer Other         cousins/several aunts      Social History     Tobacco Use    Smoking status: Never    Smokeless tobacco: Never   Substance and Sexual Activity    Alcohol use: No     Comment: SOCIAL    Drug use: Never    Sexual activity: Yes     Partners: Male     Birth control/protection: None        ROS:   Review of Systems   Constitutional:  Negative for fever, malaise/fatigue and weight loss.   HENT:  Negative for congestion, hearing loss, nosebleeds and sore throat.    Eyes:  Negative for double vision and photophobia.   Respiratory:  Negative for cough, hemoptysis, sputum production, shortness of breath and wheezing.    Cardiovascular:  Negative for chest pain, palpitations, orthopnea and leg swelling.   Gastrointestinal:  Negative for abdominal pain, blood in stool, constipation, diarrhea, heartburn, nausea and vomiting.   Genitourinary:  Negative for dysuria, hematuria and urgency.   Musculoskeletal:  Negative for back pain, joint pain and myalgias.   Skin:  Negative for itching and rash.   Neurological:  Negative for dizziness, tingling, seizures, weakness and headaches.   Endo/Heme/Allergies:  Negative for polydipsia. Does not bruise/bleed easily.  "  Psychiatric/Behavioral:  Negative for depression and memory loss. The patient is not nervous/anxious and does not have insomnia.         Objective:     Vitals:    11/13/23 1439   BP: 132/76   Pulse: 80   Resp: 20   Temp: 96.7 °F (35.9 °C)   TempSrc: Temporal   SpO2: 100%   Weight: 71.9 kg (158 lb 8.2 oz)   Height: 5' 4" (1.626 m)   PainSc:   3   PainLoc: Hand       Physical Examination:   Physical Exam  Vitals and nursing note reviewed.   Constitutional:       General: She is not in acute distress.     Appearance: She is not diaphoretic.   HENT:      Head: Normocephalic.      Mouth/Throat:      Pharynx: No oropharyngeal exudate.   Eyes:      General: No scleral icterus.     Conjunctiva/sclera: Conjunctivae normal.   Neck:      Thyroid: No thyromegaly.   Cardiovascular:      Rate and Rhythm: Normal rate and regular rhythm.      Heart sounds: Normal heart sounds. No murmur heard.  Pulmonary:      Effort: Pulmonary effort is normal. No respiratory distress.      Breath sounds: No stridor. No wheezing or rales.   Chest:      Chest wall: No tenderness.   Abdominal:      General: Bowel sounds are normal. There is no distension.      Palpations: Abdomen is soft. There is no mass.      Tenderness: There is no abdominal tenderness. There is no rebound.   Musculoskeletal:         General: No tenderness or deformity. Normal range of motion.      Cervical back: Neck supple.   Lymphadenopathy:      Cervical: No cervical adenopathy.   Skin:     General: Skin is warm and dry.      Findings: No erythema or rash.      Comments: B/l mastectomy with reconstruction post op changes, healing well   Neurological:      Mental Status: She is alert and oriented to person, place, and time.      Cranial Nerves: No cranial nerve deficit.      Coordination: Coordination normal.      Gait: Gait is intact.   Psychiatric:         Mood and Affect: Affect normal.         Cognition and Memory: Memory normal.         Judgment: Judgment normal.      "     Diagnostic Tests:  Significant Imaging: I have reviewed and interpreted all pertinent imaging results/findings.      Laboratory Data:  All pertinent labs have been reviewed.    Labs:   Lab Results   Component Value Date    WBC 6.51 11/02/2023    HGB 14.5 11/02/2023    HCT 43.3 11/02/2023    MCV 92 11/02/2023     11/02/2023       Assessment/Plan:   Invasive ductal carcinoma of left breast in female   Stage I pT1c pN0 ER/[place] +, Her 2 equivocal by IHC, neg by FISH    She presented with early stage, node negative breast cancer, now status post bilateral mastectomy with reconstruction.    We discussed her Oncotype testing, the low 7 with 3% risk of recurrence at 9 years with endocrine therapy.    See prior notes for discussion, is on Tamoxifen and tolerating it well.     Continue active surveillance. Will see her back in 4  months or earlier in case of any tolerability issues.       Osteopenia  On calcium and vitamin D.        ECOG SCORE    0 - Fully active-able to carry on all pre-disease performance without restriction           Discussion:   No follow-ups on file.    Plan was discussed with the patient at length, and she verbalized understanding. Tegan was given an opportunity to ask questions that were answered to her satisfaction, and she was advised to call in the interval if any problems or questions arise.    Electronically signed by Yessy Stephens MD      Route Chart for Scheduling    Med Onc Chart Routing      Follow up with physician 4 months.   Follow up with JEFFY    Infusion scheduling note    Injection scheduling note    Labs    Imaging    Pharmacy appointment    Other referrals

## 2023-11-14 ENCOUNTER — TELEPHONE (OUTPATIENT)
Dept: FAMILY MEDICINE | Facility: CLINIC | Age: 68
End: 2023-11-14
Payer: MEDICARE

## 2023-11-14 ENCOUNTER — HOSPITAL ENCOUNTER (OUTPATIENT)
Dept: RADIOLOGY | Facility: HOSPITAL | Age: 68
Discharge: HOME | End: 2023-11-14
Attending: FAMILY MEDICINE
Payer: MEDICARE

## 2023-11-14 DIAGNOSIS — N30.00 ACUTE CYSTITIS WITHOUT HEMATURIA: Primary | ICD-10-CM

## 2023-11-14 DIAGNOSIS — Z78.0 POSTMENOPAUSAL STATUS: ICD-10-CM

## 2023-11-14 PROCEDURE — 77080 DXA BONE DENSITY AXIAL SKELETON 1 OR MORE SITES: ICD-10-PCS | Mod: 26,HCNC,, | Performed by: RADIOLOGY

## 2023-11-14 PROCEDURE — 77080 DXA BONE DENSITY AXIAL: CPT | Mod: TC,HCNC,PO

## 2023-11-14 PROCEDURE — 77080 DXA BONE DENSITY AXIAL: CPT | Mod: 26,HCNC,, | Performed by: RADIOLOGY

## 2023-11-14 RX ORDER — SULFAMETHOXAZOLE AND TRIMETHOPRIM 800; 160 MG/1; MG/1
1 TABLET ORAL 2 TIMES DAILY
Qty: 10 TABLET | Refills: 0 | Status: SHIPPED | OUTPATIENT
Start: 2023-11-14 | End: 2023-12-27

## 2023-11-15 ENCOUNTER — HOSPITAL ENCOUNTER (OUTPATIENT)
Dept: RADIOLOGY | Facility: HOSPITAL | Age: 68
Discharge: HOME | End: 2023-11-15
Attending: FAMILY MEDICINE
Payer: MEDICARE

## 2023-11-15 ENCOUNTER — CLINICAL SUPPORT (OUTPATIENT)
Dept: CARDIOLOGY | Facility: HOSPITAL | Age: 68
End: 2023-11-15
Attending: FAMILY MEDICINE
Payer: MEDICARE

## 2023-11-15 VITALS — BODY MASS INDEX: 26.98 KG/M2 | HEIGHT: 64 IN | HEART RATE: 70 BPM | WEIGHT: 158 LBS

## 2023-11-15 DIAGNOSIS — I10 PRIMARY HYPERTENSION: ICD-10-CM

## 2023-11-15 DIAGNOSIS — R91.1 SOLITARY PULMONARY NODULE: ICD-10-CM

## 2023-11-15 DIAGNOSIS — Z13.6 ENCOUNTER FOR SCREENING FOR CARDIOVASCULAR DISORDERS: ICD-10-CM

## 2023-11-15 DIAGNOSIS — Z79.899 HIGH RISK MEDICATIONS (NOT ANTICOAGULANTS) LONG-TERM USE: ICD-10-CM

## 2023-11-15 LAB
ASCENDING AORTA: 2.01 CM
AV INDEX (PROSTH): 0.76
AV MEAN GRADIENT: 5 MMHG
AV PEAK GRADIENT: 8 MMHG
AV REGURGITATION PRESSURE HALF TIME: 447.95 MS
AV VALVE AREA BY VELOCITY RATIO: 2.29 CM²
AV VALVE AREA: 2.26 CM²
AV VELOCITY RATIO: 0.77
BSA FOR ECHO PROCEDURE: 1.8 M2
CV ECHO LV RWT: 0.4 CM
DOP CALC AO PEAK VEL: 1.37 M/S
DOP CALC AO VTI: 34 CM
DOP CALC LVOT AREA: 3 CM2
DOP CALC LVOT DIAMETER: 1.95 CM
DOP CALC LVOT PEAK VEL: 1.05 M/S
DOP CALC LVOT STROKE VOLUME: 76.71 CM3
DOP CALCLVOT PEAK VEL VTI: 25.7 CM
E WAVE DECELERATION TIME: 198.21 MSEC
E/A RATIO: 1.16
E/E' RATIO: 8.19 M/S
ECHO LV POSTERIOR WALL: 0.79 CM (ref 0.6–1.1)
EJECTION FRACTION: 60 %
FRACTIONAL SHORTENING: 27 % (ref 28–44)
INTERVENTRICULAR SEPTUM: 0.55 CM (ref 0.6–1.1)
IVRT: 125.59 MSEC
LEFT ATRIUM SIZE: 2.8 CM
LEFT ATRIUM VOLUME INDEX MOD: 23 ML/M2
LEFT ATRIUM VOLUME MOD: 40.7 CM3
LEFT INTERNAL DIMENSION IN SYSTOLE: 2.85 CM (ref 2.1–4)
LEFT VENTRICLE DIASTOLIC VOLUME INDEX: 37.42 ML/M2
LEFT VENTRICLE DIASTOLIC VOLUME: 66.23 ML
LEFT VENTRICLE MASS INDEX: 40 G/M2
LEFT VENTRICLE SYSTOLIC VOLUME INDEX: 17.4 ML/M2
LEFT VENTRICLE SYSTOLIC VOLUME: 30.81 ML
LEFT VENTRICULAR INTERNAL DIMENSION IN DIASTOLE: 3.91 CM (ref 3.5–6)
LEFT VENTRICULAR MASS: 71.26 G
LV LATERAL E/E' RATIO: 8.6 M/S
LV SEPTAL E/E' RATIO: 7.82 M/S
LVOT MG: 2.69 MMHG
LVOT MV: 0.79 CM/S
MV PEAK A VEL: 0.74 M/S
MV PEAK E VEL: 0.86 M/S
MV STENOSIS PRESSURE HALF TIME: 57.48 MS
MV VALVE AREA P 1/2 METHOD: 3.83 CM2
PISA AR MAX VEL: 4.92 M/S
PISA MRMAX VEL: 5.83 M/S
PISA TR MAX VEL: 2.71 M/S
PULM VEIN S/D RATIO: 1.78
PV PEAK D VEL: 0.5 M/S
PV PEAK S VEL: 0.89 M/S
RA PRESSURE ESTIMATED: 3 MMHG
RA WIDTH: 3.4 CM
RIGHT VENTRICULAR END-DIASTOLIC DIMENSION: 3.4 CM
RIGHT VENTRICULAR LENGTH IN DIASTOLE (APICAL 4-CHAMBER VIEW): 4.13 CM
RV MID DIAMA: 2.16 CM
RV TB RVSP: 6 MMHG
RV TISSUE DOPPLER FREE WALL SYSTOLIC VELOCITY 1 (APICAL 4 CHAMBER VIEW): 17.87 CM/S
SINUS: 2.82 CM
STJ: 2.26 CM
TDI LATERAL: 0.1 M/S
TDI SEPTAL: 0.11 M/S
TDI: 0.11 M/S
TR MAX PG: 29 MMHG
TRICUSPID ANNULAR PLANE SYSTOLIC EXCURSION: 2.8 CM
TV REST PULMONARY ARTERY PRESSURE: 32 MMHG
Z-SCORE OF LEFT VENTRICULAR DIMENSION IN END DIASTOLE: -2.24
Z-SCORE OF LEFT VENTRICULAR DIMENSION IN END SYSTOLE: -0.47

## 2023-11-15 PROCEDURE — 71250 CT CHEST WITHOUT CONTRAST: ICD-10-PCS | Mod: 26,59,HCNC, | Performed by: RADIOLOGY

## 2023-11-15 PROCEDURE — 93306 TTE W/DOPPLER COMPLETE: CPT | Mod: HCNC,PO

## 2023-11-15 PROCEDURE — 75571 CT HRT W/O DYE W/CA TEST: CPT | Mod: 26,HCNC,, | Performed by: RADIOLOGY

## 2023-11-15 PROCEDURE — 75571 CT HRT W/O DYE W/CA TEST: CPT | Mod: 59,TC,HCNC,PO

## 2023-11-15 PROCEDURE — 93306 TTE W/DOPPLER COMPLETE: CPT | Mod: 26,HCNC,, | Performed by: INTERNAL MEDICINE

## 2023-11-15 PROCEDURE — 75571 CT CALCIUM SCORING CARDIAC: ICD-10-PCS | Mod: 26,HCNC,, | Performed by: RADIOLOGY

## 2023-11-15 PROCEDURE — 71250 CT THORAX DX C-: CPT | Mod: 26,59,HCNC, | Performed by: RADIOLOGY

## 2023-11-15 PROCEDURE — 71250 CT THORAX DX C-: CPT | Mod: TC,HCNC,PO

## 2023-11-15 PROCEDURE — 93306 ECHO (CUPID ONLY): ICD-10-PCS | Mod: 26,HCNC,, | Performed by: INTERNAL MEDICINE

## 2024-01-26 ENCOUNTER — OFFICE VISIT (OUTPATIENT)
Dept: HOME HEALTH SERVICES | Facility: CLINIC | Age: 69
End: 2024-01-26
Payer: MEDICARE

## 2024-01-26 VITALS
HEIGHT: 64 IN | DIASTOLIC BLOOD PRESSURE: 71 MMHG | OXYGEN SATURATION: 97 % | BODY MASS INDEX: 26.98 KG/M2 | WEIGHT: 158 LBS | SYSTOLIC BLOOD PRESSURE: 140 MMHG | HEART RATE: 85 BPM

## 2024-01-26 DIAGNOSIS — I10 PRIMARY HYPERTENSION: ICD-10-CM

## 2024-01-26 DIAGNOSIS — M85.80 OSTEOPENIA, UNSPECIFIED LOCATION: ICD-10-CM

## 2024-01-26 DIAGNOSIS — Z00.00 ENCOUNTER FOR PREVENTIVE HEALTH EXAMINATION: ICD-10-CM

## 2024-01-26 DIAGNOSIS — Z00.00 ENCOUNTER FOR MEDICARE ANNUAL WELLNESS EXAM: Primary | ICD-10-CM

## 2024-01-26 DIAGNOSIS — C50.912 INVASIVE DUCTAL CARCINOMA OF BREAST, FEMALE, LEFT: ICD-10-CM

## 2024-01-26 DIAGNOSIS — Z79.810 LONG-TERM CURRENT USE OF TAMOXIFEN: ICD-10-CM

## 2024-01-26 DIAGNOSIS — E78.5 HYPERLIPIDEMIA, UNSPECIFIED HYPERLIPIDEMIA TYPE: ICD-10-CM

## 2024-01-26 PROCEDURE — 1160F RVW MEDS BY RX/DR IN RCRD: CPT | Mod: CPTII,S$GLB,, | Performed by: NURSE PRACTITIONER

## 2024-01-26 PROCEDURE — G0439 PPPS, SUBSEQ VISIT: HCPCS | Mod: S$GLB,,, | Performed by: NURSE PRACTITIONER

## 2024-01-26 PROCEDURE — 3077F SYST BP >= 140 MM HG: CPT | Mod: CPTII,S$GLB,, | Performed by: NURSE PRACTITIONER

## 2024-01-26 PROCEDURE — 1159F MED LIST DOCD IN RCRD: CPT | Mod: CPTII,S$GLB,, | Performed by: NURSE PRACTITIONER

## 2024-01-26 PROCEDURE — 3078F DIAST BP <80 MM HG: CPT | Mod: CPTII,S$GLB,, | Performed by: NURSE PRACTITIONER

## 2024-01-26 PROCEDURE — 1101F PT FALLS ASSESS-DOCD LE1/YR: CPT | Mod: CPTII,S$GLB,, | Performed by: NURSE PRACTITIONER

## 2024-01-26 PROCEDURE — 3288F FALL RISK ASSESSMENT DOCD: CPT | Mod: CPTII,S$GLB,, | Performed by: NURSE PRACTITIONER

## 2024-01-29 NOTE — PROGRESS NOTES
"  Tegan Alarcon presented for an initial Medicare AWV today. The following components were reviewed and updated:    Medical history  Family History  Social history  Allergies and Current Medications  Health Risk Assessment  Health Maintenance  Care Team    **See Completed Assessments for Annual Wellness visit with in the encounter summary    The following assessments were completed:  Depression Screening  Cognitive function Screening  Timed Get Up Test  Whisper Test      Opioid documentation:      Patient does not have a current opioid prescription.          Vitals:    01/26/24 0822   BP: (!) 140/71   Pulse: 85   SpO2: 97%   Weight: 71.7 kg (158 lb)   Height: 5' 4" (1.626 m)     Body mass index is 27.12 kg/m².       Physical Exam  Constitutional:       Appearance: Normal appearance.   HENT:      Head: Normocephalic and atraumatic.      Nose: Nose normal.      Mouth/Throat:      Mouth: Mucous membranes are dry.   Eyes:      Extraocular Movements: Extraocular movements intact.      Pupils: Pupils are equal, round, and reactive to light.   Cardiovascular:      Rate and Rhythm: Normal rate and regular rhythm.   Pulmonary:      Effort: Pulmonary effort is normal.      Breath sounds: Normal breath sounds.   Abdominal:      General: Bowel sounds are normal.      Palpations: Abdomen is soft.   Musculoskeletal:         General: Normal range of motion.      Cervical back: Normal range of motion and neck supple.   Skin:     General: Skin is warm and dry.   Neurological:      General: No focal deficit present.      Mental Status: She is alert and oriented to person, place, and time.   Psychiatric:         Mood and Affect: Mood normal.         Behavior: Behavior normal.           Diagnoses and health risks identified today and associated recommendations/orders:  1. Encounter for Medicare annual wellness exam  - Ambulatory Referral/Consult to Enhanced Annual Wellness Visit (eAWV)    2. Encounter for preventive health " examination  Awv     3. Primary hypertension  - Ambulatory referral/consult to Optometry; Future  Chronic and stable. Continue current treatment. Follow with PCP.  On meds    4. Invasive ductal carcinoma of breast, female, left  Chronic and stable. Continue current treatment. Follow with PCP.  Following with oncology    5. Hyperlipidemia, unspecified hyperlipidemia type  Chronic and stable. Continue current treatment. Follow with PCP.  Monitor diet     6. Long-term current use of tamoxifen  Chronic and stable. Continue current treatment. Follow with PCP.      7. Osteopenia, unspecified location  Chronic and stable. Continue current treatment. Follow with PCP.  On calcium and vit D      Provided Tegan with a 5-10 year written screening schedule and personal prevention plan. Recommendations were developed using the USPSTF age appropriate recommendations. Education, counseling, and referrals were provided as needed.  After Visit Summary printed and given to patient which includes a list of additional screenings\tests needed.    Fu in 1 yr for munir Dimas NP

## 2024-01-29 NOTE — PATIENT INSTRUCTIONS
Counseling and Referral of Other Preventative  (Italic type indicates deductible and co-insurance are waived)    Patient Name: Tegan Alarcon  Today's Date: 1/28/2024    Health Maintenance       Date Due Completion Date    COVID-19 Vaccine (1) Never done ---    Shingles Vaccine (1 of 2) Never done ---    RSV Vaccine (Age 60+ and Pregnant patients) (1 - 1-dose 60+ series) Never done ---    Pneumococcal Vaccines (Age 65+) (2 of 2 - PPSV23 or PCV20) 11/07/2022 9/12/2022    Influenza Vaccine (1) 09/01/2023 12/17/2022    Lipid Panel 11/02/2024 11/2/2023    Colorectal Cancer Screening 10/09/2025 10/9/2020    Hemoglobin A1c (Diabetic Prevention Screening) 11/02/2026 11/2/2023    DEXA Scan 11/14/2026 11/14/2023    TETANUS VACCINE 02/03/2033 2/3/2023        Orders Placed This Encounter   Procedures    Ambulatory referral/consult to Optometry     The following information is provided to all patients.  This information is to help you find resources for any of the problems found today that may be affecting your health:                  Living healthy guide: www.[facility].[place].gov      Understanding Diabetes: www.diabetes.org      Eating healthy: www.cdc.gov/healthyweight      CDC home safety checklist: www.cdc.gov/steadi/patient.html      Agency on Aging: www.goea.[place].[facility]      Alcoholics anonymous (AA): www.aa.org      Physical Activity: www.ruthie.nih.gov/ms0eytp      Tobacco use: www.quitwithusla.org

## 2024-02-02 ENCOUNTER — PATIENT MESSAGE (OUTPATIENT)
Dept: FAMILY MEDICINE | Facility: CLINIC | Age: 69
End: 2024-02-02
Payer: MEDICARE

## 2024-03-14 ENCOUNTER — OFFICE VISIT (OUTPATIENT)
Dept: HEMATOLOGY/ONCOLOGY | Facility: CLINIC | Age: 69
End: 2024-03-14
Payer: MEDICARE

## 2024-03-14 VITALS
BODY MASS INDEX: 27.13 KG/M2 | HEIGHT: 64 IN | RESPIRATION RATE: 18 BRPM | OXYGEN SATURATION: 98 % | HEART RATE: 75 BPM | TEMPERATURE: 98 F | WEIGHT: 158.94 LBS

## 2024-03-14 DIAGNOSIS — C50.912 INVASIVE DUCTAL CARCINOMA OF LEFT BREAST IN FEMALE: Primary | ICD-10-CM

## 2024-03-14 DIAGNOSIS — Z79.810 LONG-TERM CURRENT USE OF TAMOXIFEN: ICD-10-CM

## 2024-03-14 PROCEDURE — 1160F RVW MEDS BY RX/DR IN RCRD: CPT | Mod: HCNC,CPTII,S$GLB, | Performed by: INTERNAL MEDICINE

## 2024-03-14 PROCEDURE — 3008F BODY MASS INDEX DOCD: CPT | Mod: HCNC,CPTII,S$GLB, | Performed by: INTERNAL MEDICINE

## 2024-03-14 PROCEDURE — 1101F PT FALLS ASSESS-DOCD LE1/YR: CPT | Mod: HCNC,CPTII,S$GLB, | Performed by: INTERNAL MEDICINE

## 2024-03-14 PROCEDURE — 99999 PR PBB SHADOW E&M-EST. PATIENT-LVL III: CPT | Mod: PBBFAC,HCNC,, | Performed by: INTERNAL MEDICINE

## 2024-03-14 PROCEDURE — 99215 OFFICE O/P EST HI 40 MIN: CPT | Mod: HCNC,S$GLB,, | Performed by: INTERNAL MEDICINE

## 2024-03-14 PROCEDURE — 1159F MED LIST DOCD IN RCRD: CPT | Mod: HCNC,CPTII,S$GLB, | Performed by: INTERNAL MEDICINE

## 2024-03-14 PROCEDURE — 3288F FALL RISK ASSESSMENT DOCD: CPT | Mod: HCNC,CPTII,S$GLB, | Performed by: INTERNAL MEDICINE

## 2024-03-14 PROCEDURE — 4010F ACE/ARB THERAPY RXD/TAKEN: CPT | Mod: HCNC,CPTII,S$GLB, | Performed by: INTERNAL MEDICINE

## 2024-03-14 NOTE — PROGRESS NOTES
PROGRESS NOTE    Subjective:       Patient ID: Tegan Alarcon is a 68 y.o. female.  MRN: [id number]  : 1955    Chief Complaint: IDC left breast    Stage I (T1b, N0, M0, Grade 1, ER+/CA+/HER2 pend)     History of Present Illness:   Tegan Alarcon is a 68 y.o. female who is referred for IDC of the left breast.       Has been undergoing screening mammograms. Has had benign cysts previously , drained with benign findings several years ago, more like 15 years ago. During that time, she was on Tamoxifen for prevention of recurrent cysts and took it for 5 years.     Did not notice any lump or breast mass. Abnormality was noted on screening mammogram this year.     Menarche at age 15 yrs old. .   Age at first live birth 32yrs old.   Did not breast feed.   LMP in . OCP-for about a year in her 20's.   Menopause at 44yrs old, had CINTHIA for prevention.  HRT-None    Family history cancer: Mother at 41yrs old, Maternal Aunt at 80yrs old, and Maternal Cousin at 40yrs old, Maternal cousin at 41yrs old had breast cancer. Genetics negative.     Other medical conditions include well controlled HTN and osteopenia.     Interim history:   S/p b/l mastectomy and implant based reconstruction.   Oncotype low, no adjuvant chemotherapy was recommended.     She started Tamoxifen 23, tolerating well. Denies significant hot flashes.   Had a fall recently and was referred to [facility] for balance.   Feels her balance is improving.     Has seen her ophthalmologist who has stared monitoring her every 6 months while she in on Tamoxifen.       Oncology History:  24  Impression:  Left  Asymmetry: Left breast 10 mm asymmetry at the posterior 12 o'clock position. Assessment: 0 - Incomplete. Diagnostic Mammogram and/or Ultrasound is recommended.      Right  There is no mammographic evidence of malignancy in the right breast.     BI-RADS Category:   Overall: 0 - Incomplete: Needs  Additional Imaging Evaluation    11/7/22  Diagnostic mammogram  Impression:  Left  Mass: Left breast 10 mm x 7 mm x 7 mm mass at the posterior 11 o'clock position. Assessment: 4 - Suspicious finding. Biopsy is recommended.      BI-RADS Category:   Overall: 4 - Suspicious    12/7/22  Breast, left, mass at 11:00 5N, biopsy:   - Invasive ductal carcinoma   - Betty ndgndrndanddndend:nd nd2nd of 3        - Tubules: 2        - Nuclei: 2        - Mitosis: 1     - Tumor involves multiple core fragments and measures 7 mm in greatest linear   dimension   - Immunostain for p63 support the above interpretation   - Please see RECEPTOR STUDIES below   Dr. ROSSY Mosley has reviewed this case and agrees with the above   interpretation.     RECEPTOR STUDIES   Estrogen receptor:  Positive; strong nuclear staining in 95% of tumor cells   Progesterone receptor:  Positive; strong nuclear staining in 80% of tumor   cells   Her2:  Equivocal  (Stain score = 2+; additional testing for HER2 by FISH has   been requested and will be reported separately upon completion)   Ki-67:  15%     3/8/23  1.  BREAST, LEFT, MASTECTOMY:   --INVASIVE CARCINOMA OF NO SPECIAL TYPE (DUCTAL); BETTY HISTOLOGIC    GRADE 2 OUT OF 3, WITH FOCAL           MICROPAPILLARY FEATURES (APPROXIMATELY 5%).   --INVASIVE CARCINOMA MEASURES 11 MILLIMETERS IN MAXIMUM DIMENSION AND    IS PRESENT IN 11:00 REGION.        --BIOPSY SITE CHANGES.   --RECEPTOR STUDIES REPORTED PREVIOUSLY AT OUTSIDE FACILITY ON BIOPSY    MATERIAL (SEE COMMENT).        --NO LYMPHATIC AND/OR VASCULAR INVASION IDENTIFIED.   --PART 1 SPECIMEN POSTERIOR MARGIN IS POSITIVE FOR INVASIVE CARCINOMA;    ALL REMAINING PART 1   SPECIMEN MARGINS ARE NEGATIVE FOR INVASIVE CARCINOMA; ADDITIONAL    MARGINS SENT SEPARATELY           (SEE BELOW PARTS 2 AND 3).        --ATYPICAL LOBULAR HYPERPLASIA.        --MICROSCOPIC FIBROADENOMATOID NODULE.   --MICROCALCIFICATIONS PRESENT (PREDOMINANTLY IN NON-NEOPLASTIC TISSUE).         "--pT1c/pN0.     2.  BREAST, FURTHER DESIGNATED "DEEP MARGIN," EXCISION:        --NO MORPHOLOGIC EVIDENCE OF MALIGNANCY.        --HISTOMORPHOLOGICALLY UNREMARKABLE SKELETAL MUSCLE.     3. BREAST, LEFT, FURTHER DESIGNATED "ANTERIOR SUPERIOR MEDIAL MARGIN,"    EXCISION:        --NO MORPHOLOGIC EVIDENCE OF MALIGNANCY.     4.  LYMPH NODE, LEFT SENTINEL, EXCISIONAL BIOPSY:        --ONE LYMPH NODE WITH NO MORPHOLOGIC EVIDENCE OF MALIGNANCY (0/1).     5.  BREAST, RIGHT, MASTECTOMY:        --NO MORPHOLOGIC EVIDENCE OF MALIGNANCY.        --ATYPICAL LOBULAR HYPERPLASIA, MULTIFOCAL.   --FIBROCYSTIC CHANGES INCLUDING STROMAL FIBROSIS, CYSTIC DILATION OF    DUCTS, APOCRINE METAPLASIA,           AND FOCAL SCLEROSING ADENOSIS.        --FOCAL MICROCALCIFICATIONS ASSOCIATED WITH NON-NEOPLASTIC TISSUE.   History:  Past Medical History:   Diagnosis Date    Bone disorder     pre osteoporosis    Cancer 2022    breast    Hypertension     diagnosed 6/2014    PONV (postoperative nausea and vomiting)       Past Surgical History:   Procedure Laterality Date    breast biopsies      BREAST BIOPSY      BREAST RECONSTRUCTION Bilateral 3/8/2023    Procedure: RECONSTRUCTION, BREAST - Immediate to Implant;  Surgeon: Guillermo Hemphill MD;  Location: [facility] OR;  Service: Plastics;  Laterality: Bilateral;    BREAST SURGERY  2000    COLONOSCOPY N/A 10/09/2020    Procedure: COLONOSCOPY;  Surgeon: Surinder Conley MD;  Location: [facility] ENDO;  Service: Endoscopy;  Laterality: N/A;    EYE SURGERY Left 2018    lesion on eyelid    INSERTION OF BREAST IMPLANT Bilateral 3/8/2023    Procedure: INSERTION, BREAST IMPLANT;  Surgeon: Guillermo Hemphill MD;  Location: [facility] OR;  Service: Plastics;  Laterality: Bilateral;    PLACEMENT OF ACELLULAR HUMAN DERMAL ALLOGRAFT Bilateral 3/8/2023    Procedure: APPLICATION, ACELLULAR HUMAN DERMAL ALLOGRAFT;  Surgeon: Guillermo Hemphill MD;  Location: [facility] OR;  Service: Plastics;  Laterality: Bilateral;    SENTINEL LYMPH NODE BIOPSY Left 3/8/2023 "    Procedure: BIOPSY, LYMPH NODE, SENTINEL;  Surgeon: Rosa Odonnell MD;  Location: [facility] OR;  Service: General;  Laterality: Left;    SIMPLE MASTECTOMY Bilateral 3/8/2023    Procedure: MASTECTOMY, SIMPLE;  Surgeon: Rosa Odonnell MD;  Location: [facility] OR;  Service: General;  Laterality: Bilateral;    TONSILLECTOMY      TOTAL ABDOMINAL HYSTERECTOMY W/ BILATERAL SALPINGOOPHORECTOMY       Family History   Problem Relation Age of Onset    Breast cancer Mother 45         at 88    Heart disease Father     Hypertension Father     Breast cancer Maternal Aunt     Breast cancer Maternal Aunt     Breast cancer Maternal Aunt     Hypertension Paternal Grandmother     Stroke Paternal Grandmother     Breast cancer Cousin     Breast cancer Cousin     Breast cancer Other         cousins/several aunts      Social History     Tobacco Use    Smoking status: Never    Smokeless tobacco: Never   Substance and Sexual Activity    Alcohol use: No     Comment: SOCIAL    Drug use: Never    Sexual activity: Yes     Partners: Male     Birth control/protection: None        ROS:   Review of Systems   Constitutional:  Negative for fever, malaise/fatigue and weight loss.   HENT:  Negative for congestion, hearing loss, nosebleeds and sore throat.    Eyes:  Negative for double vision and photophobia.   Respiratory:  Negative for cough, hemoptysis, sputum production, shortness of breath and wheezing.    Cardiovascular:  Negative for chest pain, palpitations, orthopnea and leg swelling.   Gastrointestinal:  Negative for abdominal pain, blood in stool, constipation, diarrhea, heartburn, nausea and vomiting.   Genitourinary:  Negative for dysuria, hematuria and urgency.   Musculoskeletal:  Negative for back pain, joint pain and myalgias.   Skin:  Negative for itching and rash.   Neurological:  Negative for dizziness, tingling, seizures, weakness and headaches.   Endo/Heme/Allergies:  Negative for polydipsia. Does not bruise/bleed  "easily.   Psychiatric/Behavioral:  Negative for depression and memory loss. The patient is not nervous/anxious and does not have insomnia.         Objective:     Vitals:    03/14/24 1426   Pulse: 75   Resp: 18   Temp: 97.5 °F (36.4 °C)   TempSrc: Temporal   SpO2: 98%   Weight: 72.1 kg (158 lb 15.2 oz)   Height: 5' 4" (1.626 m)         Physical Examination:   Physical Exam  Vitals and nursing note reviewed.   Constitutional:       General: She is not in acute distress.     Appearance: She is not diaphoretic.   HENT:      Head: Normocephalic.      Mouth/Throat:      Pharynx: No oropharyngeal exudate.   Eyes:      General: No scleral icterus.     Conjunctiva/sclera: Conjunctivae normal.   Neck:      Thyroid: No thyromegaly.   Cardiovascular:      Rate and Rhythm: Normal rate and regular rhythm.      Heart sounds: Normal heart sounds. No murmur heard.  Pulmonary:      Effort: Pulmonary effort is normal. No respiratory distress.      Breath sounds: No stridor. No wheezing or rales.   Chest:      Chest wall: No tenderness.   Abdominal:      General: Bowel sounds are normal. There is no distension.      Palpations: Abdomen is soft. There is no mass.      Tenderness: There is no abdominal tenderness. There is no rebound.   Musculoskeletal:         General: No tenderness or deformity. Normal range of motion.      Cervical back: Neck supple.   Lymphadenopathy:      Cervical: No cervical adenopathy.   Skin:     General: Skin is warm and dry.      Findings: No erythema or rash.      Comments: B/l mastectomy with reconstruction post op changes, healing well   Neurological:      Mental Status: She is alert and oriented to person, place, and time.      Cranial Nerves: No cranial nerve deficit.      Coordination: Coordination normal.      Gait: Gait is intact.   Psychiatric:         Mood and Affect: Affect normal.         Cognition and Memory: Memory normal.         Judgment: Judgment normal.        Diagnostic Tests:  Significant " Imaging: I have reviewed and interpreted all pertinent imaging results/findings.      Laboratory Data:  All pertinent labs have been reviewed.    Labs:   Lab Results   Component Value Date    WBC 6.51 11/02/2023    HGB 14.5 11/02/2023    HCT 43.3 11/02/2023    MCV 92 11/02/2023     11/02/2023       Assessment/Plan:   Invasive ductal carcinoma of left breast in female   Stage I pT1c pN0 ER/WA +, Her 2 equivocal by IHC, neg by FISH    She presented with early stage, node negative breast cancer, now status post bilateral mastectomy with reconstruction.    We discussed her Oncotype testing, the low 7 with 3% risk of recurrence at 9 years with endocrine therapy.    See prior notes for discussion, is on Tamoxifen and tolerating it well. Has a Gyn appointment in May.     Continue active surveillance. Will see her back in 4  months or earlier in case of any tolerability issues.     Osteopenia  On calcium and vitamin D.        ECOG SCORE               Discussion:   No follow-ups on file.    Plan was discussed with the patient at length, and she verbalized understanding. Tegan was given an opportunity to ask questions that were answered to her satisfaction, and she was advised to call in the interval if any problems or questions arise.    Electronically signed by Yessy Stephens MD      Route Chart for Scheduling    Med Onc Chart Routing      Follow up with physician 3 months. breast med onc   Follow up with JEFFY    Infusion scheduling note    Injection scheduling note    Labs    Imaging    Pharmacy appointment    Other referrals

## 2024-04-23 DIAGNOSIS — C50.912 INVASIVE DUCTAL CARCINOMA OF LEFT BREAST IN FEMALE: ICD-10-CM

## 2024-04-23 RX ORDER — TAMOXIFEN CITRATE 20 MG/1
20 TABLET ORAL DAILY
Qty: 90 TABLET | Refills: 6 | Status: SHIPPED | OUTPATIENT
Start: 2024-04-23 | End: 2025-04-23

## 2024-04-23 NOTE — TELEPHONE ENCOUNTER
----- Message from Bakari Centeno sent at 4/23/2024 10:01 AM CDT -----  Regarding: refill  Contact: patient  Type:  RX Refill Request    Who Called:  Patient   Refill or New Rx:  refill  RX Name and Strength:  tamoxifen (NOLVADEX) 20 MG Tab 90 tablet 6 4/3/2023 4/2/2024   Sig - Route: Take 1 tablet (20 mg total) by mouth once daily. - Oral   Sent to pharmacy as: tamoxifen (NOLVADEX) 20 MG Tab   E-Prescribing Status: Receipt confirmed by pharmacy (4/3/2023  2:22 PM CDT)       How is the patient currently taking it? (ex. 1XDay):  see above  Is this a 30 day or 90 day RX:  see above   Preferred Pharmacy with phone number:    [id number] [address] [facility]  [address] [place] [address]  Phone: [phone] Fax: [phone]    Local or Mail Order:  local   Ordering Provider:  Yessy Stephens  Best Call Back Number:  [phone]  Additional Information:  She stated that she only have 5 left.

## 2024-06-18 ENCOUNTER — OFFICE VISIT (OUTPATIENT)
Dept: HEMATOLOGY/ONCOLOGY | Facility: CLINIC | Age: 69
End: 2024-06-18
Payer: MEDICARE

## 2024-06-18 VITALS
DIASTOLIC BLOOD PRESSURE: 82 MMHG | HEART RATE: 83 BPM | OXYGEN SATURATION: 99 % | RESPIRATION RATE: 18 BRPM | TEMPERATURE: 98 F | WEIGHT: 161.38 LBS | HEIGHT: 64 IN | SYSTOLIC BLOOD PRESSURE: 139 MMHG | BODY MASS INDEX: 27.55 KG/M2

## 2024-06-18 DIAGNOSIS — Z79.810 LONG-TERM CURRENT USE OF TAMOXIFEN: ICD-10-CM

## 2024-06-18 DIAGNOSIS — C50.912 INVASIVE DUCTAL CARCINOMA OF LEFT BREAST IN FEMALE: Primary | ICD-10-CM

## 2024-06-18 DIAGNOSIS — M85.80 OSTEOPENIA, UNSPECIFIED LOCATION: ICD-10-CM

## 2024-06-18 PROCEDURE — 99999 PR PBB SHADOW E&M-EST. PATIENT-LVL III: CPT | Mod: PBBFAC,HCNC,, | Performed by: INTERNAL MEDICINE

## 2024-07-22 NOTE — PROGRESS NOTES
[facility]  [address] [address]  Phone: [phone]  [employer]: [phone]    Pt Name: Clyde Arellano  Injury Date: 07/22/2024   Employee ID:  Date of First Treatment: 07/22/2024   Company: [employer]      Appointment Time: 12:45 PM Arrived: 1304   Provider: Josette Cedillo NP Time Out:1429     Office Treatment:   1. Encounter related to worker's compensation claim    2. Fall, initial encounter    3. Wrist sprain, right, initial encounter    4. Right wrist pain                     Return Appointment: 07/30/2024             Name: Tegan Alarcon  MRN:  [id number]  :  1955 Age 68 y.o.  Date of Service: 2024    Reason for visit:  Tegan Alarcon is a 68 y.o. female here regarding...    #Invasive Ductal Carcinoma of the Left Breast   Date of Original Diagnosis: 22  Original Stage: Stage 1A pT1c pN0(sn) ER 95% IA 80% HER2 2+/FISH negative; AZ3410%  Current Sites of Disease: none  Current Goals of Therapy: curative   Current Therapy: Tamoxifen (start 23)    Oncologic History/History of Present Illness:     Has been undergoing screening mammograms. Has had benign cysts previously , drained with benign findings several years ago, more like 15 years ago. During that time, she was on Tamoxifen for prevention of recurrent cysts and took it for 5 years.     Did not notice any lump or breast mass. Abnormality was noted on screening mammogram   22 Impression:Left  Asymmetry: Left breast 10 mm asymmetry at the posterior 12 o'clock position. Assessment: 0 - Incomplete. Diagnostic Mammogram and/or Ultrasound is recommended.     Menarche at age 15 yrs old. .  Age at first live birth 32yrs old.   Did not breast feed.   LMP in . OCP-for about a year in her 20's.   Menopause at 44yrs old, had CINTHIA for prevention.  HRT-None    Family history cancer: Mother at 41yrs old, Maternal Aunt at 80yrs old, and Maternal Cousin at 40yrs old, Maternal cousin at 41yrs old had breast cancer. Genetics negative.     Other medical conditions include well controlled HTN and osteopenia.     3/8/23 b/l mastectomy and implant based reconstruction.   Oncotype 7, no adjuvant chemotherapy was recommended.     She started Tamoxifen 23, tolerating well. Denies significant hot flashes.   Had a fall recently and was referred to [facility] for balance.  Feels her balance is improving.     Has seen her ophthalmologist who has stared monitoring her every 6 months while she in on Tamoxifen.     Interval hx:   No changes reported. Doing well.     PHYSICAL  EXAMINATION:  ECOG PERFORMANCE STATUS: 0  Physical Exam   General:  Well-appearing, nontoxic  Eyes:  Equal and round pupils, EOMI, no scleral icterus  Mouth:  No lesions, moist  Cardiovascular:  Warm, well-perfused, no peripheral edema  Lungs:  Unlabored on room air, no wheezing  Neurologic:  Awake, alert and oriented, participating in the exam  Psych:  Appropriate mood and affect  Skin:  Normal pallor, No rashes  Heme:  No petechiae, no purpura  Breasts: bilateral implants, no palpable abnormalities otherwise, post-mastectomy site well healed and free of suspicious changes.      LABORATORY:  CBC  Lab Results   Component Value Date    WBC 6.51 11/02/2023    HGB 14.5 11/02/2023    HCT 43.3 11/02/2023    MCV 92 11/02/2023     11/02/2023         BMP  Lab Results   Component Value Date     11/02/2023    K 4.3 11/02/2023     11/02/2023    CO2 22 (L) 11/02/2023    BUN 23 11/02/2023    CREATININE 1.0 11/02/2023    CALCIUM 9.4 11/02/2023    ANIONGAP 13 11/02/2023    ESTGFRAFRICA >60.0 07/13/2021    EGFRNONAA >60.0 07/13/2021     PATHOLOGY:  RECEPTOR STUDIES   Estrogen receptor:  Positive; strong nuclear staining in 95% of tumor cells   Progesterone receptor:  Positive; strong nuclear staining in 80% of tumor   cells   Her2:  Equivocal  (Stain score = 2+; additional testing for HER2 by FISH has   been requested and will be reported separately upon completion)   Ki-67:  15%   3/8/23  1.  BREAST, LEFT, MASTECTOMY:   --INVASIVE CARCINOMA OF NO SPECIAL TYPE (DUCTAL); IRENE HISTOLOGIC    GRADE 2 OUT OF 3, WITH FOCAL           MICROPAPILLARY FEATURES (APPROXIMATELY 5%).   --INVASIVE CARCINOMA MEASURES 11 MILLIMETERS IN MAXIMUM DIMENSION AND    IS PRESENT IN 11:00 REGION.        --BIOPSY SITE CHANGES.   --RECEPTOR STUDIES REPORTED PREVIOUSLY AT OUTSIDE FACILITY ON BIOPSY    MATERIAL (SEE COMMENT).        --NO LYMPHATIC AND/OR VASCULAR INVASION IDENTIFIED.   --PART 1 SPECIMEN POSTERIOR MARGIN IS POSITIVE FOR  INVASIVE CARCINOMA;    ALL REMAINING PART 1   SPECIMEN MARGINS ARE NEGATIVE FOR INVASIVE CARCINOMA; ADDITIONAL    MARGINS SENT SEPARATELY           (SEE BELOW PARTS 2 AND 3).        --ATYPICAL LOBULAR HYPERPLASIA.        --MICROSCOPIC FIBROADENOMATOID NODULE.   --MICROCALCIFICATIONS PRESENT (PREDOMINANTLY IN NON-NEOPLASTIC TISSUE).        --pT1c/pN0.      4.  LYMPH NODE, LEFT SENTINEL, EXCISIONAL BIOPSY:        --ONE LYMPH NODE WITH NO MORPHOLOGIC EVIDENCE OF MALIGNANCY (0/1).       RADIOLOGY:        ASSESSMENT AND PLAN:  Tegan Alarcon is a 68 y.o. female with...  #Invasive Ductal Carcinoma of the Left Breast   Date of Original Diagnosis: 11/30/22  Original Stage: Stage 1A pT1c pN0(sn) ER 95% AZ 80% HER2 2+/FISH negative; [id number]%  Current Sites of Disease: none  Current Goals of Therapy: curative   Current Therapy: Tamoxifen (start 5/1/23)    She presented with early stage, node negative breast cancer, now status post bilateral mastectomy with reconstruction.    Oncotype-7 with 3% risk of recurrence at 9 years with endocrine therapy.    -on Tamoxifen and tolerating it well.     Continue active surveillance.      Osteopenia  On calcium and vitamin D.     Yadira Stephens M.D.  Hematology/Oncology       Med Onc Chart Routing      Follow up with physician 1 year. in a gown   Follow up with JEFFY 6 months.   Infusion scheduling note    Injection scheduling note    Labs    Imaging    Pharmacy appointment    Other referrals

## 2024-11-06 DIAGNOSIS — I10 HYPERTENSION: ICD-10-CM

## 2024-11-15 DIAGNOSIS — I10 PRIMARY HYPERTENSION: ICD-10-CM

## 2024-11-15 RX ORDER — LISINOPRIL 10 MG/1
10 TABLET ORAL DAILY
Qty: 90 TABLET | Refills: 0 | Status: SHIPPED | OUTPATIENT
Start: 2024-11-15

## 2024-11-15 NOTE — TELEPHONE ENCOUNTER
Care Due:                  Date            Visit Type   Department     Provider  --------------------------------------------------------------------------------                                MYCHART                              FOLLOWUP/OF  [facility] FAMILY  Last Visit: 10-      FICE VISIT   MEDICINE       Kari Miguel  Next Visit: None Scheduled  None         None Found                                                            Last  Test          Frequency    Reason                     Performed    Due Date  --------------------------------------------------------------------------------    Office Visit  15 months..  lisinopriL...............  10-   01-    CMP.........  12 months..  lisinopriL...............  11-   10-    [employer] Embedded Care Due Messages. Reference number: [id number].   11/15/2024 3:24:23 AM CST

## 2024-11-15 NOTE — TELEPHONE ENCOUNTER
Refill Routing Note   Medication(s) are not appropriate for processing by [facility] for the following reason(s):        Required labs outdated    [facility] action(s):  Defer   Requires appointment : Yes     Requires labs : Yes             Appointments  past 12m or future 3m with PCP    Date Provider   Last Visit   10/24/2023 Kari Miguel MD   Next Visit   12/2/2024 Kari Miguel MD   ED visits in past 90 days: 0        Note composed:11:22 AM 11/15/2024

## 2024-12-02 ENCOUNTER — OFFICE VISIT (OUTPATIENT)
Dept: FAMILY MEDICINE | Facility: CLINIC | Age: 69
End: 2024-12-02
Payer: MEDICARE

## 2024-12-02 VITALS
HEIGHT: 64 IN | SYSTOLIC BLOOD PRESSURE: 128 MMHG | BODY MASS INDEX: 27.38 KG/M2 | DIASTOLIC BLOOD PRESSURE: 80 MMHG | OXYGEN SATURATION: 97 % | HEART RATE: 86 BPM | WEIGHT: 160.38 LBS

## 2024-12-02 DIAGNOSIS — Z00.00 ROUTINE GENERAL MEDICAL EXAMINATION AT A HEALTH CARE FACILITY: Primary | ICD-10-CM

## 2024-12-02 DIAGNOSIS — Z79.899 HIGH RISK MEDICATIONS (NOT ANTICOAGULANTS) LONG-TERM USE: ICD-10-CM

## 2024-12-02 DIAGNOSIS — E78.5 HYPERLIPIDEMIA, UNSPECIFIED HYPERLIPIDEMIA TYPE: ICD-10-CM

## 2024-12-02 DIAGNOSIS — R22.31 LOCALIZED SWELLING, MASS, OR LUMP OF RIGHT UPPER EXTREMITY: ICD-10-CM

## 2024-12-02 PROCEDURE — 4010F ACE/ARB THERAPY RXD/TAKEN: CPT | Mod: HCNC,CPTII,S$GLB, | Performed by: FAMILY MEDICINE

## 2024-12-02 PROCEDURE — 99397 PER PM REEVAL EST PAT 65+ YR: CPT | Mod: 25,HCNC,S$GLB, | Performed by: FAMILY MEDICINE

## 2024-12-02 PROCEDURE — 3008F BODY MASS INDEX DOCD: CPT | Mod: HCNC,CPTII,S$GLB, | Performed by: FAMILY MEDICINE

## 2024-12-02 PROCEDURE — 1159F MED LIST DOCD IN RCRD: CPT | Mod: HCNC,CPTII,S$GLB, | Performed by: FAMILY MEDICINE

## 2024-12-02 PROCEDURE — 3074F SYST BP LT 130 MM HG: CPT | Mod: HCNC,CPTII,S$GLB, | Performed by: FAMILY MEDICINE

## 2024-12-02 PROCEDURE — 99999 PR PBB SHADOW E&M-EST. PATIENT-LVL V: CPT | Mod: PBBFAC,HCNC,, | Performed by: FAMILY MEDICINE

## 2024-12-02 PROCEDURE — 1160F RVW MEDS BY RX/DR IN RCRD: CPT | Mod: HCNC,CPTII,S$GLB, | Performed by: FAMILY MEDICINE

## 2024-12-02 PROCEDURE — 3288F FALL RISK ASSESSMENT DOCD: CPT | Mod: HCNC,CPTII,S$GLB, | Performed by: FAMILY MEDICINE

## 2024-12-02 PROCEDURE — 1126F AMNT PAIN NOTED NONE PRSNT: CPT | Mod: HCNC,CPTII,S$GLB, | Performed by: FAMILY MEDICINE

## 2024-12-02 PROCEDURE — 3079F DIAST BP 80-89 MM HG: CPT | Mod: HCNC,CPTII,S$GLB, | Performed by: FAMILY MEDICINE

## 2024-12-02 PROCEDURE — 1101F PT FALLS ASSESS-DOCD LE1/YR: CPT | Mod: HCNC,CPTII,S$GLB, | Performed by: FAMILY MEDICINE

## 2024-12-02 NOTE — PROGRESS NOTES
Patient ID:   Tegan Alarcon is a 69 y.o. female.    Chief Complaint: Annual Exam      History of Present Illness    CHIEF COMPLAINT:  Patient presents today for follow-up.    MEDICATIONS:  She continues Lisinopril for blood pressure management and Tamoxifen as prescribed. She also takes various vitamins.    DENTAL HEALTH:  She reports a recent dental infection requiring removal of a root canal, currently preparing for reinsertion. She was prescribed antibiotics for the infection and denies any GI issues related to the treatment.    LIPOMA:  She is self-conscious about a lipoma on her right arm, wearing longer sleeves to cover it. She expresses concern about limited access to her right arm due to the lipoma.    LABORATORY RESULTS:  She reports normal iron counts, B12 levels, thyroid function, and cholesterol levels, except for elevated triglycerides. A1C level, chemistry panels for liver, kidney, sugar, and CBC were all within normal limits.    MUSCULOSKELETAL:  She experiences right hip pain when getting up, which improves with movement.    URINARY HABITS:  She reports nocturia once nightly, sometimes not at all. 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 She avoids caffeine and chocolate due to bladder irritation.    EXERCISE:  She attends [employer] classes and has started incorporating walking into her routine, during which she states her rosary.      ROS:  General: -fever, -chills, -fatigue, -weight gain, -weight loss  Eyes: -vision changes, -redness, -discharge  ENT: -ear pain, -nasal congestion, -sore throat  Cardiovascular: -chest pain, -palpitations, -lower extremity edema  Respiratory: -cough, -shortness of breath  Gastrointestinal: -abdominal pain, -nausea, -vomiting, -diarrhea, -constipation, -blood in stool, -change in bowel habits  Genitourinary: -dysuria, -hematuria, -frequency, +nocturia  Musculoskeletal: +joint pain, -muscle pain  Skin: -rash, -lesion  Neurological: -headache,  -dizziness, -numbness, -tingling  Psychiatric: -anxiety, -depression, -sleep difficulty       Answers submitted by the patient for this visit:  Review of Systems Questionnaire (Submitted on 11/30/2024)  activity change: No  unexpected weight change: No  neck pain: No  hearing loss: No  rhinorrhea: No  trouble swallowing: No  eye discharge: No  visual disturbance: No  chest tightness: No  wheezing: No  chest pain: No  palpitations: No  blood in stool: No  constipation: No  vomiting: No  diarrhea: No  polydipsia: No  polyuria: No  difficulty urinating: No  hematuria: No  menstrual problem: No  dysuria: No  joint swelling: No  arthralgias: No  headaches: No  weakness: No  confusion: No  dysphoric mood: No      The 10-year ASCVD risk score (Nba DOWNING, et al., 2019) is: 8.7%    Values used to calculate the score:      Age: 69 years      Sex: Female      Is Non- : No      Diabetic: No      Tobacco smoker: No      Systolic Blood Pressure: 128 mmHg      Is BP treated: Yes      HDL Cholesterol: 50 mg/dL      Total Cholesterol: 164 mg/dL    Exam:  Physical Exam  Vitals and nursing note reviewed.   Constitutional:       General: She is not in acute distress.     Appearance: She is well-developed.   HENT:      Head: Normocephalic and atraumatic.      Right Ear: Tympanic membrane and external ear normal.      Left Ear: Tympanic membrane and external ear normal.      Nose: Nose normal.      Mouth/Throat:      Pharynx: No oropharyngeal exudate.   Eyes:      Conjunctiva/sclera: Conjunctivae normal.   Neck:      Thyroid: No thyromegaly.   Cardiovascular:      Rate and Rhythm: Normal rate and regular rhythm.   Pulmonary:      Effort: Pulmonary effort is normal. No respiratory distress.      Breath sounds: Normal breath sounds. No wheezing.   Abdominal:      General: Bowel sounds are normal. There is no distension.      Palpations: Abdomen is soft. There is no mass.      Tenderness: There is no abdominal  tenderness. There is no guarding or rebound.   Musculoskeletal:         General: Swelling (soft tissue swelling c/w lipoma R medial elbow) present.      Cervical back: Neck supple.      Right lower leg: No edema.      Left lower leg: No edema.   Lymphadenopathy:      Cervical: No cervical adenopathy.   Skin:     General: Skin is warm and dry.   Neurological:      General: No focal deficit present.      Mental Status: She is alert and oriented to person, place, and time.      Cranial Nerves: No cranial nerve deficit.   Psychiatric:         Mood and Affect: Mood normal.         Behavior: Behavior normal.         Assessment/Plan:  Routine general medical examination at a health care facility  Comments:  anticipatory guidance reviewed  Orders:  -     CBC Without Differential; Future; Expected date: 12/02/2024  -     Comprehensive Metabolic Panel; Future; Expected date: 12/02/2024  -     Hemoglobin A1C; Future; Expected date: 12/02/2024  -     Lipid Panel; Future; Expected date: 12/02/2024  -     TSH; Future; Expected date: 12/02/2024  -     Vitamin B12; Future; Expected date: 12/02/2024  -     Urinalysis, Reflex to Urine Culture Urine, Clean Catch; Future    Localized swelling, mass, or lump of right upper extremity  Comments:  update imaging and schedule review with general surgery  L arm not accessible following breast ca tx, R arm with limited access due to lipoma  Orders:  -     Cancel: Ambulatory referral/consult to General Surgery; Future; Expected date: 12/09/2024  -     CT Arm Elbow W W/O Contrast Right; Future; Expected date: 12/02/2024  -     Ambulatory referral/consult to General Surgery; Future; Expected date: 12/09/2024    Hyperlipidemia, unspecified hyperlipidemia type  -     CBC Without Differential; Future; Expected date: 12/02/2024  -     Comprehensive Metabolic Panel; Future; Expected date: 12/02/2024  -     Hemoglobin A1C; Future; Expected date: 12/02/2024  -     Lipid Panel; Future; Expected date:  12/02/2024  -     TSH; Future; Expected date: 12/02/2024  -     Vitamin B12; Future; Expected date: 12/02/2024  -     Urinalysis, Reflex to Urine Culture Urine, Clean Catch; Future    High risk medications (not anticoagulants) long-term use  -     CBC Without Differential; Future; Expected date: 12/02/2024  -     Comprehensive Metabolic Panel; Future; Expected date: 12/02/2024  -     Hemoglobin A1C; Future; Expected date: 12/02/2024  -     Lipid Panel; Future; Expected date: 12/02/2024  -     TSH; Future; Expected date: 12/02/2024  -     Vitamin B12; Future; Expected date: 12/02/2024  -     Urinalysis, Reflex to Urine Culture Urine, Clean Catch; Future         Assessment & Plan    Evaluated patient's overall health status; no major changes reported  Considered removal of lipoma on right arm due to potential interference with medical procedures  Assessed sciatic nerve irritation as likely cause of patient's right hip pain  Reviewed vaccination status    HYPERTENSION:  - Continued lisinopril for blood pressure management.    HISTORY OF BREAST CANCER:  - Continued tamoxifen.    LIPOMA:  - Explained lipoma as benign fatty tissue growth.  - CT of right arm lipoma ordered.  - Referred to general surgery for evaluation and potential removal of right arm lipoma.    URINARY FREQUENCY:  - Discussed bladder habits and potential irritants.  - Patient to urinate every 2 to 2.5 hours, even if not feeling the urge.  - Recommend considering avoiding foods with artificial coloring due to potential bladder irritation.    GENERAL HEALTH MANAGEMENT:  - Explained pneumonia vaccination recommendations for adults over 65.  - Patient to continue Pilates exercises.  - Recommend gradually increasing walking, using rosary recitation as motivation.  - Continued current vitamin regimen.  - Fasting labs ordered, including lipid panel.    FOLLOW-UP:  - Follow up to schedule labs and imaging after the holidays.  - Contact office to schedule  appointment for labs.      Follow up in about 1 year (around 12/2/2025), or if symptoms worsen or fail to improve, for annual exam.    This note was generated with the assistance of ambient listening technology. Verbal consent was obtained by the patient and accompanying visitor(s) for the recording of patient appointment to facilitate this note. I attest to having reviewed and edited the generated note for accuracy, though some syntax or spelling errors may persist. Please contact the author of this note for any clarification.

## 2024-12-18 ENCOUNTER — OFFICE VISIT (OUTPATIENT)
Dept: HEMATOLOGY/ONCOLOGY | Facility: CLINIC | Age: 69
End: 2024-12-18
Payer: MEDICARE

## 2024-12-18 VITALS
RESPIRATION RATE: 18 BRPM | TEMPERATURE: 97 F | OXYGEN SATURATION: 96 % | DIASTOLIC BLOOD PRESSURE: 79 MMHG | HEART RATE: 83 BPM | HEIGHT: 64 IN | SYSTOLIC BLOOD PRESSURE: 132 MMHG | BODY MASS INDEX: 26.88 KG/M2 | WEIGHT: 157.44 LBS

## 2024-12-18 DIAGNOSIS — Z79.810 LONG-TERM CURRENT USE OF TAMOXIFEN: ICD-10-CM

## 2024-12-18 DIAGNOSIS — C50.912 INVASIVE DUCTAL CARCINOMA OF LEFT BREAST IN FEMALE: Primary | ICD-10-CM

## 2024-12-18 PROCEDURE — 3008F BODY MASS INDEX DOCD: CPT | Mod: HCNC,CPTII,S$GLB, | Performed by: NURSE PRACTITIONER

## 2024-12-18 PROCEDURE — 99999 PR PBB SHADOW E&M-EST. PATIENT-LVL III: CPT | Mod: PBBFAC,HCNC,, | Performed by: NURSE PRACTITIONER

## 2024-12-18 PROCEDURE — 4010F ACE/ARB THERAPY RXD/TAKEN: CPT | Mod: HCNC,CPTII,S$GLB, | Performed by: NURSE PRACTITIONER

## 2024-12-18 PROCEDURE — 99213 OFFICE O/P EST LOW 20 MIN: CPT | Mod: HCNC,S$GLB,, | Performed by: NURSE PRACTITIONER

## 2024-12-18 PROCEDURE — 3078F DIAST BP <80 MM HG: CPT | Mod: HCNC,CPTII,S$GLB, | Performed by: NURSE PRACTITIONER

## 2024-12-18 PROCEDURE — 1101F PT FALLS ASSESS-DOCD LE1/YR: CPT | Mod: HCNC,CPTII,S$GLB, | Performed by: NURSE PRACTITIONER

## 2024-12-18 PROCEDURE — 3075F SYST BP GE 130 - 139MM HG: CPT | Mod: HCNC,CPTII,S$GLB, | Performed by: NURSE PRACTITIONER

## 2024-12-18 PROCEDURE — 3288F FALL RISK ASSESSMENT DOCD: CPT | Mod: HCNC,CPTII,S$GLB, | Performed by: NURSE PRACTITIONER

## 2024-12-18 PROCEDURE — 1126F AMNT PAIN NOTED NONE PRSNT: CPT | Mod: HCNC,CPTII,S$GLB, | Performed by: NURSE PRACTITIONER

## 2024-12-18 PROCEDURE — 1159F MED LIST DOCD IN RCRD: CPT | Mod: HCNC,CPTII,S$GLB, | Performed by: NURSE PRACTITIONER

## 2024-12-18 NOTE — PROGRESS NOTES
Name: Tegan Alarcon  MRN:  [id number]  :  1955 Age 69 y.o.  Date of Service: 2024    Reason for visit:  Tegan Alarcon is a 69 y.o. female here regarding...19 month post breast surveillance    #Invasive Ductal Carcinoma of the Left Breast   Date of Original Diagnosis: 22  Original Stage: Stage 1A pT1c pN0(sn) ER 95% [place] 80% HER2 2+/FISH negative; [id number]%  Current Sites of Disease: none  Current Goals of Therapy: curative   Current Therapy: Tamoxifen (start 23)    Oncologic History/History of Present Illness:     Has been undergoing screening mammograms. Has had benign cysts previously , drained with benign findings several years ago, more like 15 years ago. During that time, she was on Tamoxifen for prevention of recurrent cysts and took it for 5 years.     Did not notice any lump or breast mass. Abnormality was noted on screening mammogram   22 Impression:Left  Asymmetry: Left breast 10 mm asymmetry at the posterior 12 o'clock position. Assessment: 0 - Incomplete. Diagnostic Mammogram and/or Ultrasound is recommended.     Menarche at age 15 yrs old. .  Age at first live birth 32yrs old.   Did not breast feed.   LMP in . OCP-for about a year in her 20's.   Menopause at 44yrs old, had CINTHIA for prevention.  HRT-None    Family history cancer: Mother at 41yrs old, Maternal Aunt at 80yrs old, and Maternal Cousin at 40yrs old, Maternal cousin at 41yrs old had breast cancer. Genetics negative.     Other medical conditions include well controlled HTN and osteopenia.     3/8/23 b/l mastectomy and implant based reconstruction.   Oncotype 7, no adjuvant chemotherapy was recommended.     She started Tamoxifen 23, tolerating well. Denies significant hot flashes.   Had a fall recently and was referred to Piliates for balance.  Feels her balance is improving.     Has seen her ophthalmologist who has stared monitoring her every 6 months while she in on Tamoxifen.     Interval hx: 24  Ms.  Dorian presents for her approximate 19 month breast surveillance; no breast complaints; continues Pilates for strength & balance.  Compliant with Tamoxifen & calcium/vitamin D.    PHYSICAL EXAMINATION:  ECOG PERFORMANCE STATUS: 0  Physical Exam   General:  Well-appearing, nontoxic  Eyes:  Equal and round pupils, EOMI, no scleral icterus  Mouth:  No lesions, moist  Cardiovascular:  RRR, warm, well-perfused, no peripheral edema  Lungs:  CTA, unlabored on room air, no wheezing  Neurologic:  Awake, alert and oriented, participating in the exam  Psych:  Appropriate mood and affect  Skin:  Normal pallor, No rashes  Heme:  No petechiae, no purpura  Breasts: bilateral implants, no palpable abnormalities about the chest or axillae.      LABORATORY:  CBC  Lab Results   Component Value Date    WBC 6.51 11/02/2023    HGB 14.5 11/02/2023    HCT 43.3 11/02/2023    MCV 92 11/02/2023     11/02/2023         BMP  Lab Results   Component Value Date     11/02/2023    K 4.3 11/02/2023     11/02/2023    CO2 22 (L) 11/02/2023    BUN 23 11/02/2023    CREATININE 1.0 11/02/2023    CALCIUM 9.4 11/02/2023    ANIONGAP 13 11/02/2023    ESTGFRAFRICA >60.0 07/13/2021    EGFRNONAA >60.0 07/13/2021     PATHOLOGY:  RECEPTOR STUDIES   Estrogen receptor:  Positive; strong nuclear staining in 95% of tumor cells   Progesterone receptor:  Positive; strong nuclear staining in 80% of tumor   cells   Her2:  Equivocal  (Stain score = 2+; additional testing for HER2 by FISH has   been requested and will be reported separately upon completion)   Ki-67:  15%   3/8/23  1.  BREAST, LEFT, MASTECTOMY:   --INVASIVE CARCINOMA OF NO SPECIAL TYPE (DUCTAL); IRENE HISTOLOGIC    GRADE 2 OUT OF 3, WITH FOCAL           MICROPAPILLARY FEATURES (APPROXIMATELY 5%).   --INVASIVE CARCINOMA MEASURES 11 MILLIMETERS IN MAXIMUM DIMENSION AND    IS PRESENT IN 11:00 REGION.        --BIOPSY SITE CHANGES.   --RECEPTOR STUDIES REPORTED PREVIOUSLY AT OUTSIDE  FACILITY ON BIOPSY    MATERIAL (SEE COMMENT).        --NO LYMPHATIC AND/OR VASCULAR INVASION IDENTIFIED.   --PART 1 SPECIMEN POSTERIOR MARGIN IS POSITIVE FOR INVASIVE CARCINOMA;    ALL REMAINING PART 1   SPECIMEN MARGINS ARE NEGATIVE FOR INVASIVE CARCINOMA; ADDITIONAL    MARGINS SENT SEPARATELY           (SEE BELOW PARTS 2 AND 3).        --ATYPICAL LOBULAR HYPERPLASIA.        --MICROSCOPIC FIBROADENOMATOID NODULE.   --MICROCALCIFICATIONS PRESENT (PREDOMINANTLY IN NON-NEOPLASTIC TISSUE).        --pT1c/pN0.      4.  LYMPH NODE, LEFT SENTINEL, EXCISIONAL BIOPSY:        --ONE LYMPH NODE WITH NO MORPHOLOGIC EVIDENCE OF MALIGNANCY (0/1).       RADIOLOGY:        ASSESSMENT AND PLAN:  Tegan Alarcon is a 69 y.o. female with...  #Invasive Ductal Carcinoma of the Left Breast   Date of Original Diagnosis: 11/30/22  Original Stage: Stage 1A pT1c pN0(sn) ER 95% [place] 80% HER2 2+/FISH negative; [id number]%  Current Sites of Disease: none  Current Goals of Therapy: curative   Current Therapy: Tamoxifen (start 5/1/23)    She presented with early stage, node negative breast cancer, now status post bilateral mastectomy with reconstruction.    Oncotype-7 with 3% risk of recurrence at 9 years with endocrine therapy.    -on Tamoxifen and tolerating it well.     Continue active surveillance.      12/18/24:  ADAM @ 19 months; f/u in 6 months as scheduled on 06/23/25 with Dr. Stephens.    Osteopenia  On calcium and vitamin D.   12/18/24: compliant with medication & pilates    MARV Khan, FNP-C  [facility]  20 minutes of total time spent on the encounter, which includes face to face time and non-face to face time preparing to see the patient (eg, review of tests), Obtaining and/or reviewing separately obtained history, Documenting clinical information in the electronic or other health record, Independently interpreting results if documented above (not separately reported) and communicating results to  the patient/family/caregiver, or Care coordination (not separately reported).         Med Onc Chart Routing      Follow up with physician 6 months. f/u as scheduled with Dr. Stephens on 06/23/25   Follow up with JEFFY    Infusion scheduling note    Injection scheduling note    Labs    Imaging    Pharmacy appointment    Other referrals

## 2024-12-20 ENCOUNTER — LAB VISIT (OUTPATIENT)
Dept: LAB | Facility: HOSPITAL | Age: 69
End: 2024-12-20
Attending: FAMILY MEDICINE
Payer: MEDICARE

## 2024-12-20 DIAGNOSIS — Z79.899 HIGH RISK MEDICATIONS (NOT ANTICOAGULANTS) LONG-TERM USE: ICD-10-CM

## 2024-12-20 DIAGNOSIS — E78.5 HYPERLIPIDEMIA, UNSPECIFIED HYPERLIPIDEMIA TYPE: ICD-10-CM

## 2024-12-20 DIAGNOSIS — Z00.00 ROUTINE GENERAL MEDICAL EXAMINATION AT A HEALTH CARE FACILITY: ICD-10-CM

## 2024-12-20 LAB
ALBUMIN SERPL BCP-MCNC: 4 G/DL (ref 3.5–5.2)
ALP SERPL-CCNC: 43 U/L (ref 40–150)
ALT SERPL W/O P-5'-P-CCNC: 18 U/L (ref 10–44)
ANION GAP SERPL CALC-SCNC: 11 MMOL/L (ref 8–16)
AST SERPL-CCNC: 23 U/L (ref 10–40)
BILIRUB SERPL-MCNC: 0.4 MG/DL (ref 0.1–1)
BUN SERPL-MCNC: 21 MG/DL (ref 8–23)
CALCIUM SERPL-MCNC: 9.7 MG/DL (ref 8.7–10.5)
CHLORIDE SERPL-SCNC: 109 MMOL/L (ref 95–110)
CHOLEST SERPL-MCNC: 186 MG/DL (ref 120–199)
CHOLEST/HDLC SERPL: 3.9 {RATIO} (ref 2–5)
CO2 SERPL-SCNC: 20 MMOL/L (ref 23–29)
CREAT SERPL-MCNC: 0.9 MG/DL (ref 0.5–1.4)
ERYTHROCYTE [DISTWIDTH] IN BLOOD BY AUTOMATED COUNT: 12.6 % (ref 11.5–14.5)
EST. GFR  (NO RACE VARIABLE): >60 ML/MIN/1.73 M^2
ESTIMATED AVG GLUCOSE: 100 MG/DL (ref 68–131)
GLUCOSE SERPL-MCNC: 90 MG/DL (ref 70–110)
HBA1C MFR BLD: 5.1 % (ref 4–5.6)
HCT VFR BLD AUTO: 44.2 % (ref 37–48.5)
HDLC SERPL-MCNC: 48 MG/DL (ref 40–75)
HDLC SERPL: 25.8 % (ref 20–50)
HGB BLD-MCNC: 15.1 G/DL (ref 12–16)
LDLC SERPL CALC-MCNC: 78.6 MG/DL (ref 63–159)
MCH RBC QN AUTO: 31.4 PG (ref 27–31)
MCHC RBC AUTO-ENTMCNC: 34.2 G/DL (ref 32–36)
MCV RBC AUTO: 92 FL (ref 82–98)
NONHDLC SERPL-MCNC: 138 MG/DL
PLATELET # BLD AUTO: 308 K/UL (ref 150–450)
PMV BLD AUTO: 11 FL (ref 9.2–12.9)
POTASSIUM SERPL-SCNC: 4.3 MMOL/L (ref 3.5–5.1)
PROT SERPL-MCNC: 7.2 G/DL (ref 6–8.4)
RBC # BLD AUTO: 4.81 M/UL (ref 4–5.4)
SODIUM SERPL-SCNC: 140 MMOL/L (ref 136–145)
TRIGL SERPL-MCNC: 297 MG/DL (ref 30–150)
TSH SERPL DL<=0.005 MIU/L-ACNC: 2.61 UIU/ML (ref 0.4–4)
VIT B12 SERPL-MCNC: 624 PG/ML (ref 210–950)
WBC # BLD AUTO: 6.81 K/UL (ref 3.9–12.7)

## 2024-12-20 PROCEDURE — 80061 LIPID PANEL: CPT | Mod: HCNC | Performed by: FAMILY MEDICINE

## 2024-12-20 PROCEDURE — 83036 HEMOGLOBIN GLYCOSYLATED A1C: CPT | Mod: HCNC | Performed by: FAMILY MEDICINE

## 2024-12-20 PROCEDURE — 85027 COMPLETE CBC AUTOMATED: CPT | Mod: HCNC | Performed by: FAMILY MEDICINE

## 2024-12-20 PROCEDURE — 80053 COMPREHEN METABOLIC PANEL: CPT | Mod: HCNC | Performed by: FAMILY MEDICINE

## 2024-12-20 PROCEDURE — 84443 ASSAY THYROID STIM HORMONE: CPT | Mod: HCNC | Performed by: FAMILY MEDICINE

## 2024-12-20 PROCEDURE — 82607 VITAMIN B-12: CPT | Mod: HCNC | Performed by: FAMILY MEDICINE

## 2024-12-20 PROCEDURE — 36415 COLL VENOUS BLD VENIPUNCTURE: CPT | Mod: HCNC,PN | Performed by: FAMILY MEDICINE

## 2024-12-30 ENCOUNTER — HOSPITAL ENCOUNTER (OUTPATIENT)
Dept: RADIOLOGY | Facility: HOSPITAL | Age: 69
Discharge: HOME | End: 2024-12-30
Attending: FAMILY MEDICINE
Payer: MEDICARE

## 2024-12-30 DIAGNOSIS — R22.31 LOCALIZED SWELLING, MASS, OR LUMP OF RIGHT UPPER EXTREMITY: ICD-10-CM

## 2024-12-30 PROCEDURE — 25500020 PHARM REV CODE 255: Mod: HCNC,PO | Performed by: FAMILY MEDICINE

## 2024-12-30 PROCEDURE — 73202 CT UPPR EXTREMITY W/O&W/DYE: CPT | Mod: TC,HCNC,PO,RT

## 2024-12-30 PROCEDURE — 73202 CT UPPR EXTREMITY W/O&W/DYE: CPT | Mod: 26,HCNC,RT, | Performed by: RADIOLOGY

## 2024-12-30 RX ADMIN — IOHEXOL 75 ML: 350 INJECTION, SOLUTION INTRAVENOUS at 03:12

## 2025-02-11 DIAGNOSIS — I10 PRIMARY HYPERTENSION: ICD-10-CM

## 2025-02-11 NOTE — TELEPHONE ENCOUNTER
No care due was identified.  [facility] Embedded Care Due Messages. Reference number: [id number].   2/11/2025 3:23:50 AM CST

## 2025-02-11 NOTE — TELEPHONE ENCOUNTER
Refill Routing Note   Medication(s) are not appropriate for processing by [facility] for the following reason(s):        Required vitals abnormal    [facility] action(s):  Defer               Appointments  past 12m or future 3m with PCP    Date Provider   Last Visit   12/2/2024 Kari Miguel MD   Next Visit   Visit date not found Kari Miguel MD   ED visits in past 90 days: 0        Note composed:7:07 AM 02/11/2025

## 2025-02-12 RX ORDER — LISINOPRIL 10 MG/1
10 TABLET ORAL
Qty: 90 TABLET | Refills: 3 | Status: SHIPPED | OUTPATIENT
Start: 2025-02-12

## 2025-05-21 ENCOUNTER — TELEPHONE (OUTPATIENT)
Dept: FAMILY MEDICINE | Facility: CLINIC | Age: 70
End: 2025-05-21
Payer: MEDICARE

## 2025-05-21 NOTE — TELEPHONE ENCOUNTER
----- Message from Cristal sent at 5/21/2025 11:59 AM CDT -----  Type:  Needs Medical AdviceWho Called: Ivis Griffin Microbiology labSymptoms (please be specific): na How long has patient had these symptoms:  naPharmacy name and phone #:  naWould the patient rather a call back or a response via [facility]? Call back Best Call Back Number: Ivis - [phone] Information: Ivis is calling because the pts stool is hard as a rock and she is needing permission to cancel it.      Please call Back to advise. Thanks!

## 2025-05-22 NOTE — TELEPHONE ENCOUNTER
Spoke with Ivis and she stated she got the order to cancel the order per Dr. Miguel written order on yesterday.

## 2025-06-23 ENCOUNTER — OFFICE VISIT (OUTPATIENT)
Dept: HEMATOLOGY/ONCOLOGY | Facility: CLINIC | Age: 70
End: 2025-06-23
Payer: MEDICARE

## 2025-06-23 VITALS
OXYGEN SATURATION: 99 % | RESPIRATION RATE: 17 BRPM | SYSTOLIC BLOOD PRESSURE: 163 MMHG | BODY MASS INDEX: 26.24 KG/M2 | HEART RATE: 93 BPM | WEIGHT: 153.69 LBS | HEIGHT: 64 IN | DIASTOLIC BLOOD PRESSURE: 95 MMHG | TEMPERATURE: 98 F

## 2025-06-23 DIAGNOSIS — M85.80 OSTEOPENIA, UNSPECIFIED LOCATION: ICD-10-CM

## 2025-06-23 DIAGNOSIS — Z79.810 CARE RELATED TO CURRENT TAMOXIFEN USE: ICD-10-CM

## 2025-06-23 DIAGNOSIS — C50.912 INVASIVE DUCTAL CARCINOMA OF LEFT BREAST IN FEMALE: Primary | ICD-10-CM

## 2025-06-23 PROCEDURE — 1101F PT FALLS ASSESS-DOCD LE1/YR: CPT | Mod: CPTII,S$GLB,, | Performed by: INTERNAL MEDICINE

## 2025-06-23 PROCEDURE — 3008F BODY MASS INDEX DOCD: CPT | Mod: CPTII,S$GLB,, | Performed by: INTERNAL MEDICINE

## 2025-06-23 PROCEDURE — 99214 OFFICE O/P EST MOD 30 MIN: CPT | Mod: S$GLB,,, | Performed by: INTERNAL MEDICINE

## 2025-06-23 PROCEDURE — 3080F DIAST BP >= 90 MM HG: CPT | Mod: CPTII,S$GLB,, | Performed by: INTERNAL MEDICINE

## 2025-06-23 PROCEDURE — 99999 PR PBB SHADOW E&M-EST. PATIENT-LVL III: CPT | Mod: PBBFAC,,, | Performed by: INTERNAL MEDICINE

## 2025-06-23 PROCEDURE — 3077F SYST BP >= 140 MM HG: CPT | Mod: CPTII,S$GLB,, | Performed by: INTERNAL MEDICINE

## 2025-06-23 PROCEDURE — 1159F MED LIST DOCD IN RCRD: CPT | Mod: CPTII,S$GLB,, | Performed by: INTERNAL MEDICINE

## 2025-06-23 PROCEDURE — 1126F AMNT PAIN NOTED NONE PRSNT: CPT | Mod: CPTII,S$GLB,, | Performed by: INTERNAL MEDICINE

## 2025-06-23 PROCEDURE — 3288F FALL RISK ASSESSMENT DOCD: CPT | Mod: CPTII,S$GLB,, | Performed by: INTERNAL MEDICINE

## 2025-06-23 PROCEDURE — 4010F ACE/ARB THERAPY RXD/TAKEN: CPT | Mod: CPTII,S$GLB,, | Performed by: INTERNAL MEDICINE

## 2025-06-23 NOTE — PROGRESS NOTES
Name: Tegan Alarcon  MRN:  [id number]  :  1955 Age 69 y.o.  Date of Service: 2025    Reason for visit:  Tegan Alarcon is a 69 y.o. female here regarding the medical problems listed below  #Invasive Ductal Carcinoma of the Left Breast   Date of Original Diagnosis: 22  Original Stage: Stage 1A pT1c pN0(sn) ER 95% [place] 80% HER2 2+/FISH negative; [id number]%  Current Sites of Disease: none  Current Goals of Therapy: curative   Current Therapy: Tamoxifen (start 23)    Oncologic History/History of Present Illness:     Has been undergoing screening mammograms. Has had benign cysts previously , drained with benign findings several years ago, more like 15 years ago. During that time, she was on Tamoxifen for prevention of recurrent cysts and took it for 5 years.     Did not notice any lump or breast mass. Abnormality was noted on screening mammogram   22 Impression:Left  Asymmetry: Left breast 10 mm asymmetry at the posterior 12 o'clock position. Assessment: 0 - Incomplete. Diagnostic Mammogram and/or Ultrasound is recommended.     Menarche at age 15 yrs old. .  Age at first live birth 32yrs old.   Did not breast feed.   LMP in . OCP-for about a year in her 20's.   Menopause at 44yrs old, had CINTHIA for prevention.  HRT-None    Family history cancer: Mother at 41yrs old, Maternal Aunt at 80yrs old, and Maternal Cousin at 40yrs old, Maternal cousin at 41yrs old had breast cancer. Genetics negative.     Other medical conditions include well controlled HTN and osteopenia.     3/8/23 b/l mastectomy and implant based reconstruction.   Oncotype 7, no adjuvant chemotherapy was recommended.     She started Tamoxifen 23--surgical revision due to capsular contracture of the left breast; complicated by post operative GI illness    Interval hx: 2025  Patient presents after breast revision and postoperative GI illness and now doing better, reports taking a 2 week preop and postop break  from tamoxifen and has since restarted her tamoxifen, compliant.  No breast concerns as of today.  Tolerating tamoxifen without adverse sequelae      PHYSICAL EXAMINATION:  ECOG PERFORMANCE STATUS: 0  Physical Exam   General:  Well-appearing, nontoxic  Eyes:  Equal and round pupils, EOMI, no scleral icterus  Mouth:  No lesions, moist  Cardiovascular:  RRR, warm, well-perfused, no peripheral edema  Lungs:  CTA, unlabored on room air, no wheezing  Neurologic:  Awake, alert and oriented, participating in the exam  Psych:  Appropriate mood and affect  Skin:  Normal pallor, No rashes  Heme:  No petechiae, no purpura  Breasts: bilateral implants, no palpable abnormalities about the chest or axillae.      LABORATORY:  CBC  Lab Results   Component Value Date    WBC 7.92 05/13/2025    HGB 15.0 05/13/2025    HCT 43.3 05/13/2025    MCV 88 05/13/2025     05/13/2025         BMP  Lab Results   Component Value Date     05/13/2025    K 4.4 05/13/2025     05/13/2025    CO2 19 (L) 05/13/2025    BUN 15 05/13/2025    CREATININE 0.83 05/13/2025    CALCIUM 9.2 05/13/2025    ANIONGAP 13 05/13/2025    ESTGFRAFRICA >60.0 07/13/2021    EGFRNONAA >60.0 07/13/2021         ASSESSMENT AND PLAN:  Tegan Alarcon is a 69 y.o. female with...  #Invasive Ductal Carcinoma of the Left Breast   Date of Original Diagnosis: 11/30/22  Original Stage: Stage 1A pT1c pN0(sn) ER 95% [place] 80% HER2 2+/FISH negative; [id number]%  Current Sites of Disease: none  Current Goals of Therapy: curative   Current Therapy: Tamoxifen (start 5/1/23)    She presented with early stage, node negative breast cancer, now status post bilateral mastectomy with reconstruction.    Oncotype-7 with 3% risk of recurrence at 9 years with endocrine therapy.  -on Tamoxifen and tolerating it well.     Continue active surveillance.      06/23/2025:  Clinical breast exam benign, continue q.6 months surveillance, follow primary care for lab work (labs review 05/13/2025), advised  patient to stop tamoxifen for approximately 2 weeks before in approximately 2 weeks after any surgery that requires anesthesia, she is following with her ophthalmologist every 6 on tamoxifen.    Osteopenia  On calcium and vitamin D. tamoxifen has no detrimental effect on bone health referral to primary care  12/18/24: compliant with medication & pilates          Med Onc Chart Routing      Follow up with physician 1 year. In a gown nolabs   Follow up with JEFFY 6 months. In a gown no labs   Infusion scheduling note    Injection scheduling note    Labs    Imaging    Pharmacy appointment    Other referrals

## 2025-07-22 DIAGNOSIS — C50.912 INVASIVE DUCTAL CARCINOMA OF LEFT BREAST IN FEMALE: ICD-10-CM

## 2025-07-22 RX ORDER — TAMOXIFEN CITRATE 20 MG/1
TABLET ORAL
Qty: 90 TABLET | Refills: 6 | Status: SHIPPED | OUTPATIENT
Start: 2025-07-22

## 2025-09-04 ENCOUNTER — TELEPHONE (OUTPATIENT)
Dept: HEMATOLOGY/ONCOLOGY | Facility: CLINIC | Age: 70
End: 2025-09-04
Payer: MEDICARE